# Patient Record
Sex: FEMALE | Race: WHITE | NOT HISPANIC OR LATINO | Employment: FULL TIME | URBAN - METROPOLITAN AREA
[De-identification: names, ages, dates, MRNs, and addresses within clinical notes are randomized per-mention and may not be internally consistent; named-entity substitution may affect disease eponyms.]

---

## 2017-01-16 ENCOUNTER — LAB VISIT (OUTPATIENT)
Dept: LAB | Facility: HOSPITAL | Age: 37
End: 2017-01-16
Attending: INTERNAL MEDICINE
Payer: COMMERCIAL

## 2017-01-16 ENCOUNTER — OFFICE VISIT (OUTPATIENT)
Dept: INTERNAL MEDICINE | Facility: CLINIC | Age: 37
End: 2017-01-16
Payer: COMMERCIAL

## 2017-01-16 VITALS
HEIGHT: 65 IN | OXYGEN SATURATION: 99 % | HEART RATE: 98 BPM | RESPIRATION RATE: 18 BRPM | WEIGHT: 125.44 LBS | SYSTOLIC BLOOD PRESSURE: 122 MMHG | BODY MASS INDEX: 20.9 KG/M2 | DIASTOLIC BLOOD PRESSURE: 88 MMHG

## 2017-01-16 DIAGNOSIS — T14.8XXA BRUISING: ICD-10-CM

## 2017-01-16 DIAGNOSIS — R42 DIZZINESS: Primary | ICD-10-CM

## 2017-01-16 DIAGNOSIS — R42 DIZZINESS: ICD-10-CM

## 2017-01-16 LAB
ANION GAP SERPL CALC-SCNC: 8 MMOL/L
BASOPHILS # BLD AUTO: 0.02 K/UL
BASOPHILS NFR BLD: 0.5 %
BUN SERPL-MCNC: 12 MG/DL
CALCIUM SERPL-MCNC: 9.5 MG/DL
CHLORIDE SERPL-SCNC: 102 MMOL/L
CO2 SERPL-SCNC: 26 MMOL/L
CREAT SERPL-MCNC: 0.8 MG/DL
DIFFERENTIAL METHOD: ABNORMAL
EOSINOPHIL # BLD AUTO: 0 K/UL
EOSINOPHIL NFR BLD: 0.7 %
ERYTHROCYTE [DISTWIDTH] IN BLOOD BY AUTOMATED COUNT: 14.1 %
EST. GFR  (AFRICAN AMERICAN): >60 ML/MIN/1.73 M^2
EST. GFR  (NON AFRICAN AMERICAN): >60 ML/MIN/1.73 M^2
GLUCOSE SERPL-MCNC: 116 MG/DL
HCT VFR BLD AUTO: 39.3 %
HGB BLD-MCNC: 13.2 G/DL
LYMPHOCYTES # BLD AUTO: 1 K/UL
LYMPHOCYTES NFR BLD: 23.8 %
MCH RBC QN AUTO: 33.1 PG
MCHC RBC AUTO-ENTMCNC: 33.6 %
MCV RBC AUTO: 99 FL
MONOCYTES # BLD AUTO: 0.9 K/UL
MONOCYTES NFR BLD: 22.4 %
NEUTROPHILS # BLD AUTO: 2.2 K/UL
NEUTROPHILS NFR BLD: 52.4 %
PLATELET # BLD AUTO: 249 K/UL
PMV BLD AUTO: 11.1 FL
POTASSIUM SERPL-SCNC: 4.4 MMOL/L
RBC # BLD AUTO: 3.99 M/UL
SODIUM SERPL-SCNC: 136 MMOL/L
WBC # BLD AUTO: 4.11 K/UL

## 2017-01-16 PROCEDURE — 99999 PR PBB SHADOW E&M-EST. PATIENT-LVL III: CPT | Mod: PBBFAC,,, | Performed by: INTERNAL MEDICINE

## 2017-01-16 PROCEDURE — 36415 COLL VENOUS BLD VENIPUNCTURE: CPT

## 2017-01-16 PROCEDURE — 80048 BASIC METABOLIC PNL TOTAL CA: CPT

## 2017-01-16 PROCEDURE — 85025 COMPLETE CBC W/AUTO DIFF WBC: CPT

## 2017-01-16 PROCEDURE — 99214 OFFICE O/P EST MOD 30 MIN: CPT | Mod: S$GLB,,, | Performed by: INTERNAL MEDICINE

## 2017-01-16 PROCEDURE — 1159F MED LIST DOCD IN RCRD: CPT | Mod: S$GLB,,, | Performed by: INTERNAL MEDICINE

## 2017-01-16 NOTE — PROGRESS NOTES
"Georgie Wetzel presents today to urgent care for:  Rash (Rt middle finger); Dizziness (2 days ago ); Bleeding/Bruising (going on for 2 wks); Fever (along with chills last night); Chills (all the time ); and Nausea (off and on for a week )      HPI Comments: She wants to know if this is related to the spironolactone which she was recently started on because of her amenorrhea.  She also has been concerned that she has gotten pregnant the past to time she's had sex and has taken "Plan B" within 3-4 days of that sexual activity even though she was already on birth control.  She is also taking pregnancy tests which have come back negative.  She has discussed her anxiety issues with her primary care physician in the past but she does not want to take any medications for it.    Rash   This is a new problem. The current episode started in the past 7 days. The problem is unchanged. Location: on right ring finger - small bumps. no pain. +Hx of warts. The rash is characterized by dryness. She was exposed to nothing. Pertinent negatives include no diarrhea. Past treatments include nothing.   Dizziness:   Chronicity:  New  Onset:  1 to 4 weeks ago  Progression since onset:  Unchanged  Frequency - weeks/days included: intermitently.  Duration:  Very brief  Dizziness characteristics:  Lightheaded/impending faintno hearing loss, no ear congestion, no tinnitus, no visual disturbances, no syncope, no palpitations and no panic.  Aggravated by:  Position changes  Treatments tried:  Nothing      Past medical, social, family and surgical history was reviewed and updated today as needed. See encounter for details.     Review of Systems   Constitutional: Positive for weight loss (a few pounds ). Negative for chills and malaise/fatigue.        She exercises for about 2 hours daily. This has been identified as a contributing cause of her amenorrhea.    HENT: Negative for hearing loss and tinnitus.    Eyes: Negative for blurred vision and " double vision.   Cardiovascular: Negative for palpitations and syncope.   Gastrointestinal: Negative for constipation and diarrhea.   Skin: Positive for rash.   Neurological: Positive for dizziness.   Endo/Heme/Allergies: Bruises/bleeds easily.       Vitals:    01/16/17 0848   BP: 122/88   Pulse: 98   Resp: 18   Body mass index is 20.87 kg/(m^2).   Physical Exam   Constitutional: She is oriented to person, place, and time.   Thin white female who appears nervous/anxious but in no acute distress   HENT:   Head: Normocephalic and atraumatic.   Eyes: EOM are normal.   Neck: No JVD present. No thyromegaly present.   Cardiovascular: Normal rate and regular rhythm.  Exam reveals no gallop and no friction rub.    No murmur heard.  Pulmonary/Chest: Effort normal and breath sounds normal. No respiratory distress. She has no wheezes.   Abdominal: Soft. Bowel sounds are normal. She exhibits no distension and no mass. There is no tenderness.   Lymphadenopathy:     She has no cervical adenopathy.   Neurological: She is alert and oriented to person, place, and time.   Skin: Rash (on right ring finger-small bumps nonspecific in nature) noted.   Psychiatric: Her mood appears anxious. Thought content is paranoid (she admits that she is paranoid she will get pregnant after having sex). She expresses impulsivity (her impulsivity includes buying Plan B repeatedly even though she is on a normalize dose of birth control).        Assessment/plan:   1. Bruising  - CBC auto differential; Future  - Basic metabolic panel; Future    2. Dizziness  - CBC auto differential; Future  - Basic metabolic panel; Future    Labs today to help patient be reassured that there are no metabolic issues going on here.  I once again have indicated that she should discuss her anxiety issues and get these under better control since they seem to be the cause of some of her problems.  Return if symptoms worsen or fail to improve.  All risks and benefits of  medications discussed with the patient today in detail. Pt. Voiced understanding and was provided with patient educational materials regarding these medications and encouraged to read this material and call the office with any questions or concerns.

## 2017-01-16 NOTE — MR AVS SNAPSHOT
[facility] - Internal Medicine  [address]  [facility] [address]  Phone: [phone]  Fax: [phone]                  Georgie Wetzel   2017 9:00 AM   Office Visit    Description:  Female : 1980   Provider:  LEONA Lee II, MD   Department:  [facility] - Internal Medicine           Reason for Visit     Rash     Dizziness     Bleeding/Bruising     Fever     Chills     Nausea           Diagnoses this Visit        Comments    Dizziness    -  Primary     Bruising                To Do List           Future Appointments        Provider Department Dept Phone    2017 9:30 AM LAB, APPOINTMENT [facility] [phone]    2017 3:15 PM Thom Evans MD [facility] [place] [address] [phone]      Goals (5 Years of Data)     None      Follow-Up and Disposition     Return if symptoms worsen or fail to improve.    Follow-up and Disposition History      [facility]     [facility] Care Line -  Assistance  Registered nurses in the [facility] provide clinical advisement, health education, appointment booking, and other advisory services.  Call for this free service at [phone].             Medications           Message regarding Medications     Verify the changes and/or additions to your medication regime listed below are the same as discussed with your clinician today.  If any of these changes or additions are incorrect, please notify your healthcare provider.             Verify that the below list of medications is an accurate representation of the medications you are currently taking.  If none reported, the list may be blank. If incorrect, please contact your healthcare provider. Carry this list with you in case of emergency.           Current Medications     ACZONE 5 % topical gel APPLY A THIN FILM TO FACE IN THE MORNING    LACTOBACILLUS ACIDOPHILUS (ACIDOPHILUS ORAL) Take by mouth.    norethindrone-ethinyl  "estradiol (MICROGESTIN 1/20) 1-20 mg-mcg per tablet Take 1 tablet by mouth once daily.    omeprazole (PRILOSEC OTC) 20 MG tablet Take 20 mg by mouth once daily.    spironolactone (ALDACTONE) 100 MG tablet Take 100 mg by mouth once daily.    ZIANA gel APPLY A THIN FILM TO FACE AT BEDTIME           Clinical Reference Information           Vital Signs - Last Recorded  Most recent update: 1/16/2017  8:53 AM by Elis Chairez MA    BP Pulse Resp Ht Wt LMP    122/88 (BP Location: Left arm, Patient Position: Sitting, BP Method: Manual) 98 18 5' 5" (1.651 m) 56.9 kg (125 lb 7.1 oz) 01/11/2017    SpO2 BMI             99% 20.87 kg/m2         Blood Pressure          Most Recent Value    BP  122/88      Allergies as of 1/16/2017     No Known Drug Allergies      Immunizations Administered on Date of Encounter - 1/16/2017     None      Orders Placed During Today's Visit     Future Labs/Procedures Expected by Expires    Basic metabolic panel  1/16/2017 4/16/2017    CBC auto differential  1/16/2017 (Approximate) 1/11/2018      "

## 2017-01-23 ENCOUNTER — OFFICE VISIT (OUTPATIENT)
Dept: INTERNAL MEDICINE | Facility: CLINIC | Age: 37
End: 2017-01-23
Payer: COMMERCIAL

## 2017-01-23 VITALS
SYSTOLIC BLOOD PRESSURE: 130 MMHG | BODY MASS INDEX: 21.38 KG/M2 | WEIGHT: 128.31 LBS | OXYGEN SATURATION: 99 % | HEIGHT: 65 IN | HEART RATE: 76 BPM | DIASTOLIC BLOOD PRESSURE: 80 MMHG | TEMPERATURE: 98 F

## 2017-01-23 DIAGNOSIS — R50.9 FEVER, UNSPECIFIED FEVER CAUSE: ICD-10-CM

## 2017-01-23 DIAGNOSIS — R42 DIZZINESS: ICD-10-CM

## 2017-01-23 DIAGNOSIS — F41.9 ANXIETY: Primary | ICD-10-CM

## 2017-01-23 PROCEDURE — 99213 OFFICE O/P EST LOW 20 MIN: CPT | Mod: S$GLB,,, | Performed by: INTERNAL MEDICINE

## 2017-01-23 PROCEDURE — 1159F MED LIST DOCD IN RCRD: CPT | Mod: S$GLB,,, | Performed by: INTERNAL MEDICINE

## 2017-01-23 PROCEDURE — 99999 PR PBB SHADOW E&M-EST. PATIENT-LVL III: CPT | Mod: PBBFAC,,, | Performed by: INTERNAL MEDICINE

## 2017-01-23 RX ORDER — ESCITALOPRAM OXALATE 10 MG/1
10 TABLET ORAL DAILY
Qty: 30 TABLET | Refills: 3 | Status: SHIPPED | OUTPATIENT
Start: 2017-01-23 | End: 2017-06-20

## 2017-01-23 NOTE — PROGRESS NOTES
Subjective:       Patient ID: Georgie Wetzel is a 36 y.o. female.    Chief Complaint: Fever; Anxiety; and Dizziness    HPI  Pt is a 36 y.o. female with PMH including HLD, GERD, depression, here for complaint of fever, dizziness, and anxiety. Patient reports having fevers last week, on Sunday, then saw PCP, had labs, came back normal, offered reassurance. Then had another fever Thursday-Friday. Associated symptoms include cough, chills. Fevers resolved Friday/Saturday, none since then. No more coughing. No diarrhea/constipation/dysuria. For dizziness, patient reports not really a room spinning, no worse with movement/position, more like a buzzing. But not a ringing in the ears. Worse with stress. Goes away on its own. Intermittent. None present now. For the anxiety/stress, patient has several person stressors lately at work, and things outside her control. Saw counselor in the past some years ago for prior life stresses, but nothing lately. Was on a medication as well but doesn't really like meds, does not want anything addictive.  Other than the fevers, dizziness, and anxiety, patient does not have other acute complaints at this time.    Review of Systems   Constitutional: Positive for fatigue, fever and unexpected weight change (7 lb weight loss). Negative for chills.   HENT: Negative for sore throat.    Eyes: Negative for visual disturbance.   Respiratory: Negative for cough and shortness of breath.    Cardiovascular: Negative for chest pain and palpitations.   Gastrointestinal: Negative for abdominal pain, constipation, diarrhea, nausea and vomiting.   Genitourinary: Positive for frequency (increased urination. chronic). Negative for dysuria and urgency.        Increased thirst   Musculoskeletal: Negative for arthralgias.   Skin: Negative for rash.   Neurological: Positive for dizziness. Negative for light-headedness and headaches.   Psychiatric/Behavioral: Positive for sleep disturbance. The patient is  "nervous/anxious.        Past Medical History   Diagnosis Date    Acne     Depression      treated with medication last use 2010, situational     GERD (gastroesophageal reflux disease)     High cholesterol     Retinal detachment      Past Surgical History   Procedure Laterality Date    Tonsillectomy, adenoidectomy       retinal tear os  2005      Rpk  ou dr samuel    7/2008    Retinal detachment surgery      Cryotherapy       Family History   Problem Relation Age of Onset    Acne Brother     Cancer Mother     Ovarian cancer Mother     Diabetes Maternal Grandmother     Glaucoma Maternal Grandfather     Cataracts Maternal Grandfather     Stroke Paternal Grandmother     Scleroderma Paternal Grandmother     Melanoma Neg Hx      Social History     Social History    Marital status: Single     Spouse name: N/A    Number of children: N/A    Years of education: N/A     Occupational History          Social History Main Topics    Smoking status: Never Smoker    Smokeless tobacco: Never Used    Alcohol use Yes      Comment: Social use of alcohol monthly     Drug use: No    Sexual activity: Not Currently     Partners: Male     Birth control/ protection: None     Other Topics Concern    Are You Pregnant Or Think You May Be? No    Breast-Feeding No     Social History Narrative       Objective:          Visit Vitals    /80 (BP Location: Right arm, Patient Position: Sitting, BP Method: Manual)    Pulse 76    Temp 97.9 °F (36.6 °C) (Oral)    Ht 5' 5" (1.651 m)    Wt 58.2 kg (128 lb 4.9 oz)    LMP 01/11/2017    SpO2 99%    BMI 21.35 kg/m2     Physical Exam   Constitutional: She is oriented to person, place, and time. She appears well-developed and well-nourished. No distress.   HENT:   Head: Normocephalic and atraumatic.   Eyes: EOM are normal. Pupils are equal, round, and reactive to light.   Neck: Normal range of motion. Neck supple. No thyromegaly present.   Cardiovascular: Normal " rate and regular rhythm.  Exam reveals no gallop and no friction rub.    No murmur heard.  Pulmonary/Chest: Effort normal and breath sounds normal. No respiratory distress. She has no wheezes. She has no rales.   Abdominal: Soft. Bowel sounds are normal. She exhibits no distension and no mass. There is no tenderness. There is no rebound and no guarding.   Musculoskeletal: Normal range of motion. She exhibits no edema or tenderness.   Lymphadenopathy:     She has no cervical adenopathy.   Neurological: She is alert and oriented to person, place, and time. No cranial nerve deficit.   Skin: Skin is warm and dry.   Psychiatric: She has a normal mood and affect. Her behavior is normal.   Anxious   Vitals reviewed.      Assessment:       1. Anxiety    2. Fever, unspecified fever cause    3. Dizziness        Plan:       1. Anxiety: Chronic, but worse lately 2/2 situation with work. Patient was seeing a counselor back in [phone] or so. Recommended patient contact that person again to get back into that, which could help with her symptoms and also in the long term. Will also prescribe lexapro.  2. Fever: Resolved. Likely 2/2 viral URI, or other viral illness. No longer acute issue. Offered re-assurance.  3. Dizziness: Cranial nerves intact, strength/sensation normal. Not worse with movement or anything. Likely 2/2 anxiety.  Patient can follow-up with PCP, or here, as needed.       Patient seen and discussed with Dr. Nino Ferrell MD  Internal Medicine PGY-3  Pager [phone]

## 2017-01-27 NOTE — PROGRESS NOTES
I have personally taken the history and examined this patient and agree with the resident's note as stated above with the following thoughts:    Will start lexapro.  Discussed stress

## 2017-03-13 RX ORDER — NORETHINDRONE ACETATE AND ETHINYL ESTRADIOL .02; 1 MG/1; MG/1
TABLET ORAL
Qty: 21 TABLET | Refills: 2 | Status: SHIPPED | OUTPATIENT
Start: 2017-03-13 | End: 2017-06-13 | Stop reason: SDUPTHER

## 2017-03-31 ENCOUNTER — OFFICE VISIT (OUTPATIENT)
Dept: OBSTETRICS AND GYNECOLOGY | Facility: CLINIC | Age: 37
End: 2017-03-31
Attending: OBSTETRICS & GYNECOLOGY
Payer: COMMERCIAL

## 2017-03-31 VITALS
SYSTOLIC BLOOD PRESSURE: 128 MMHG | WEIGHT: 132.5 LBS | HEIGHT: 65 IN | DIASTOLIC BLOOD PRESSURE: 72 MMHG | BODY MASS INDEX: 22.08 KG/M2

## 2017-03-31 DIAGNOSIS — Z30.41 ENCOUNTER FOR SURVEILLANCE OF CONTRACEPTIVE PILLS: Primary | ICD-10-CM

## 2017-03-31 DIAGNOSIS — N76.0 ACUTE VAGINITIS: ICD-10-CM

## 2017-03-31 LAB
C TRACH DNA SPEC QL NAA+PROBE: NOT DETECTED
CANDIDA RRNA VAG QL PROBE: POSITIVE
G VAGINALIS RRNA GENITAL QL PROBE: NEGATIVE
N GONORRHOEA DNA SPEC QL NAA+PROBE: NOT DETECTED
T VAGINALIS RRNA GENITAL QL PROBE: NEGATIVE

## 2017-03-31 PROCEDURE — 1160F RVW MEDS BY RX/DR IN RCRD: CPT | Mod: S$GLB,,, | Performed by: OBSTETRICS & GYNECOLOGY

## 2017-03-31 PROCEDURE — 87591 N.GONORRHOEAE DNA AMP PROB: CPT

## 2017-03-31 PROCEDURE — 99999 PR PBB SHADOW E&M-EST. PATIENT-LVL II: CPT | Mod: PBBFAC,,, | Performed by: OBSTETRICS & GYNECOLOGY

## 2017-03-31 PROCEDURE — 87480 CANDIDA DNA DIR PROBE: CPT

## 2017-03-31 PROCEDURE — 99213 OFFICE O/P EST LOW 20 MIN: CPT | Mod: S$GLB,,, | Performed by: OBSTETRICS & GYNECOLOGY

## 2017-03-31 RX ORDER — SPIRONOLACTONE 50 MG/1
100 TABLET, FILM COATED ORAL DAILY
Refills: 3 | COMMUNITY
Start: 2017-02-23 | End: 2018-02-28 | Stop reason: SDUPTHER

## 2017-03-31 NOTE — PROGRESS NOTES
Chief Complaint   Patient presents with    Contraception       HPI:  Georgie Wetzel is a 36 y.o. female patient  who presents today to discuss her usage of OCPs.  She reports returning to Microgestin OCPs 2016.  On this pill, she is having monthly menses lasting several days in duration with light flow.  No intermenstrual bleeding.  Previously, she had experienced an ocular migraine while taking high doses of over-the-counter estrogen containing products.  She has not experienced any similar symptoms while taking this birth control pill.  She feels that she is doing better on this pill than with other higher dose pills in the past.  However, she does experience some menstrual mood swings as well as episodes of increased vaginal discharge.  The discharge does not have an odor or itch.  Patient's last menstrual period was 2017 (approximate).    Past Medical History:   Diagnosis Date    Acne     Depression     treated with medication last use , situational     GERD (gastroesophageal reflux disease)     High cholesterol     Retinal detachment        Past Surgical History:   Procedure Laterality Date     Retinal Tear OS        CRYOTHERAPY      RETINAL DETACHMENT SURGERY      RPK  OU Dr Munoz    2008    TONSILLECTOMY, ADENOIDECTOMY           ROS:  GENERAL: Feeling well overall.   SKIN: Denies rash or lesions.   HEAD: Denies head injury or headache.   NODES: Denies enlarged lymph nodes.   CHEST: Denies chest pain or shortness of breath.   CARDIOVASCULAR: Denies palpitations or left sided chest pain.   ABDOMEN: No abdominal pain, nausea, vomiting or rectal bleeding.   URINARY: No dysuria or hematuria.  REPRODUCTIVE: See HPI.   BREASTS: Denies pain, lumps, or nipple discharge.   HEMATOLOGIC: No easy bruisability or excessive bleeding.   MUSCULOSKELETAL: Denies joint pain or swelling.   NEUROLOGIC: Denies syncope or weakness.   PSYCHIATRIC: Reports some menstrual mood swings.    PE:    (chaperone present during entire exam)  APPEARANCE: Well nourished, well developed, in no acute distress.  ABDOMEN: Soft. No tenderness or masses.   VULVA: No lesions. Normal female genitalia.  URETHRAL MEATUS: Normal size and location, no lesions, no prolapse.  VAGINA: Moist and well rugated, mild amount of thin discharge.  CERVIX: No lesions and discharge.       Diagnosis:  1. Encounter for surveillance of contraceptive pills    2. Acute vaginitis          PLAN:    Orders Placed This Encounter    Vaginosis Screen by DNA Probe    C. trachomatis/N. gonorrhoeae by AMP DNA Cervix       Patient was counseled today on her usage of OCPs.  She feels that, in general, she is doing well with Microgestin and desires to continue.  We discussed her increased discharge: physiologic vs vaginitis.  We will contact her in several days for results of the Affirm, GC/CT.        Follow-up for annual exam    Total time of visit: 20 minutes (counseling >75% of time)

## 2017-04-02 ENCOUNTER — PATIENT MESSAGE (OUTPATIENT)
Dept: OBSTETRICS AND GYNECOLOGY | Facility: CLINIC | Age: 37
End: 2017-04-02

## 2017-04-02 RX ORDER — FLUCONAZOLE 150 MG/1
150 TABLET ORAL ONCE
Qty: 1 TABLET | Refills: 0 | Status: SHIPPED | OUTPATIENT
Start: 2017-04-02 | End: 2017-04-02

## 2017-04-05 ENCOUNTER — TELEPHONE (OUTPATIENT)
Dept: OBSTETRICS AND GYNECOLOGY | Facility: CLINIC | Age: 37
End: 2017-04-05

## 2017-04-05 NOTE — TELEPHONE ENCOUNTER
From   Thom Evans MD    To   Georgie Wetzel    Sent   4/2/2017  9:30 AM         Your recent vaginal culture returned showing yeast.   I will send a prescription for Diflucan to your pharmacy on record.   This is a one time treatment for yeast.   Let us know if your symptoms do not resolve with this treatment.     -Dr. Evans

## 2017-06-13 ENCOUNTER — OFFICE VISIT (OUTPATIENT)
Dept: INTERNAL MEDICINE | Facility: CLINIC | Age: 37
End: 2017-06-13
Payer: COMMERCIAL

## 2017-06-13 ENCOUNTER — HOSPITAL ENCOUNTER (OUTPATIENT)
Dept: RADIOLOGY | Facility: HOSPITAL | Age: 37
Discharge: HOME | End: 2017-06-13
Attending: NURSE PRACTITIONER
Payer: COMMERCIAL

## 2017-06-13 VITALS
WEIGHT: 132.31 LBS | DIASTOLIC BLOOD PRESSURE: 68 MMHG | SYSTOLIC BLOOD PRESSURE: 120 MMHG | TEMPERATURE: 98 F | HEIGHT: 65 IN | HEART RATE: 78 BPM | BODY MASS INDEX: 22.04 KG/M2

## 2017-06-13 DIAGNOSIS — R10.13 EPIGASTRIC PAIN: ICD-10-CM

## 2017-06-13 DIAGNOSIS — K59.04 FUNCTIONAL CONSTIPATION: Primary | ICD-10-CM

## 2017-06-13 LAB
B-HCG UR QL: NEGATIVE
CTP QC/QA: YES

## 2017-06-13 PROCEDURE — 81025 URINE PREGNANCY TEST: CPT | Mod: QW,S$GLB,, | Performed by: NURSE PRACTITIONER

## 2017-06-13 PROCEDURE — 74020 XR ABDOMEN FLAT AND ERECT: CPT | Mod: TC

## 2017-06-13 PROCEDURE — 99999 PR PBB SHADOW E&M-EST. PATIENT-LVL IV: CPT | Mod: PBBFAC,,, | Performed by: NURSE PRACTITIONER

## 2017-06-13 PROCEDURE — 74020 XR ABDOMEN FLAT AND ERECT: CPT | Mod: 26,,, | Performed by: RADIOLOGY

## 2017-06-13 PROCEDURE — 99213 OFFICE O/P EST LOW 20 MIN: CPT | Mod: S$GLB,,, | Performed by: NURSE PRACTITIONER

## 2017-06-13 RX ORDER — NORETHINDRONE ACETATE AND ETHINYL ESTRADIOL .02; 1 MG/1; MG/1
TABLET ORAL
Qty: 21 TABLET | Refills: 6 | Status: SHIPPED | OUTPATIENT
Start: 2017-06-13 | End: 2017-11-21 | Stop reason: SDUPTHER

## 2017-06-14 ENCOUNTER — NURSE TRIAGE (OUTPATIENT)
Dept: ADMINISTRATIVE | Facility: CLINIC | Age: 37
End: 2017-06-14

## 2017-06-14 ENCOUNTER — HOSPITAL ENCOUNTER (EMERGENCY)
Facility: OTHER | Age: 37
Discharge: HOME | End: 2017-06-14
Attending: EMERGENCY MEDICINE
Payer: COMMERCIAL

## 2017-06-14 ENCOUNTER — HOSPITAL ENCOUNTER (EMERGENCY)
Facility: OTHER | Age: 37
Discharge: HOME | End: 2017-06-15
Attending: EMERGENCY MEDICINE
Payer: COMMERCIAL

## 2017-06-14 VITALS
WEIGHT: 130 LBS | BODY MASS INDEX: 21.66 KG/M2 | SYSTOLIC BLOOD PRESSURE: 120 MMHG | RESPIRATION RATE: 16 BRPM | TEMPERATURE: 99 F | DIASTOLIC BLOOD PRESSURE: 70 MMHG | HEIGHT: 65 IN | HEART RATE: 74 BPM | OXYGEN SATURATION: 99 %

## 2017-06-14 DIAGNOSIS — K76.9 LESION OF LIVER: ICD-10-CM

## 2017-06-14 DIAGNOSIS — R10.31 ABDOMINAL PAIN, RLQ: Primary | ICD-10-CM

## 2017-06-14 DIAGNOSIS — R10.31 RIGHT LOWER QUADRANT ABDOMINAL PAIN: Primary | ICD-10-CM

## 2017-06-14 DIAGNOSIS — N83.201 CYST OF RIGHT OVARY: ICD-10-CM

## 2017-06-14 DIAGNOSIS — K52.9: ICD-10-CM

## 2017-06-14 LAB
ALBUMIN SERPL BCP-MCNC: 3.8 G/DL
ALP SERPL-CCNC: 46 U/L
ALT SERPL W/O P-5'-P-CCNC: 12 U/L
ANION GAP SERPL CALC-SCNC: 9 MMOL/L
APTT BLDCRRT: 27.4 SEC
AST SERPL-CCNC: 13 U/L
BACTERIA #/AREA URNS HPF: ABNORMAL /HPF
BASOPHILS # BLD AUTO: 0.01 K/UL
BASOPHILS # BLD AUTO: 0.01 K/UL
BASOPHILS NFR BLD: 0.1 %
BASOPHILS NFR BLD: 0.1 %
BILIRUB SERPL-MCNC: 0.5 MG/DL
BILIRUB UR QL STRIP: NEGATIVE
BILIRUB UR QL STRIP: NEGATIVE
BUN SERPL-MCNC: 8 MG/DL
CALCIUM SERPL-MCNC: 9 MG/DL
CHLORIDE SERPL-SCNC: 107 MMOL/L
CLARITY UR: CLEAR
CLARITY UR: CLEAR
CO2 SERPL-SCNC: 22 MMOL/L
COLOR UR: YELLOW
COLOR UR: YELLOW
CREAT SERPL-MCNC: 0.7 MG/DL
CRP SERPL-MCNC: 11.5 MG/L
CRP SERPL-MCNC: 72.6 MG/L
DIFFERENTIAL METHOD: ABNORMAL
DIFFERENTIAL METHOD: ABNORMAL
EOSINOPHIL # BLD AUTO: 0.1 K/UL
EOSINOPHIL # BLD AUTO: 0.1 K/UL
EOSINOPHIL NFR BLD: 0.6 %
EOSINOPHIL NFR BLD: 0.7 %
ERYTHROCYTE [DISTWIDTH] IN BLOOD BY AUTOMATED COUNT: 13.2 %
ERYTHROCYTE [DISTWIDTH] IN BLOOD BY AUTOMATED COUNT: 13.4 %
EST. GFR  (AFRICAN AMERICAN): >60 ML/MIN/1.73 M^2
EST. GFR  (NON AFRICAN AMERICAN): >60 ML/MIN/1.73 M^2
GLUCOSE SERPL-MCNC: 107 MG/DL
GLUCOSE UR QL STRIP: NEGATIVE
GLUCOSE UR QL STRIP: NEGATIVE
HCT VFR BLD AUTO: 34.9 %
HCT VFR BLD AUTO: 36.4 %
HGB BLD-MCNC: 11.5 G/DL
HGB BLD-MCNC: 12.2 G/DL
HGB UR QL STRIP: ABNORMAL
HGB UR QL STRIP: NEGATIVE
INR PPP: 0.9
KETONES UR QL STRIP: NEGATIVE
KETONES UR QL STRIP: NEGATIVE
LEUKOCYTE ESTERASE UR QL STRIP: ABNORMAL
LEUKOCYTE ESTERASE UR QL STRIP: NEGATIVE
LIPASE SERPL-CCNC: 11 U/L
LYMPHOCYTES # BLD AUTO: 1.6 K/UL
LYMPHOCYTES # BLD AUTO: 1.8 K/UL
LYMPHOCYTES NFR BLD: 13.2 %
LYMPHOCYTES NFR BLD: 16.8 %
MCH RBC QN AUTO: 31.9 PG
MCH RBC QN AUTO: 32 PG
MCHC RBC AUTO-ENTMCNC: 33 %
MCHC RBC AUTO-ENTMCNC: 33.5 %
MCV RBC AUTO: 96 FL
MCV RBC AUTO: 97 FL
MICROSCOPIC COMMENT: ABNORMAL
MONOCYTES # BLD AUTO: 0.7 K/UL
MONOCYTES # BLD AUTO: 1.2 K/UL
MONOCYTES NFR BLD: 6.1 %
MONOCYTES NFR BLD: 9.9 %
NEUTROPHILS # BLD AUTO: 8.1 K/UL
NEUTROPHILS # BLD AUTO: 9.3 K/UL
NEUTROPHILS NFR BLD: 76 %
NEUTROPHILS NFR BLD: 76.1 %
NITRITE UR QL STRIP: NEGATIVE
NITRITE UR QL STRIP: NEGATIVE
NON-SQ EPI CELLS #/AREA URNS HPF: 2 /HPF
PH UR STRIP: 6 [PH] (ref 5–8)
PH UR STRIP: 6 [PH] (ref 5–8)
PLATELET # BLD AUTO: 233 K/UL
PLATELET # BLD AUTO: 239 K/UL
PMV BLD AUTO: 10 FL
PMV BLD AUTO: 10 FL
POTASSIUM SERPL-SCNC: 3.9 MMOL/L
PROT SERPL-MCNC: 7 G/DL
PROT UR QL STRIP: NEGATIVE
PROT UR QL STRIP: NEGATIVE
PROTHROMBIN TIME: 9.9 SEC
RBC # BLD AUTO: 3.6 M/UL
RBC # BLD AUTO: 3.81 M/UL
RBC #/AREA URNS HPF: 2 /HPF (ref 0–4)
SODIUM SERPL-SCNC: 138 MMOL/L
SP GR UR STRIP: <=1.005 (ref 1–1.03)
SP GR UR STRIP: <=1.005 (ref 1–1.03)
SQUAMOUS #/AREA URNS HPF: 5 /HPF
URN SPEC COLLECT METH UR: ABNORMAL
URN SPEC COLLECT METH UR: ABNORMAL
UROBILINOGEN UR STRIP-ACNC: NEGATIVE EU/DL
UROBILINOGEN UR STRIP-ACNC: NEGATIVE EU/DL
WBC # BLD AUTO: 10.69 K/UL
WBC # BLD AUTO: 12.27 K/UL
WBC #/AREA URNS HPF: 12 /HPF (ref 0–5)
WBC CLUMPS URNS QL MICRO: ABNORMAL
YEAST URNS QL MICRO: ABNORMAL

## 2017-06-14 PROCEDURE — 96374 THER/PROPH/DIAG INJ IV PUSH: CPT

## 2017-06-14 PROCEDURE — 85730 THROMBOPLASTIN TIME PARTIAL: CPT

## 2017-06-14 PROCEDURE — 80053 COMPREHEN METABOLIC PANEL: CPT

## 2017-06-14 PROCEDURE — 86140 C-REACTIVE PROTEIN: CPT | Mod: 91

## 2017-06-14 PROCEDURE — 25000003 PHARM REV CODE 250: Performed by: EMERGENCY MEDICINE

## 2017-06-14 PROCEDURE — 63600175 PHARM REV CODE 636 W HCPCS: Performed by: EMERGENCY MEDICINE

## 2017-06-14 PROCEDURE — 99283 EMERGENCY DEPT VISIT LOW MDM: CPT | Mod: 25,27

## 2017-06-14 PROCEDURE — 85025 COMPLETE CBC W/AUTO DIFF WBC: CPT | Mod: 91

## 2017-06-14 PROCEDURE — 85610 PROTHROMBIN TIME: CPT

## 2017-06-14 PROCEDURE — 87086 URINE CULTURE/COLONY COUNT: CPT

## 2017-06-14 PROCEDURE — 81000 URINALYSIS NONAUTO W/SCOPE: CPT

## 2017-06-14 PROCEDURE — 83690 ASSAY OF LIPASE: CPT

## 2017-06-14 PROCEDURE — 96376 TX/PRO/DX INJ SAME DRUG ADON: CPT

## 2017-06-14 PROCEDURE — 81003 URINALYSIS AUTO W/O SCOPE: CPT

## 2017-06-14 PROCEDURE — 85025 COMPLETE CBC W/AUTO DIFF WBC: CPT

## 2017-06-14 PROCEDURE — 96375 TX/PRO/DX INJ NEW DRUG ADDON: CPT

## 2017-06-14 PROCEDURE — 96361 HYDRATE IV INFUSION ADD-ON: CPT

## 2017-06-14 PROCEDURE — 25500020 PHARM REV CODE 255: Performed by: EMERGENCY MEDICINE

## 2017-06-14 PROCEDURE — 86140 C-REACTIVE PROTEIN: CPT

## 2017-06-14 PROCEDURE — 99284 EMERGENCY DEPT VISIT MOD MDM: CPT | Mod: 25

## 2017-06-14 RX ORDER — HYOSCYAMINE SULFATE 0.12 MG/1
0.12 TABLET SUBLINGUAL
Status: COMPLETED | OUTPATIENT
Start: 2017-06-14 | End: 2017-06-14

## 2017-06-14 RX ORDER — MORPHINE SULFATE 2 MG/ML
4 INJECTION, SOLUTION INTRAMUSCULAR; INTRAVENOUS
Status: COMPLETED | OUTPATIENT
Start: 2017-06-14 | End: 2017-06-14

## 2017-06-14 RX ORDER — HYOSCYAMINE SULFATE 0.12 MG/1
0.25 TABLET SUBLINGUAL
Status: COMPLETED | OUTPATIENT
Start: 2017-06-14 | End: 2017-06-14

## 2017-06-14 RX ORDER — ONDANSETRON 2 MG/ML
8 INJECTION INTRAMUSCULAR; INTRAVENOUS
Status: COMPLETED | OUTPATIENT
Start: 2017-06-14 | End: 2017-06-14

## 2017-06-14 RX ORDER — KETOROLAC TROMETHAMINE 30 MG/ML
30 INJECTION, SOLUTION INTRAMUSCULAR; INTRAVENOUS
Status: COMPLETED | OUTPATIENT
Start: 2017-06-14 | End: 2017-06-14

## 2017-06-14 RX ORDER — KETOROLAC TROMETHAMINE 30 MG/ML
15 INJECTION, SOLUTION INTRAMUSCULAR; INTRAVENOUS
Status: COMPLETED | OUTPATIENT
Start: 2017-06-14 | End: 2017-06-14

## 2017-06-14 RX ORDER — HYDROCODONE BITARTRATE AND ACETAMINOPHEN 5; 325 MG/1; MG/1
1 TABLET ORAL EVERY 4 HOURS PRN
Qty: 20 TABLET | Refills: 0 | Status: ON HOLD | OUTPATIENT
Start: 2017-06-14 | End: 2017-08-25

## 2017-06-14 RX ORDER — HYDROCODONE BITARTRATE AND ACETAMINOPHEN 5; 325 MG/1; MG/1
2 TABLET ORAL
Status: COMPLETED | OUTPATIENT
Start: 2017-06-14 | End: 2017-06-14

## 2017-06-14 RX ORDER — HYOSCYAMINE SULFATE 0.5 MG/ML
0.25 INJECTION, SOLUTION SUBCUTANEOUS
Status: DISCONTINUED | OUTPATIENT
Start: 2017-06-14 | End: 2017-06-14

## 2017-06-14 RX ORDER — DICYCLOMINE HYDROCHLORIDE 20 MG/1
20 TABLET ORAL 2 TIMES DAILY
Qty: 10 TABLET | Refills: 0 | Status: SHIPPED | OUTPATIENT
Start: 2017-06-14 | End: 2017-06-19

## 2017-06-14 RX ORDER — SODIUM CHLORIDE 9 MG/ML
1000 INJECTION, SOLUTION INTRAVENOUS
Status: COMPLETED | OUTPATIENT
Start: 2017-06-14 | End: 2017-06-15

## 2017-06-14 RX ORDER — ONDANSETRON 2 MG/ML
8 INJECTION INTRAMUSCULAR; INTRAVENOUS ONCE
Status: COMPLETED | OUTPATIENT
Start: 2017-06-14 | End: 2017-06-14

## 2017-06-14 RX ADMIN — ONDANSETRON 8 MG: 2 INJECTION INTRAMUSCULAR; INTRAVENOUS at 07:06

## 2017-06-14 RX ADMIN — SODIUM CHLORIDE 1000 ML: 0.9 INJECTION, SOLUTION INTRAVENOUS at 11:06

## 2017-06-14 RX ADMIN — HYOSCYAMINE SULFATE 0.12 MG: 0.12 TABLET ORAL at 11:06

## 2017-06-14 RX ADMIN — ONDANSETRON 8 MG: 2 INJECTION INTRAMUSCULAR; INTRAVENOUS at 12:06

## 2017-06-14 RX ADMIN — KETOROLAC TROMETHAMINE 30 MG: 30 INJECTION, SOLUTION INTRAMUSCULAR at 11:06

## 2017-06-14 RX ADMIN — MORPHINE SULFATE 4 MG: 2 INJECTION, SOLUTION INTRAMUSCULAR; INTRAVENOUS at 08:06

## 2017-06-14 RX ADMIN — HYOSCYAMINE SULFATE 0.25 MG: 0.12 TABLET ORAL at 08:06

## 2017-06-14 RX ADMIN — SODIUM CHLORIDE 1000 ML: 0.9 INJECTION, SOLUTION INTRAVENOUS at 07:06

## 2017-06-14 RX ADMIN — KETOROLAC TROMETHAMINE 15 MG: 30 INJECTION, SOLUTION INTRAMUSCULAR at 10:06

## 2017-06-14 RX ADMIN — HYDROCODONE BITARTRATE AND ACETAMINOPHEN 2 TABLET: 5; 325 TABLET ORAL at 11:06

## 2017-06-14 RX ADMIN — IOHEXOL 75 ML: 350 INJECTION, SOLUTION INTRAVENOUS at 09:06

## 2017-06-14 NOTE — TELEPHONE ENCOUNTER
"Was seen on yesterday for constipation and was given miralx to take. Started at 845 and advised to stop when clear. It is clear now but has a gas pain on right side.Is alleviated at times but gets sensitive to touch at times.    Reason for Disposition   [1] MILD-MODERATE pain AND [2] constant AND [3] present > 2 hours    Answer Assessment - Initial Assessment Questions  1. LOCATION: "Where does it hurt?"       Upper right quad  2. RADIATION: "Does the pain shoot anywhere else?" (e.g., chest, back)      denies  3. ONSET: "When did the pain begin?" (e.g., minutes, hours or days ago)       3 hours ago  4. SUDDEN: "Gradual or sudden onset?"      gradual  5. PATTERN "Does the pain come and go, or is it constant?"     - If constant: "Is it getting better, staying the same, or worsening?"       (Note: Constant means the pain never goes away completely; most serious pain is constant and it progresses)      - If intermittent: "How long does it last?" "Do you have pain now?"      (Note: Intermittent means the pain goes away completely between bouts)      Constant but less after going to bathroom  6. SEVERITY: "How bad is the pain?"  (e.g., Scale 1-10; mild, moderate, or severe)     - MILD (1-3): doesn't interfere with normal activities, abdomen soft and not tender to touch      - MODERATE (4-7): interferes with normal activities or awakens from sleep, tender to touch      - SEVERE (8-10): excruciating pain, doubled over, unable to do any normal activities        7-8  7. RECURRENT SYMPTOM: "Have you ever had this type of abdominal pain before?" If so, ask: "When was the last time?" and "What happened that time?"       denies  8. AGGRAVATING FACTORS: "Does anything seem to cause this pain?" (e.g., foods, stress, alcohol)      BM makes it better  9. CARDIAC SYMPTOMS: "Do you have any of the following symptoms: chest pain, difficulty breathing, sweating, nausea?"      denies  10. OTHER SYMPTOMS: "Do you have any other symptoms?" " "(e.g., fever, vomiting, diarrhea)        denies  11. PREGNANCY: "Is there any chance you are pregnant?" "When was your last menstrual period?"        denies    Protocols used: ST ABDOMINAL PAIN - UPPER-A-AH      "

## 2017-06-14 NOTE — ED PROVIDER NOTES
"Encounter Date: 6/14/2017    SCRIBE #1 NOTE: I, Ena Mancia, am scribing for, and in the presence of,  Dr. Peralta. I have scribed the entire note.       History     Chief Complaint   Patient presents with    Abdominal Pain     was constipated and was told to do a mirilax cleanse and take mirilax every 20 min until tae comes out clear, started last night 8:30 till 1:15 18 doses and is now having severe abdominal pain     Review of patient's allergies indicates:   Allergen Reactions    No known drug allergies      Time seen by provider: 7:02 AM    This is a 36 y.o. female with GERD who presents with complaint of RLQ abdominal pain that worsened 4 hours PTA. She describes the pain as "cramping and severe gas." She notes associated mild intermittent nausea. She reports pain is worse with movement. She was seen yesterday by internal medicine for epigastric abdominal pain and nausea. She was diagnosed with constipation. She took 18 doses of Miralax between 20:30 last night and 1:30 this morning. She reports she has had numerous BMs that have been "running clear." Last BM was 1 hour PTA. She denies fever, hematuria, or dysuria. LMP 2 weeks ago. She denies history of ovarian cysts. She denies h/o abdominal surgeries. NKDA. No additional complaints.     Additional past medical, surgical, and social history as outlined in the nursing assessment was reviewed by me.        The history is provided by the patient.     Past Medical History:   Diagnosis Date    Acne     Depression     treated with medication last use 2010, situational     GERD (gastroesophageal reflux disease)     High cholesterol     Retinal detachment      Past Surgical History:   Procedure Laterality Date     Retinal Tear OS  2005      CRYOTHERAPY      RETINAL DETACHMENT SURGERY      RPK  [facility] Dr Munoz    7/2008    TONSILLECTOMY, ADENOIDECTOMY       Family History   Problem Relation Age of Onset    Acne Brother     Cancer Mother     Ovarian " cancer Mother     Diabetes Maternal Grandmother     Glaucoma Maternal Grandfather     Cataracts Maternal Grandfather     Stroke Paternal Grandmother     Scleroderma Paternal Grandmother     Melanoma Neg Hx      Social History   Substance Use Topics    Smoking status: Never Smoker    Smokeless tobacco: Never Used    Alcohol use Yes      Comment: Social use of alcohol monthly      Review of Systems   Constitutional: Negative for chills and fever.   HENT: Negative for congestion.    Respiratory: Negative for cough and shortness of breath.    Cardiovascular: Negative for chest pain.   Gastrointestinal: Positive for abdominal pain, constipation (resolved) and nausea. Negative for vomiting.        Numerous BM.    Genitourinary: Negative for dysuria and hematuria.   Musculoskeletal: Negative for myalgias.   Skin: Negative for color change and rash.   Neurological: Negative for dizziness and light-headedness.   Psychiatric/Behavioral: Negative for behavioral problems.       Physical Exam     Initial Vitals [06/14/17 0650]   BP Pulse Resp Temp SpO2   (!) 132/91 88 16 98.2 °F (36.8 °C) 98 %     Physical Exam    Constitutional: She appears well-developed and well-nourished. She is not diaphoretic. No distress.   HENT:   Head: Normocephalic and atraumatic.   Eyes: Conjunctivae are normal. Pupils are equal, round, and reactive to light.   Neck: Normal range of motion. Neck supple.   Cardiovascular: Normal rate, regular rhythm and normal heart sounds. Exam reveals no gallop and no friction rub.    No murmur heard.  Pulmonary/Chest: Breath sounds normal. No respiratory distress. She has no wheezes. She has no rhonchi. She has no rales.   Abdominal: Soft. She exhibits no distension. Bowel sounds are decreased. There is tenderness. There is no rebound and no guarding.   RLQ tenderness, not at McBurney's point. Negative obturator sign. Negative psoas sign.   Musculoskeletal: Normal range of motion. She exhibits no edema or  tenderness.   Neurological: She is alert and oriented to person, place, and time. She has normal strength. No cranial nerve deficit or sensory deficit.   Skin: Skin is warm and dry. No erythema. No pallor.   Psychiatric: She has a normal mood and affect.         ED Course   Procedures  Labs Reviewed   URINALYSIS - Abnormal; Notable for the following:        Result Value    Specific Gravity, UA <=1.005 (*)     Occult Blood UA Trace (*)     Leukocytes, UA 1+ (*)     All other components within normal limits   C-REACTIVE PROTEIN - Abnormal; Notable for the following:     CRP 11.5 (*)     All other components within normal limits   CBC W/ AUTO DIFFERENTIAL - Abnormal; Notable for the following:     RBC 3.81 (*)     Hematocrit 36.4 (*)     MCH 32.0 (*)     Gran # 8.1 (*)     Gran% 76.1 (*)     Lymph% 16.8 (*)     All other components within normal limits   COMPREHENSIVE METABOLIC PANEL - Abnormal; Notable for the following:     CO2 22 (*)     Alkaline Phosphatase 46 (*)     All other components within normal limits   URINALYSIS MICROSCOPIC - Abnormal; Notable for the following:     WBC, UA 12 (*)     WBC Clumps, UA Occasional (*)     Bacteria, UA Few (*)     Yeast, UA Rare (*)     Non-Squam Epith 2 (*)     All other components within normal limits   CULTURE, URINE   CULTURE, URINE   LIPASE   PROTIME-INR   APTT     Imaging Results          US Pelvis Comp with Transvag NON-OB (xpd (Final result)  Result time 06/14/17 13:10:14    Final result by Clay Gonzalez MD (06/14/17 13:10:14)                 Impression:        3.1 cm benign simple cysts of the right ovary.      Electronically signed by: CLAY GONZALEZ MD  Date:     06/14/17  Time:    13:10              Narrative:    Comparison: None.      Technique: Transabdominal and transvaginal sonographic evaluation of pelvic organs was performed including grayscale, color flow and spectral technique.    Findings:    The uterus is normal in size measuring 6.8 x 3.2 x 4.4 cm  and the endometrial stripe is uniform in thickness measuring 3 mm.      The right ovary measures 3.6 x 2.0 x 2.8 cm with normal blood flow. Repor 1 cm benign simple cysts of the right ovary.    The left ovary measures 2.4 x 0.9 x 0.8 cm with normal blood flow.    There is no free pelvic fluid.                             CT Abdomen Pelvis With Contrast (Final result)  Result time 06/14/17 10:01:13    Final result by Anahy Roman MD (06/14/17 10:01:13)                 Impression:       Inflammation about the cecum with suggestion of an associated diverticulum, may relate to colitis or diverticulitis.  Additional considerations include appendicitis; however, the appendix is not able to clearly be identified in this region.  No associated perforation or abscess.    1 cm inferior right hepatic lobe lesion may represent a hemangioma but is overall indeterminate.  Further characterization is recommended with non-emergent Liver MRI.    Mild right pelvocaliectasis and ureterectasis without obstructing lesion, may be reactive.      Electronically signed by: ANAHY ROMAN  Date:     06/14/17  Time:    10:01              Narrative:    HISTORY:  RLQ pain    TECHNIQUE: Axial CT images acquired from the diaphragm through the ischial tuberosities  after administration of oral contrast and 75 cc of Omnipaque 350  IV.    Multiplanar reconstructions provided for review.    COMPARISON: None.     FINDINGS:    Inferior Mediastinum/Heart: Small hiatal hernia.    Lung Bases: No nodules/consolidation.    Peritoneal Space: No ascites. No free air.    Liver: Nonspecific 1 cm hypodense lesion in the inferior margin of the right hepatic lobe.  Peripheral dot of nodular enhancement raises question of hemangioma.      Gallbladder: No calcified gallstones.    Bile Ducts: No evidence of dilated ducts.    Pancreas: No mass or peripancreatic fat stranding.     Spleen: Normal.    Adrenals: Unremarkable.    Bowel/Mesentery: There is bowel wall thickening  and inflammation about the cecum, without a clearly identifiable appendix.  No abscess or perforation.  In this region, there is suggestion of a 1.5 cm diverticulum.      Urinary Tract: Mild right pelvocaliectasis and ureterectasis without obstructing lesion.     Retroperitoneum:  No significant adenopathy.     Pelvis:  No pelvic masses, adenopathy, or free fluid. 2.8 cm right adnexal cystic focus, likely dominant ovarian follicle.    Abdominal wall:  Normal.     Vasculature: No aneurysm.      Bones: No acute fracture or bony destructive process.                                      Medical Decision Making:   History:   Old Medical Records: I decided to obtain old medical records.  Old Records Summarized: records from another hospital and records from clinic visits.       <> Summary of Records: Abdominal x-ray from yesterday shows a large amount of stool in RLQ. Opacity of gas with no air fluid levels.   Initial Assessment:   Patient presents with worsening abdominal pain since diagnosis of constipation yesterday. On exam her abdomen is soft but her pain has migrated into the RLQ. I have concern for acute appendicitis. Ovarian torsion is also in differential though less likely based on history provided. I will send labs to look for inflammation. I will perform CT abdomen. I will provide analgesia. I will reassess.    10:23 AM - Patient is currently resting comfortably.  CT of abdomen was performed; the appendix was unable to be visualized.  She does have bowel wall thickening and inflammation around the cecum.  Her history and demographics make diverticulitis less likely.  He colitis could be related to excessive diarrhea overnight.  Her CT also reveals a right adnexal cyst.  Patient endorses a family history of ovarian cancer; shows to her the importance of following up with GYN later to have a repeat ultrasound.  She reports her pain is markedly improved and is now a 4/10.  Her blood work reveals a normal WBC  count and CRP.  Given the findings outlined above his patient for acute appendectomy is currently low.  I will give her Toradol.  If her pain remains improved she can follow-up with GYN in 48-72 hours.    11:39 AM - Patient still endorses point tenderness in the right adnexal region, 5/10 in severity at this time. She tolerated PO with ease. Because of the degree of her discomfort I will obtain an US to r/o ovarian torsion. I will give Norco at this time. I will continue to monitor.     1:31 PM - Patient's pain is nearly resolved. US shows no torsion. I will discharge with Bentyl bid and Norco prn. I have discussed with patient the diagnostic results, diagnosis, treatment plan, and need for follow-up. Patient has expressed understanding of my instructions. I am comfortable with her discharge home at this time.    Clinical Tests:   Lab Tests: Ordered and Reviewed  Radiological Study: Ordered and Reviewed            Scribe Attestation:   Scribe #1: I performed the above scribed service and the documentation accurately describes the services I performed. I attest to the accuracy of the note.    Attending Attestation:           Physician Attestation for Scribe:  Physician Attestation Statement for Scribe #1: I, Dr. Peralta, reviewed documentation, as scribed by Ena Mancia in my presence, and it is both accurate and complete.                 ED Course     Clinical Impression:     1. Right lower quadrant abdominal pain    2. Cyst of right ovary    3. Lesion of liver    4. Inflammation of large intestine                Giovanna Peralta MD  06/14/17 2570

## 2017-06-14 NOTE — ED TRIAGE NOTES
"Pent to MD yesterday for constipation and abdominal pain - told to use Miralax and do a "cleanse" - pt took several doses of miralax over several hours - now having clear liquid stool and a lot of abdominal pain.  "

## 2017-06-14 NOTE — ED NOTES
Pt states she is worried about taking pain medication on empty stomach. MD notified that patient would like something for nausea.

## 2017-06-14 NOTE — MEDICAL/APP STUDENT
"Subjective:       Patient ID: Georgie Wetzel is a 36 y.o. female.    Chief Complaint: Abdominal Pain (mid /LUQ pain started a 4am)    Abdominal Pain   This is a new problem. The current episode started today (at 4 AM). The onset quality is sudden. The problem occurs constantly. The problem has been waxing and waning. The pain is located in the epigastric region. The pain is at a severity of 2/10. The pain is mild. The quality of the pain is aching and cramping. The abdominal pain does not radiate. Associated symptoms include belching, constipation, diarrhea and flatus. Pertinent negatives include no arthralgias, dysuria, fever, frequency, headaches, hematuria, myalgias, nausea or vomiting. Associated symptoms comments: Last BM today x 3; mixed "pebble like" and loose stools.  Has not had full relief from BM.  Feels like she still has to go after BM is completed.  . Nothing aggravates the pain. The pain is relieved by bowel movements and passing flatus. Treatments tried: pepto bismol. The treatment provided mild relief. Her past medical history is significant for GERD. Patient's medical history does not include UTI.     Review of Systems   Constitutional: Negative for activity change, appetite change, chills, diaphoresis and fever.   HENT: Negative for congestion, ear discharge, ear pain, nosebleeds, postnasal drip, rhinorrhea, sinus pressure, sneezing, sore throat and trouble swallowing.    Eyes: Negative for photophobia, discharge, redness, itching and visual disturbance.   Respiratory: Negative for chest tightness and shortness of breath.    Cardiovascular: Negative for chest pain and leg swelling.   Gastrointestinal: Positive for abdominal pain, constipation, diarrhea and flatus. Negative for abdominal distention, blood in stool, nausea and vomiting.   Genitourinary: Negative for dysuria, frequency, hematuria, urgency and vaginal discharge.   Musculoskeletal: Negative for arthralgias, back pain and myalgias. "   Skin: Negative for rash and wound.   Neurological: Negative for dizziness, syncope and headaches.   Hematological: Negative for adenopathy.   Psychiatric/Behavioral: Negative for suicidal ideas.   All other systems reviewed and are negative.      Objective:      Physical Exam   Constitutional: She is oriented to person, place, and time. Vital signs are normal. She appears well-developed and well-nourished. She is cooperative.  Non-toxic appearance. She does not have a sickly appearance. She does not appear ill. No distress.   HENT:   Head: Normocephalic and atraumatic.   Right Ear: Hearing and external ear normal.   Left Ear: Hearing and external ear normal.   Nose: Nose normal.   Eyes: Conjunctivae and EOM are normal. Pupils are equal, round, and reactive to light. Right eye exhibits no discharge. Left eye exhibits no discharge.   Neck: Normal range of motion. Neck supple.   Cardiovascular: Normal rate, regular rhythm and intact distal pulses.    Pulmonary/Chest: Effort normal. No respiratory distress. She exhibits no tenderness.   Abdominal: Soft. Normal appearance. There is no tenderness. There is no guarding.   Musculoskeletal: Normal range of motion. She exhibits no edema or tenderness.   Neurological: She is alert and oriented to person, place, and time. She has normal reflexes.   Skin: Skin is warm and dry.   Psychiatric: She has a normal mood and affect. Her behavior is normal. Judgment and thought content normal.       Recent Results (from the past 24 hour(s))   POCT Urine Pregnancy    Collection Time: 06/13/17  7:04 PM   Result Value Ref Range    POC Preg Test, Ur Negative Negative     Acceptable Yes        Assessment:       1. Epigastric pain        Plan:       Georgie was seen today for abdominal pain.    Diagnoses and all orders for this visit:    Functional constipation  -     X-Ray Abdomen Flat And Erect; Future  -     POCT Urine Pregnancy        Pt has been given instructions  "populated from [employer] database and has verbalized understanding of the after visit summary and information contained wherein.    Follow up with a primary care provider. May go to ER for acute shortness of breath, lightheadedness, fever, or any other emergent complaints or changes in condition.    "This note will be shared with the patient"    The [employer] medical Dictation software was used during the dictation of portions or the entirety of this medical record.  Phonetic or grammatic errors may exist due to the use of this software. For clarification, refer to the author of the document.             "

## 2017-06-14 NOTE — PATIENT INSTRUCTIONS
1 dose every 20 min until bowel movement      Abdominal Pain    Abdominal pain is pain in the stomach or belly area. Everyone has this pain from time to time. In many cases it goes away on its own. But abdominal pain can sometimes be due to a serious problem, such as appendicitis. So its important to know when to seek help.  Causes of abdominal pain  There are many possible causes of abdominal pain. Common causes in adults include:  · Constipation, diarrhea, or gas  · Stomach acid flowing back up into the esophagus (acid reflux or heartburn)  · Severe acid reflux, called GERD (gastroesophageal reflux disease)  · A sore in the lining of the stomach or small intestine (peptic ulcer)  · Inflammation of the gallbladder, liver, or pancreas  · Gallstones or kidney stones  · Appendicitis   · Intestinal blockage   · An internal organ pushing through a muscle or other tissue (hernia)  · Urinary tract infections  · In women, menstrual cramps, fibroids, or endometriosis  · Inflammation or infection of the intestines  Diagnosing the cause of abdominal pain  Your healthcare provider will do a physical exam help find the cause of your pain. If needed, tests will be ordered. Belly pain has many possible causes. So it can be hard to find the reason for your pain. Giving details about your pain can help. Tell your provider where and when you feel the pain, and what makes it better or worse. Also let your provider know if you have other symptoms such as:  · Fever  · Tiredness  · Upset stomach (nausea)  · Vomiting  · Changes in bathroom habits  Treating abdominal pain  Some causes of pain need emergency medical treatment right away. These include appendicitis or a bowel blockage. Other problems can be treated with rest, fluids, or medicines. Your healthcare provider can give you specific instructions for treatment or self-care based on what is causing your pain.  If you have vomiting or diarrhea, sip water or other clear fluids. When  you are ready to eat solid foods again, start with small amounts of easy-to-digest, low-fat foods. These include apple sauce, toast, or crackers.   When to seek medical care  Call 911 or go to the hospital right away if you:  · Cant pass stool and are vomiting  · Are vomiting blood or have bloody diarrhea or black, tarry diarrhea  · Have chest, neck, or shoulder pain  · Feel like you might pass out  · Have pain in your shoulder blades with nausea  · Have sudden, severe belly pain  · Have new, severe pain unlike any you have felt before  · Have a belly that is rigid, hard, and tender to touch  Call your healthcare provider if you have:  · Pain for more than 5 days  · Bloating for more than 2 days  · Diarrhea for more than 5 days  · A fever of 100.4°F (38.0°C) or higher, or as directed by your provider  · Pain that gets worse  · Weight loss for no reason  · Continued lack of appetite  · Blood in your stool  How to prevent abdominal pain  Here are some tips to help prevent abdominal pain:  · Eat smaller amounts of food at one time.  · Avoid greasy, fried, or other high-fat foods.  · Avoid foods that give you gas.  · Exercise regularly.  · Drink plenty of fluids.  To help prevent GERD symptoms:  · Quit smoking.  · Reduce alcohol and certain foods that increase stomach acid.  · Avoid aspirin and over-the-counter pain and fever medicines (NSAIDS or nonsteroidal anti-inflammatory drugs), if possible  · Lose extra weight.  · Finish eating at least 2 hours before you go to bed or lie down.  · Raise the head of your bed.  Date Last Reviewed: 7/1/2016  © 1039-4144 [employer]. [address] [place] [address], [place] [address]. All rights reserved. This information is not intended as a substitute for professional medical care. Always follow your healthcare professional's instructions.

## 2017-06-14 NOTE — ED NOTES
Rounding on the patient has been done. Pt is AAOx 4, no acute distress noted, respirations even, unlabored, vital signs stable with monitoring in process, skin warm and dry. The patient has been updated on the plan of care and current status. Pain was assessed and at pain level 1/10 . Comfort positioning and restroom needs were addressed. She denies any needs. She complains of minor pain with palpation to abdomin. Necessary items were placed within reach and was advised when a reassessment would take place. The call bell remains at the bedside for any additional patient needs. The patient is resting comfortably on the stretcher, bed locked, low position, side rails up x 2.  Will continue to monitor.

## 2017-06-14 NOTE — PROGRESS NOTES
"Subjective:       Patient ID: Georgie Wetzel is a 36 y.o. female.     Chief Complaint: Abdominal Pain (mid /LUQ pain started a 4am)     Abdominal Pain   This is a new problem. The current episode started today (at 4 AM). The onset quality is sudden. The problem occurs constantly. The problem has been waxing and waning. The pain is located in the epigastric region. The pain is at a severity of 2/10. The pain is mild. The quality of the pain is aching and cramping. The abdominal pain does not radiate. Associated symptoms include belching, constipation, diarrhea and flatus. Pertinent negatives include no arthralgias, dysuria, fever, frequency, headaches, hematuria, myalgias, nausea or vomiting. Associated symptoms comments: Last BM today x 3; mixed "pebble like" and loose stools.  Has not had full relief from BM.  Feels like she still has to go after BM is completed.  . Nothing aggravates the pain. The pain is relieved by bowel movements and passing flatus. Treatments tried: pepto bismol. The treatment provided mild relief. Her past medical history is significant for GERD. Patient's medical history does not include UTI.      Review of Systems   Constitutional: Negative for activity change, appetite change, chills, diaphoresis and fever.   HENT: Negative for congestion, ear discharge, ear pain, nosebleeds, postnasal drip, rhinorrhea, sinus pressure, sneezing, sore throat and trouble swallowing.    Eyes: Negative for photophobia, discharge, redness, itching and visual disturbance.   Respiratory: Negative for chest tightness and shortness of breath.    Cardiovascular: Negative for chest pain and leg swelling.   Gastrointestinal: Positive for abdominal pain, constipation, diarrhea and flatus. Negative for abdominal distention, blood in stool, nausea and vomiting.   Genitourinary: Negative for dysuria, frequency, hematuria, urgency and vaginal discharge.   Musculoskeletal: Negative for arthralgias, back pain and " myalgias.   Skin: Negative for rash and wound.   Neurological: Negative for dizziness, syncope and headaches.   Hematological: Negative for adenopathy.   Psychiatric/Behavioral: Negative for suicidal ideas.   All other systems reviewed and are negative.      Objective:      Physical Exam   Constitutional: She is oriented to person, place, and time. Vital signs are normal. She appears well-developed and well-nourished. She is cooperative.  Non-toxic appearance. She does not have a sickly appearance. She does not appear ill. No distress.   HENT:   Head: Normocephalic and atraumatic.   Right Ear: Hearing and external ear normal.   Left Ear: Hearing and external ear normal.   Nose: Nose normal.   Eyes: Conjunctivae and EOM are normal. Pupils are equal, round, and reactive to light. Right eye exhibits no discharge. Left eye exhibits no discharge.   Neck: Normal range of motion. Neck supple.   Cardiovascular: Normal rate, regular rhythm and intact distal pulses.    Pulmonary/Chest: Effort normal. No respiratory distress. She exhibits no tenderness.   Abdominal: Soft. Normal appearance. There is no tenderness. There is no guarding.   Musculoskeletal: Normal range of motion. She exhibits no edema or tenderness.   Neurological: She is alert and oriented to person, place, and time. She has normal reflexes.   Skin: Skin is warm and dry.   Psychiatric: She has a normal mood and affect. Her behavior is normal. Judgment and thought content normal.            Recent Results (from the past 24 hour(s))   POCT Urine Pregnancy     Collection Time: 06/13/17  7:04 PM   Result Value Ref Range     POC Preg Test, Ur Negative Negative      Acceptable Yes         Radiology Procedure Done: Abdomen X-Ray - Supine & Erect.  Interpretation: Copious stool in colon        Assessment:      1. Epigastric pain        Plan:      Georgie was seen today for abdominal pain.     Diagnoses and all orders for this visit:     Functional  "constipation  -     X-Ray Abdomen Flat And Erect; Future  -     POCT Urine Pregnancy     Advised miralax 17gm in fluid every 20min until bms are clear, report pain or wellness to this clinic tomorrow.  Offered abd workup tonight, pt declined.      Pt has been given instructions populated from [employer] database and has verbalized understanding of the after visit summary and information contained wherein.     Follow up with a primary care provider. May go to ER for acute shortness of breath, lightheadedness, fever, or any other emergent complaints or changes in condition.     "This note will be shared with the patient"     The [employer] medical Dictation software was used during the dictation of portions or the entirety of this medical record.  Phonetic or grammatic errors may exist due to the use of this software. For clarification, refer to the author of the document.    "

## 2017-06-15 VITALS
BODY MASS INDEX: 22.06 KG/M2 | RESPIRATION RATE: 16 BRPM | WEIGHT: 132.38 LBS | TEMPERATURE: 99 F | HEART RATE: 74 BPM | HEIGHT: 65 IN | OXYGEN SATURATION: 99 % | SYSTOLIC BLOOD PRESSURE: 128 MMHG | DIASTOLIC BLOOD PRESSURE: 67 MMHG

## 2017-06-15 LAB — BACTERIA UR CULT: NORMAL

## 2017-06-15 PROCEDURE — 25000003 PHARM REV CODE 250: Performed by: EMERGENCY MEDICINE

## 2017-06-15 RX ORDER — CIPROFLOXACIN 500 MG/1
500 TABLET ORAL 2 TIMES DAILY
Qty: 20 TABLET | Refills: 0 | Status: SHIPPED | OUTPATIENT
Start: 2017-06-15 | End: 2017-06-22

## 2017-06-15 RX ORDER — CIPROFLOXACIN 500 MG/1
500 TABLET ORAL
Status: COMPLETED | OUTPATIENT
Start: 2017-06-15 | End: 2017-06-15

## 2017-06-15 RX ORDER — CIPROFLOXACIN 2 MG/ML
400 INJECTION, SOLUTION INTRAVENOUS
Status: DISCONTINUED | OUTPATIENT
Start: 2017-06-15 | End: 2017-06-15

## 2017-06-15 RX ADMIN — CIPROFLOXACIN 500 MG: 500 TABLET, FILM COATED ORAL at 01:06

## 2017-06-15 NOTE — ED PROVIDER NOTES
"Encounter Date: 6/14/2017    SCRIBE #1 NOTE: I, Yenni Reina , am scribing for, and in the presence of, Dr. Eid.       History     Chief Complaint   Patient presents with    Abdominal Pain     reports seen here earlier, symptoms worse now, c/o urinary retention with "not being able to pass gas", LBM today reports diarrhea, also c/o right lower quad abd pain      Review of patient's allergies indicates:   Allergen Reactions    No known drug allergies      Time seen by provider: 10:21 PM    This is a 36 y.o. female who presents with complaint of abdominal pain. Symptoms initially began this morning. RLQ pain reoccurred six hours ago, and is described as progressively worsening. She reports abdominal distention, difficulty urinating (began this morning), subjective fever, chills, and "burning" eye pain, but denies decreased appetite, nausea, vomiting, constipation, blood in stool, myalgias, dysuria, hematuria, or generalized weakness. RLQ pain worsens with turning in bed. Pt took eighteen doses of Huong lax yesterday, and experienced no relief after taking one dose of hydrocodone and bentyl five hours ago. Last BM with loose stool occurred one hour ago after eating mayi with added fiber.Symptoms are consistent with pain she experienced when seen in the ED this morning. Pt was diagnosed with right ovarian cyst.         The history is provided by the patient.     Past Medical History:   Diagnosis Date    Acne     Depression     treated with medication last use 2010, situational     GERD (gastroesophageal reflux disease)     High cholesterol     Retinal detachment      Past Surgical History:   Procedure Laterality Date     Retinal Tear OS  2005      CRYOTHERAPY      RETINAL DETACHMENT SURGERY      RPK  OU Dr Munoz    7/2008    TONSILLECTOMY, ADENOIDECTOMY       Family History   Problem Relation Age of Onset    Acne Brother     Cancer Mother     Ovarian cancer Mother     Diabetes Maternal Grandmother     " Glaucoma Maternal Grandfather     Cataracts Maternal Grandfather     Stroke Paternal Grandmother     Scleroderma Paternal Grandmother     Melanoma Neg Hx      Social History   Substance Use Topics    Smoking status: Never Smoker    Smokeless tobacco: Never Used    Alcohol use Yes      Comment: Social use of alcohol monthly      Review of Systems   Constitutional: Positive for chills and fever. Negative for appetite change.   HENT: Negative for sore throat.    Eyes: Positive for pain (bilateral).   Respiratory: Negative for shortness of breath.    Cardiovascular: Negative for chest pain.   Gastrointestinal: Positive for abdominal distention and abdominal pain. Negative for blood in stool, constipation, nausea and vomiting.   Genitourinary: Positive for difficulty urinating. Negative for dysuria and hematuria.   Musculoskeletal: Negative for back pain and myalgias.   Skin: Negative for rash.   Neurological: Negative for weakness.   Hematological: Does not bruise/bleed easily.       Physical Exam     Initial Vitals [06/14/17 2141]   BP Pulse Resp Temp SpO2   (!) 142/76 100 20 98.7 °F (37.1 °C) 97 %     Physical Exam    Nursing note and vitals reviewed.  Constitutional: She appears well-developed and well-nourished. She is not diaphoretic. No distress.   HENT:   Head: Normocephalic and atraumatic.   Right Ear: External ear normal.   Left Ear: External ear normal.   Eyes: EOM are normal. Pupils are equal, round, and reactive to light. Right eye exhibits no discharge. Left eye exhibits no discharge.   Neck: Normal range of motion.   Cardiovascular: Normal rate, regular rhythm and normal heart sounds. Exam reveals no gallop and no friction rub.    No murmur heard.  Pulmonary/Chest: Breath sounds normal. No respiratory distress. She has no wheezes. She has no rhonchi. She has no rales.   Abdominal: Soft. There is tenderness. There is no rebound and no guarding.   Tenderness to palpation of the RLQ. Negative Cabral's  sign. No tenderness at McBurney's point. Negative Rovsing's sign. Negative Obturator test. Negative psoas sign.    Musculoskeletal: Normal range of motion. She exhibits no edema or tenderness.   Neurological: She is alert and oriented to person, place, and time.   Skin: Skin is warm and dry. No rash and no abscess noted. No erythema. No pallor.   Psychiatric: She has a normal mood and affect. Her behavior is normal. Judgment and thought content normal.         ED Course   Procedures  Labs Reviewed   CBC W/ AUTO DIFFERENTIAL - Abnormal; Notable for the following:        Result Value    RBC 3.60 (*)     Hemoglobin 11.5 (*)     Hematocrit 34.9 (*)     MCH 31.9 (*)     Gran # 9.3 (*)     Mono # 1.2 (*)     Gran% 76.0 (*)     Lymph% 13.2 (*)     All other components within normal limits   C-REACTIVE PROTEIN - Abnormal; Notable for the following:     CRP 72.6 (*)     All other components within normal limits   URINALYSIS - Abnormal; Notable for the following:     Specific Gravity, UA <=1.005 (*)     All other components within normal limits             Medical Decision Making:   Clinical Tests:   Lab Tests: Ordered and Reviewed    Additional MDM:   Comments: 36-year-old female presented to the emergency department again for evaluation of her right lower quadrant pain that has persisted to her visit earlier today.  She also reports some urinary hesitancy which did resolve while she was in the emergency department.  She was able to void without difficulty.  On exam her abdominal tenderness was primarily in the right lower quadrant, however, she had a negative Rovsing sign, obturator sign, and had no pain walking.  Labs were repeated and white blood cell count slightly elevated compared to previous but still within normal limits.  Her CRP was significantly elevated compared to earlier.  This may still be secondary to suspected colitis seen on CT.  I did review her medical records from her earlier visit she did have a pelvic  ultrasound consistent with a 3 cm right ovarian cyst.  She also had a CT Was concerning for colitis versus diverticulitis.  There was mention that the appendix could not be visualized but there were no associated changes concerning for an acute appendicitis.  Given that her pain was still localized and that the appendix could not be visualized on CT, there is still concern for possible  appendicitis. however, on reassessment the patient stated that her pain had resolved after only Toradol and was only triggered when she twisted to the side.  She was given the option of repeating a CT with by mouth contrast as well as to admit for serial abdominal exams/observation.  The patient, however, opted to be discharge.  She was given strict indications for seeking immediate reevaluation.  She was also discharged home a prescription for Cipro to cover the urinary tract infection seen during her earlier visit as well as for a possible colitis as well as this was likely secondary to inflammation and not infection.  The patient voiced understanding and agreement with this plan and was discharged home in stable condition.  .          Scribe Attestation:   Scribe #1: I performed the above scribed service and the documentation accurately describes the services I performed. I attest to the accuracy of the note.    Attending Attestation:           Physician Attestation for Scribe:  Physician Attestation Statement for Scribe #1: I, Dr. Eid, reviewed documentation, as scribed by Yenni Reina  in my presence, and it is both accurate and complete.                 ED Course     Clinical Impression:     1. Abdominal pain, RLQ                Lyla Eid MD  06/15/17 1922

## 2017-06-15 NOTE — ED NOTES
LOC: The patient is awake, alert and aware of environment with an appropriate affect, the patient is oriented x 3 and speaking appropriately.  APPEARANCE: Patient resting comfortably and in no acute distress, patient is clean and well groomed, patient's clothing is properly fastened.  SKIN: The skin is warm and dry, patient has normal skin turgor and moist mucus membranes, skin intact, no breakdown or brusing noted.  MUSKULOSKELETAL: Patient moving all extremities well, no obvious swelling or deformities noted.  RESPIRATORY: Airway is open and patent, respirations are spontaneous, patient has a normal effort and rate. Breath sounds are clear and equal bilaterally.  CARDIAC: Normal heart sounds. No peripheral edema.  ABDOMEN: Soft and  tender to palpation to right lower quadrant, no distention noted. Bowel sounds present.  NEURO: No neuro deficits, hand grasp equal, no drift noted, no facial droop noted. Speech is clear.  Pt states she is unable to empty her bladder.

## 2017-06-20 ENCOUNTER — OFFICE VISIT (OUTPATIENT)
Dept: INTERNAL MEDICINE | Facility: CLINIC | Age: 37
End: 2017-06-20
Payer: COMMERCIAL

## 2017-06-20 VITALS
WEIGHT: 131.63 LBS | SYSTOLIC BLOOD PRESSURE: 112 MMHG | BODY MASS INDEX: 21.93 KG/M2 | HEIGHT: 65 IN | HEART RATE: 70 BPM | DIASTOLIC BLOOD PRESSURE: 82 MMHG

## 2017-06-20 DIAGNOSIS — K76.9 LIVER LESION: Primary | ICD-10-CM

## 2017-06-20 DIAGNOSIS — R10.31 ABDOMINAL PAIN, RLQ: ICD-10-CM

## 2017-06-20 PROCEDURE — 99214 OFFICE O/P EST MOD 30 MIN: CPT | Mod: S$GLB,,, | Performed by: PHYSICIAN ASSISTANT

## 2017-06-20 PROCEDURE — 99999 PR PBB SHADOW E&M-EST. PATIENT-LVL IV: CPT | Mod: PBBFAC,,, | Performed by: PHYSICIAN ASSISTANT

## 2017-06-20 NOTE — PROGRESS NOTES
"Subjective:       Patient ID: Georgie Wetzel is a 36 y.o. female.        Chief Complaint: Follow-up    Georgie Wetzel is an established patient of ELISA Segura MD here today for ED f/u visit.      Initially seen in urgent care for constipation/abdominal pain.  X-ray showed constipation.  She was started on Miralax and took several doses.  Ultimately had bowel movement.      The next day, began to have worsening and more localized RLQ pain.  She presented to the ED and had CT scan of the abdomen showing:  CT Abdomen/Pelvis:  Inflammation about the cecum with suggestion of an associated diverticulum, may relate to colitis or diverticulitis.  Additional considerations include appendicitis; however, the appendix is not able to clearly be identified in this region.  No associated perforation or abscess.    1 cm inferior right hepatic lobe lesion may represent a hemangioma but is overall indeterminate.  Further characterization is recommended with non-emergent Liver MRI.    Mild right pelvocaliectasis and ureterectasis without obstructing lesion, may be reactive.    She was ultimately discharged from ED.  Returned to ED later secondary to worsening pain.  Had pelvic US showing right ovarian cyst.  She has a FHx of ovarian cancer and has f/u with her GYN tomorrow to discuss this further.  She was started on Cipro for possible colitis and possible UTI and discharged.  Her abdominal pain has resolved at this time.  Sometimes she feels a slight "twinge" in the right pelvic area.  Feels significantly better.  Appetite is fine.  Drinking plenty of fluids.  No N/V/D/C.  Having daily soft bowel movements.  She has f/u with GI scheduled this week as well.  CRP was elevated in ED thought to be secondary to infection/colitis.      She is anxious about everything that happened.  Her job has been very stressful as well.  She gets upset about her health generally.      Needs a PCP.           Review of Systems "   Constitutional: Negative for chills, diaphoresis, fatigue and fever.   HENT: Negative for congestion and sore throat.    Eyes: Negative for visual disturbance.   Respiratory: Negative for cough, chest tightness and shortness of breath.    Cardiovascular: Negative for chest pain, palpitations and leg swelling.   Gastrointestinal: Negative for abdominal pain, blood in stool, constipation, diarrhea, nausea and vomiting.   Genitourinary: Negative for dysuria, frequency, hematuria and urgency.   Musculoskeletal: Negative for arthralgias and back pain.   Skin: Negative for rash.   Neurological: Negative for dizziness, syncope, weakness and headaches.   Psychiatric/Behavioral: Negative for dysphoric mood and sleep disturbance. The patient is nervous/anxious.        Objective:      Physical Exam   Constitutional: She appears well-developed and well-nourished. No distress.   HENT:   Head: Normocephalic and atraumatic.   Right Ear: Tympanic membrane and external ear normal.   Left Ear: Tympanic membrane and external ear normal.   Nose: Nose normal.   Mouth/Throat: Oropharynx is clear and moist.   Eyes: Conjunctivae are normal. Pupils are equal, round, and reactive to light.   Cardiovascular: Normal rate, regular rhythm and normal heart sounds.  Exam reveals no gallop.    No murmur heard.  Pulmonary/Chest: Effort normal and breath sounds normal. No respiratory distress.   Abdominal: Soft. Normal appearance. There is no tenderness. There is no rebound, no guarding and no CVA tenderness.   Musculoskeletal: She exhibits no edema.   Neurological: She is alert.   Skin: Skin is warm and dry. She is not diaphoretic.   Psychiatric: She has a normal mood and affect.   Nursing note and vitals reviewed.      Assessment:       1. Liver lesion    2. Abdominal pain, RLQ        Plan:       Georgie was seen today for follow-up.    Diagnoses and all orders for this visit:    Liver lesion-area seen on on CT scan possibly hemangioma, schedule  "MRI for further evaluation  -     MRI Abdomen W WO Contrast; Future    Abdominal pain, RLQ-repeat lab work next week to ensure trending down, keep f/u with GI this week  -     CBC auto differential; Future  -     C-reactive protein; Future    Keep f/u with GYN regarding ovarian cyst tomorrow.  Schedule appointment to establish care with Dr. Lomeli.    Pt has been given instructions populated from [employer] database and has verbalized understanding of the after visit summary and information contained wherein.    Follow up with a primary care provider. May go to ER for acute shortness of breath, lightheadedness, fever, or any other emergent complaints or changes in condition.    "This note will be shared with the patient"    Future Appointments  Date Time Provider Department Center   6/21/2017 2:15 PM Thom Evans MD [facility] [place] [id number]   6/23/2017 11:00 AM Adrian Tuttle PA-C [facility] GASTRO [facility]   6/27/2017 7:30 AM LAB, APPOINTMENT [facility] INTMED NOM LAB IM [facility]   7/5/2017 3:45 PM [facility] MRI WIDE BORE [facility] MRI [facility]   8/18/2017 2:20 PM Christiane Lomeli MD [facility] IM [facility]               "

## 2017-06-21 ENCOUNTER — OFFICE VISIT (OUTPATIENT)
Dept: OBSTETRICS AND GYNECOLOGY | Facility: CLINIC | Age: 37
End: 2017-06-21
Attending: OBSTETRICS & GYNECOLOGY
Payer: COMMERCIAL

## 2017-06-21 VITALS
SYSTOLIC BLOOD PRESSURE: 120 MMHG | BODY MASS INDEX: 21.67 KG/M2 | DIASTOLIC BLOOD PRESSURE: 60 MMHG | WEIGHT: 130.06 LBS | HEIGHT: 65 IN

## 2017-06-21 DIAGNOSIS — N83.201 RIGHT OVARIAN CYST: Primary | ICD-10-CM

## 2017-06-21 PROCEDURE — 99213 OFFICE O/P EST LOW 20 MIN: CPT | Mod: S$GLB,,, | Performed by: OBSTETRICS & GYNECOLOGY

## 2017-06-21 PROCEDURE — 99999 PR PBB SHADOW E&M-EST. PATIENT-LVL III: CPT | Mod: PBBFAC,,, | Performed by: OBSTETRICS & GYNECOLOGY

## 2017-06-21 RX ORDER — CLOBETASOL PROPIONATE 0.5 MG/G
OINTMENT TOPICAL
Refills: 2 | COMMUNITY
Start: 2017-05-27 | End: 2017-10-17

## 2017-06-21 NOTE — PROGRESS NOTES
"Chief Complaint   Patient presents with    Follow-up     ED visit        HPI:  Georgie Wetzel is a 36 y.o. female patient  who presents today for evaluation of an ovarian cyst noted on CT / pelvic sono.  Last week, she describes having constipation and upper abdominal bloating / discomfort.  She saw her PCP who recommended Miralax.  After taking several doses of Miralax, she developed severe RLQ pain for which she went to the ER 17 for evaluation.  CT scan showed a 2.8 cm right adnexal cyst and pelvic sono reveal a 3.1 right simple cyst.  Over the past several days, she describes much less discomfort.  No fever.  She is currently on Microgestin OCPs with regular menses occurring monthly.  No intermenstrual bleeding.  She also wants to check a mole on her left breast that has been "present for years" with recent itching that has now resolved.  Patient's last menstrual period was 2017 (approximate).     17 CT:  Pelvis:  No pelvic masses, adenopathy, or free fluid. 2.8 cm right adnexal cystic focus, likely dominant ovarian follicle.    17 Pelvic sono:  The uterus is normal in size measuring 6.8 x 3.2 x 4.4 cm and the endometrial stripe is uniform in thickness measuring 3 mm.    The right ovary measures 3.6 x 2.0 x 2.8 cm with normal blood flow. Repor 1 cm benign simple cysts of the right ovary.  The left ovary measures 2.4 x 0.9 x 0.8 cm  with normal blood flow.  There is no free pelvic fluid.   Impression   3.1 cm benign simple cysts of the right ovary.             Past Medical History:   Diagnosis Date    Acne     Depression     treated with medication last use , situational     GERD (gastroesophageal reflux disease)     High cholesterol     Retinal detachment        Past Surgical History:   Procedure Laterality Date     Retinal Tear OS        CRYOTHERAPY      RETINAL DETACHMENT SURGERY      RPK  OU Dr Munoz    2008    TONSILLECTOMY, ADENOIDECTOMY   "         ROS:  GENERAL: Feeling well overall.   SKIN: Reports mole on left breast.   HEAD: Denies head injury or headache.   NODES: Denies enlarged lymph nodes.   CHEST: Denies chest pain or shortness of breath.   CARDIOVASCULAR: Denies palpitations or left sided chest pain.   ABDOMEN: Reports slight RLQ abdominal discomfort. No nausea, vomiting or rectal bleeding.   URINARY: No dysuria or hematuria.  REPRODUCTIVE: See HPI.   BREASTS: Denies pain, lumps, or nipple discharge.   HEMATOLOGIC: No easy bruisability or excessive bleeding.   MUSCULOSKELETAL: Denies joint pain or swelling.   NEUROLOGIC: Denies syncope or weakness.   PSYCHIATRIC: Denies depression.    PE:   (chaperone present during entire exam)  APPEARANCE: Well nourished, well developed, in no acute distress.  BREAST:  Left breast with 2 mm small brown pigmented lesion consistent with mole.  ABDOMEN: Soft. Mild tenderness RLQ to deep palpation.  No guarding.  No rebound.      Diagnosis:  1. Right ovarian cyst        PLAN:    Orders Placed This Encounter    US Pelvis Comp with Transvag NON-OB (xpd       Patient was counseled today on the right ovarian cyst which is simple in appearance and should resolve with time.  We reviewed warning signs- she understands the need to contact us for any worsening in discomfort.  To confirm resolution of the cyst, she will have pelvic sono performed in 6-8 weeks.  Instructions / precautions reviewed.  We also discussed the small mole located on her left breast.  She will monitor - for any changes, she will need to see a dermatologist.     Follow-up for annual exam    Total time of visit: 20 minutes (counseling >75% of time)

## 2017-06-22 ENCOUNTER — TELEPHONE (OUTPATIENT)
Dept: GASTROENTEROLOGY | Facility: CLINIC | Age: 37
End: 2017-06-22

## 2017-06-23 ENCOUNTER — PATIENT MESSAGE (OUTPATIENT)
Dept: GASTROENTEROLOGY | Facility: CLINIC | Age: 37
End: 2017-06-23

## 2017-06-23 ENCOUNTER — OFFICE VISIT (OUTPATIENT)
Dept: GASTROENTEROLOGY | Facility: CLINIC | Age: 37
End: 2017-06-23
Payer: COMMERCIAL

## 2017-06-23 VITALS
DIASTOLIC BLOOD PRESSURE: 81 MMHG | HEART RATE: 71 BPM | BODY MASS INDEX: 21.56 KG/M2 | HEIGHT: 65 IN | WEIGHT: 129.44 LBS | SYSTOLIC BLOOD PRESSURE: 116 MMHG

## 2017-06-23 DIAGNOSIS — R14.3 EXCESSIVE GAS: ICD-10-CM

## 2017-06-23 DIAGNOSIS — R93.3 ABNORMAL CT SCAN, COLON: ICD-10-CM

## 2017-06-23 DIAGNOSIS — K57.30 DIVERTICULOSIS OF LARGE INTESTINE WITHOUT HEMORRHAGE: Primary | ICD-10-CM

## 2017-06-23 PROCEDURE — 99204 OFFICE O/P NEW MOD 45 MIN: CPT | Mod: S$GLB,,, | Performed by: PHYSICIAN ASSISTANT

## 2017-06-23 PROCEDURE — 99999 PR PBB SHADOW E&M-EST. PATIENT-LVL IV: CPT | Mod: PBBFAC,,, | Performed by: PHYSICIAN ASSISTANT

## 2017-06-23 NOTE — LETTER
June 23, 2017      KATE Rajan MD  [address] [place] [id number]           [facility]  [address]  [facility] [address]  Phone: [phone]  Fax: [phone]          Patient: Georgie Wetzel   MR Number: [id number]   YOB: 1980   Date of Visit: 6/23/2017       Dear Dr. KATE Rajan:    Thank you for referring Georgie Wetzel to me for evaluation. Attached you will find relevant portions of my assessment and plan of care.    If you have questions, please do not hesitate to call me. I look forward to following Georgie Wetzel along with you.    Sincerely,    Adrian Tuttle PA-C    Enclosure  CC:  No Recipients    If you would like to receive this communication electronically, please contact [email or web address] or [phone] to request more information on [employer] Link access.    For providers and/or their staff who would like to refer a patient to [facility], please contact us through our one-stop-shop provider referral line, [facility], at [phone].    If you feel you have received this communication in error or would no longer like to receive these types of communications, please e-mail [email or web address]

## 2017-06-23 NOTE — PROGRESS NOTES
[facility] Consultation Note    Reason for Consult:  The primary encounter diagnosis was Diverticulosis of large intestine without hemorrhage. Diagnoses of Abnormal CT scan, colon and Excessive gas were also pertinent to this visit.    PCP:   ELISA Segura   [address] [facility] / [facility] [address]    Referring MD:  KATE Segura Md  [address] [address]    HPI:  This is a 36 y.o. female here for ED f/u  Seen in the ED 6/14 after developing RLQ pain after drinking a miralax bowel prep for feeling constipated  Pertinent labs: wbc - wnl, Hgb 11.5, CRP 72.6  Pertinent imaging: CT scan inflammation around the cecum with suggestion with suggestions of an associated diverticulum, 2.8 cm right adnexal cystic focus, likely dominant ovarian follicle  Discharge meds: Cipro x 2 weeks    Since discharge she has seen her GYN who felt that her RLQ pain was related to her right ovarian cyst    Today she says that she is feeling much better  No further abdominal pain  Has been on a low fiber diet  Holds in BMs if she is not at home  Food triggers- diarrhea after eating torilla and alcohol, large amts of ice cream  + excessive gas  Taking probiotics    Hx of acid reflux  Has been on nexium since 2005    ROS:  Constitutional: No fevers, chills, No weight loss  ENT: No allergies  CV: No chest pain  Pulm: No cough, No shortness of breath  Ophtho: No vision changes  GI: see HPI  Derm: No rash  Heme: No lymphadenopathy, No bruising  MSK: No arthritis  : No dysuria, No hematuria  Endo: No hot or cold intolerance  Neuro: No syncope, No seizure  Psych: No anxiety, No depression    Medical History:  has a past medical history of Acne; Depression; GERD (gastroesophageal reflux disease); High cholesterol; and Retinal detachment.    Surgical History:  has a past surgical history that includes TONSILLECTOMY, ADENOIDECTOMY;  Retinal Tear OS  2005; RPK  OU Dr Munoz   (7/2008);  "Retinal detachment surgery; and Cryotherapy.    Family History: family history includes Acne in her brother; Cancer in her mother; Cataracts in her maternal grandfather; Diabetes in her maternal grandmother; Glaucoma in her maternal grandfather; Ovarian cancer in her mother; Scleroderma in her paternal grandmother; Stroke in her paternal grandmother..     Social History:  reports that she has never smoked. She has never used smokeless tobacco. She reports that she drinks alcohol. She reports that she does not use drugs.    Review of patient's allergies indicates:   Allergen Reactions    No known drug allergies        Current Outpatient Prescriptions on File Prior to Visit   Medication Sig Dispense Refill    ACZONE 5 % topical gel APPLY A THIN FILM TO FACE IN THE MORNING  6    clobetasol 0.05% (TEMOVATE) 0.05 % Oint APPLY TO HANDS TWICE A DAY AS NEEDED  2    hydrocodone-acetaminophen 5-325mg (NORCO) 5-325 mg per tablet Take 1 tablet by mouth every 4 (four) hours as needed for Pain. 20 tablet 0    LACTOBACILLUS ACIDOPHILUS (ACIDOPHILUS ORAL) Take by mouth.      norethindrone-ethinyl estradiol (MICROGESTIN /20) 1-20 mg-mcg per tablet TAKE 1 TABLET BY MOUTH ONCE DAILY. 21 tablet 6    omeprazole (PRILOSEC OTC) 20 MG tablet Take 40 mg by mouth once daily.       spironolactone (ALDACTONE) 50 MG tablet Take 100 mg by mouth once daily.  3    SS 10-2 10-2 % Clsr USE TO WASH FACE EVERY DAY  6    ZIANA gel APPLY A THIN FILM TO FACE AT BEDTIME  6    [] ciprofloxacin HCl (CIPRO) 500 MG tablet Take 1 tablet (500 mg total) by mouth 2 (two) times daily. 20 tablet 0     No current facility-administered medications on file prior to visit.          Objective Findings:    Vital Signs:  /81   Pulse 71   Ht 5' 5" (1.651 m)   Wt 58.7 kg (129 lb 6.6 oz)   LMP 2017 (Approximate)   BMI 21.53 kg/m²   Body mass index is 21.53 kg/m².    Physical Exam:  General Appearance: Well appearing in no acute " distress  Head:   Normocephalic, without obvious abnormality  Eyes:    No scleral icterus  ENT: Neck supple, Lips, mucosa, and tongue normal  Lungs: CTA bilaterally in anterior and posterior fields, no wheezes, no crackles.  Heart:  Regular rate and rhythm, S1, S2 normal, no murmurs heard  Abdomen: Soft, non tender, non distended with positive bowel sounds in all four quadrants.   Extremities: no edema  Skin: No rash  Neurologic: AAO x 3      Labs:  Lab Results   Component Value Date    WBC 12.27 06/14/2017    HGB 11.5 (L) 06/14/2017    HCT 34.9 (L) 06/14/2017     06/14/2017    CHOL 285 (H) 10/29/2015    TRIG 30 10/29/2015     (H) 10/29/2015    ALT 12 06/14/2017    AST 13 06/14/2017     06/14/2017    K 3.9 06/14/2017     06/14/2017    CREATININE 0.7 06/14/2017    BUN 8 06/14/2017    CO2 22 (L) 06/14/2017    TSH 0.559 08/30/2016    INR 0.9 06/14/2017    GLUF 94 07/01/2011       Imaging:    Endoscopy:      Assessment:  1. Diverticulosis of large intestine without hemorrhage    2. Abnormal CT scan, colon    3. Excessive gas    Single diverticulum of the cecum.     37yo F rently seen in ED for RLQ pain after drinking a laxative bowel prep. Abnormal CT that revealed cecum wall thickening with an associated diverticulum. RLQ has resolved taking cipro, but also possibly due to an ovarian cyst    Recommendations:  1. schedule colonoscopy  After Aug 13 to visualize the diverticulum and rule out malignancies    2. sadgas diet    Return if symptoms worsen or fail to improve.      Order summary:  Orders Placed This Encounter    Case request GI: COLONOSCOPY         Thank you so much for allowing me to participate in the care of Georgie Tuttle PA-C

## 2017-06-23 NOTE — PATIENT INSTRUCTIONS
"    What to Eat and What Not to Eat       (to reduce bloating)    Avoid drinking from straws  Avoid eating excessively fast  Avoid eating excessively slow  Avoid gum chewing         Drinks  Cut out: Dairy, soda (regular and diet), sports drinks, fruit drinks, alcohol, caffeine, soy milk, carbonated beverages, kombucha    Drink: Water (1-2 Liters a day), coconut water, herbal tea, green juices (kale, spinach, green apple), smoothies (berries, bananas, amond milk, ice, flax seed).  Drink at the end of the meal; not during.         Food    Eliminate:  Eliminate all of these foods and ingredients for 10 days. Once the bloating has reduced, can add back one at a time to see which ones your body can tolerate.    Soy  Artificial sweeteners - sugar alcohols, splenda, aspartame  Dairy  Gluten  Alcohol  Sugar - no added sugars (glucose, fructose, maltose, dextrose, corn and maple syrup, honey, agave, stevia)    Limit:  Beans, broccoli, cabbage  Caffeine (1 small cup of coffee or tea a day)  Fatty meals  Meat  Processed foods (if it comes in a box, has more than 10 listed ingredients, has an expiration date)  Salt    Eat:  50% of daily intake should be food eaten in its natural, raw state.  Leafy greens - Kale, spinach, chard, collards, parsley, turnip and mustard greens, arugula, bok jose alfredo, jose manuel lettuce  Fiber - favor plant based sources, not processed fibers (cereals, fiber in a box)  Papaya and Pineapple  Brightly colored produce - strawberries, blueberries, bell peppers, lemon, spinach      Adapted from "Gutbliss" by Dr. Mumtaz Barragan      Low Fodmap Diet    Fodmaps (fructo,oligo,mono saccharides and polyols) are contained in certain foods and can cause significant bloating in some people. Reducing these foods can reduce bloating. Start off by cutting out anything with high fructose corn syrup in the ingredients.        Foods suitable on a low-fodmap diet  fruit  banana, blueberry, boysenberry, canteloupe, cranberry, " durian, grape,  grapefruit, honeydew melon, kiwifruit, lemon,lime, mandarin, orange,  passionfruit, pawpaw, raspberry, rhubarb, rockmelon, star anise,  strawberry, tangelo  vegetables  alfalfa, artichoke, bamboo shoots, bean shoots, bok jose alfredo,  carrot, celery, choko, jose alfredo sum, endive, andrew, green beans,  lettuce, olives, parsnip, potato, pumpkin, red capsicum (bell pepper),  silver beet, spinach, summer squash (yellow), swede, sweet potato, taro, tomato,  turnip, yam, zucchini   herbs  basil, chili, coriander, andrew, lemongrass,   marjoram, mint, oregano, parsley, rosemary, thyme  milk  lactose-free milk, oat milk*, rice milk, soy milk*  cheeses  hard cheeses, and brie and camembert  yoghurt  lactose-free varieties  ice-cream substitutes  gelati, sorbet  butter substitutes  olive oilgrain foods  cereals  gluten-free bread or cereal products  bread  100% spelt bread  Rice, oats, polenta, other  arrowroot, millet, psyllium, quinoa, sorgum, tapioca  sweeteners  sugar* (sucrose), glucose, artificial sweeteners not ending in -ol  honey substitutes  jaeger syrup*, maple syrup*, molasses, treacle  *small quantities  *check for additives  Note: if fruit is dried, eat in small quantities    excess fructose lactose fructans galactans polyols      Eliminate foods containing fodmaps  fruit  apple, apricot, avocado, blackberry, cherry, lychee, nashi, nectarine, peach, pear, plum,  prune, watermelon, karl, tinned fruit in natural juice,   sweeteners  sorbitol (420)  mannitol (421)  isomalt (953)  maltitol (965)  xylitol (967)  legumes  baked beans, chickpeas, kidney beans, lentils  vegetables  asparagus, beetroot, broccoli, brussels sprouts, cabbage, eggplant, fennel, garlic,  jose, okra, onion (all), shallots, spring onion, cauliflower, green capsicum (bell pepper), mushroom, sweet corn  cereals  wheat and rye, in large amounts eg. Bread, crackers, cookies, couscous, pasta  fruit  custard apple, persimmon,  watermelon  miscellaneous  chicory, dandelion, inulin  sweeteners  fructose, high fructose corn syrup  large total fructose dose  concentrated fruit sources, large servings of fruit, dried fruit, fruit juice  honey  corn syrup, fruisana  milk  milk from cows, goats or sheep, custard, ice cream, yoghurt  cheeses  soft unripened cheeses eg. cottage, cream, mascarpone    Additional FODMAPs Diet Reading List    IBS-Free At Last!: Second Edition: Change Your Carbs, Change Your Life with the FODMAP Elimination Diet, 2 nd edition by Lida Obregon, MS, RD     2012 copyright; [employer].   ISBN: [id number]    The Complete Idiots Guide To Living Well With IBS by Gaviota Zavala RD, LDN  [facility], 2010    The FODMAPs Approach:--Minimize Consumption of Fermentable Carbs to Manage Functional Gut Disorder Symptoms  by Gaviota Zavala RD, LDN article found in  Todays Dietitian, vol. 12, no. 8, p. 30    The Inside Tract: Your Good Gut Guide To Great Digestive Health by Fredo Araujo MD and Rebeca Joseph, MS, RD, LDN  2011 copyright, [employer]   ISBN: [id number]    List assembled by: Shena Jerome, MS, RD, LDN, E  [facility]  Last Updated: 7/13/12    [email or web address]  Dropping Acid The Reflux Diet Cookbook and Cure -  Oziel Jeff M.D.    GERD  Worst Foods for Acid Reflux  Chocolate (milk chocolate worse than dark chocolate)  Soda (all carbonated beverages)  Alcohol (beer, liquor, wine)  Fried foods  Ford, sausage, ribs  Cream sauce  Fatty meats (beef)  Butter, margarine, lard, shortening  Coffee, tea  Mint   High fat nuts  Hot sauces and pepper  Citrus fruit/juices      Acidic foods (pH - 1 is MORE acidic, 5 is LESS acidic)     Do not eat or drink these (lower numbers are worse)    Induction diet - For 2 weeks eat nothing below pH 5     Lemon juice 2.3  Grape cranberry juice 2.5  Stomach Acid 2.5  Gelatin Dessert 2.6  Lemon/lime 2.9/2.7  Vinegar  2.9  Gatorade 3.0  Fruits - plums, apricots, strawberries, cherries 3.0  Vitamin C (ascorbic acid) 3.0  Iced tea, Snapple 3.1  Mustard 3.2  Soft drinks 3.3  Nectarines 3.3  Pomegranate 3.3  Applesauce 3.4  Grapefruit 3.4  Kiwi 3.4  Barbecue sauce 3.4  Caesar dressing 3.5  Thousand island dressing 3.6  Strawberries 3.5  Pineapple juice 3.5  Beer 3.5  Wine 3.5  Grape 3.6  Apples 3.6  Pineapple 3.7  Pickle 3.7  Blackberries, blueberries 3.7  Galen 3.7  Orange 3.8  Cherries 3.9  Red Bull 3.9  Tomatoes 4.2  Coffee 5.1      These are Safe foods:  Agave  Aloe Vera  Apple (only red)  Bagels  Banana (worsens reflux in 1%)  Beans - black, red, lima, lentils  Bread - whole grain, rye  Caramel  Celery  Chamomile tea  Chicken - skinless, never fried  Chicken stock or bouillon  Coffee - one cup/day with milk  Fennel  Fish  Anya  Green vegetables (no green peppers)  Herbs  Honey  Melon  Milk - skin, soy, or Lactaid skim milk  Mushrooms  Oatmeal  Olive oil  Parsley  Pasta  Pears  Popcorn  Potatoes  Red bell peppers  Rice  Soups  Tofu  Turkey Breast  Turnip  Vegetables - no onion, tomatoes, peppers  Vinaigrette  Water - non carbonated  Whole grain breads, crackers, breakfast cereals      Best Foods for Acid Reflux  Whole grain breads  Oatmeal  Aloe Vera  Salad (no tomatoes, onions, cheese, or high fat dressing)  Banana  Melon  Fennel  Chicken and turkey (skinless, never fried)  Fish/seafood (never fried)  Celery  Parsley  Couscous and Rice    Maybe bad foods (Everyone is unique)  Tomatoes  Garlic  Onion  Nuts (macadamia nuts)  Apples (especially green)  Cucumber  Green peppers  Spicy food  Some herbal teas    GERD tips  Change what you eat:  Eat smaller meals  Eat slowly and chew thoroughly until food is almost liquid  Cut down on junk carbohydrates such as sugar and white flour  Use herbs in your cooking  Eat more raw foods (more than 10 ingredients is not a raw food)  Avoid trans fats and partially hydrogenated oils  Eat more  fish and switch to grass fed beef  Switch your cooking oil to macadamia nut or olive oil  Watch extremes of salt intake (too high or too low is bad)    If just cutting out acidic foods is not enough, change how you eat:  Large breakfast, medium lunch, light dinner  Dont mix fruit juices, sweet fruits, and refined starches with meats and heavy food  Dont wash your food down with a lot of liquid    List A Proteins - meat, poultry, cheese, eggs, fish, beans, yogurt    List B Neutral - vegetables, salads, seeds, nuts, herbs, cream, butter, olive oil    List C Starches - biscuit, breads, cake, crackers, oats, pasta, potatoes, rice, sugar/honey, sweets    A + B = ok  B + C = ok  Never mix list A and C!!    Change these habits:  Stop smoking  Eat dinner earlier (3-4 hours before lying down to sleep)  Elevate the head of your bed 6 inches (blocks under the head of the bed are better than pillows)  Exercise (but wait 2 hours after eating)  Drink more water (between meals)    Take these supplements:  Multi vitamin  Probiotic  Fish oil    Most common food allergens: milk, eggs, peanuts, tree nuts, fish, shellfish, wheat, and soy    All natural immediate relief:  Chew 2-3 soft probiotic capsules - Dr. Claros's Probiotics 12 Plus  Chew chewable DGL licorice tablet  Chew papaya tablet with high protein meal - [employer]  Drink 2 ounces of aloe vera juice  Swedish bitters  Prelief- reduce the acid in food to keep it form burning sensitive tissue  Iberogast  Slippery Elm  Drink Chamomile Tea  Teaspoon of baking soda in water  Spoonful of vinegar in water      All natural ulcer healers:  Zinc carnosine - 75.5 mg with food twice a day x 8 weeks   Hannah Yan - $8 for 60 pills  DGL (deglycyrrhizinated licorice) - 2 tablets before meals. Heals stomach lining   Natural Factors brand, Enzymatic therapy brand.  Aloe Vera juice  - 2 to 8 ounces a day   [place] or [place]

## 2017-06-26 ENCOUNTER — PATIENT MESSAGE (OUTPATIENT)
Dept: OBSTETRICS AND GYNECOLOGY | Facility: CLINIC | Age: 37
End: 2017-06-26

## 2017-06-26 ENCOUNTER — PATIENT MESSAGE (OUTPATIENT)
Dept: INTERNAL MEDICINE | Facility: CLINIC | Age: 37
End: 2017-06-26

## 2017-06-27 ENCOUNTER — LAB VISIT (OUTPATIENT)
Dept: LAB | Facility: HOSPITAL | Age: 37
End: 2017-06-27
Attending: INTERNAL MEDICINE
Payer: COMMERCIAL

## 2017-06-27 ENCOUNTER — TELEPHONE (OUTPATIENT)
Dept: INTERNAL MEDICINE | Facility: CLINIC | Age: 37
End: 2017-06-27

## 2017-06-27 ENCOUNTER — OFFICE VISIT (OUTPATIENT)
Dept: INTERNAL MEDICINE | Facility: CLINIC | Age: 37
End: 2017-06-27
Payer: COMMERCIAL

## 2017-06-27 VITALS
WEIGHT: 127.88 LBS | TEMPERATURE: 98 F | HEIGHT: 65 IN | SYSTOLIC BLOOD PRESSURE: 130 MMHG | HEART RATE: 84 BPM | BODY MASS INDEX: 21.31 KG/M2 | DIASTOLIC BLOOD PRESSURE: 90 MMHG

## 2017-06-27 DIAGNOSIS — K52.9 COLITIS: ICD-10-CM

## 2017-06-27 DIAGNOSIS — R10.31 ABDOMINAL PAIN, RLQ: ICD-10-CM

## 2017-06-27 DIAGNOSIS — D75.839 THROMBOCYTOSIS: Primary | ICD-10-CM

## 2017-06-27 DIAGNOSIS — B37.2 CUTANEOUS CANDIDIASIS: Primary | ICD-10-CM

## 2017-06-27 LAB
BASOPHILS # BLD AUTO: 0.04 K/UL
BASOPHILS NFR BLD: 0.5 %
CRP SERPL-MCNC: 1 MG/L
DIFFERENTIAL METHOD: ABNORMAL
EOSINOPHIL # BLD AUTO: 0.2 K/UL
EOSINOPHIL NFR BLD: 2 %
ERYTHROCYTE [DISTWIDTH] IN BLOOD BY AUTOMATED COUNT: 13 %
HCT VFR BLD AUTO: 39.1 %
HGB BLD-MCNC: 12.9 G/DL
LYMPHOCYTES # BLD AUTO: 3.3 K/UL
LYMPHOCYTES NFR BLD: 44.4 %
MCH RBC QN AUTO: 31.6 PG
MCHC RBC AUTO-ENTMCNC: 33 %
MCV RBC AUTO: 96 FL
MONOCYTES # BLD AUTO: 0.5 K/UL
MONOCYTES NFR BLD: 6.3 %
NEUTROPHILS # BLD AUTO: 3.4 K/UL
NEUTROPHILS NFR BLD: 46.5 %
PLATELET # BLD AUTO: 462 K/UL
PMV BLD AUTO: 10.4 FL
RBC # BLD AUTO: 4.08 M/UL
WBC # BLD AUTO: 7.41 K/UL

## 2017-06-27 PROCEDURE — 99213 OFFICE O/P EST LOW 20 MIN: CPT | Mod: S$GLB,,, | Performed by: INTERNAL MEDICINE

## 2017-06-27 PROCEDURE — 86140 C-REACTIVE PROTEIN: CPT

## 2017-06-27 PROCEDURE — 99999 PR PBB SHADOW E&M-EST. PATIENT-LVL III: CPT | Mod: PBBFAC,,, | Performed by: INTERNAL MEDICINE

## 2017-06-27 PROCEDURE — 85025 COMPLETE CBC W/AUTO DIFF WBC: CPT

## 2017-06-27 PROCEDURE — 36415 COLL VENOUS BLD VENIPUNCTURE: CPT

## 2017-06-27 RX ORDER — NYSTATIN 100000 U/G
CREAM TOPICAL 2 TIMES DAILY
Qty: 30 G | Refills: 0 | Status: SHIPPED | OUTPATIENT
Start: 2017-06-27 | End: 2017-10-17

## 2017-06-27 NOTE — PROGRESS NOTES
Subjective:       Patient ID: Georgie Wetzel is a 36 y.o. female.    Chief Complaint: Medication Problem (since pt been on cipro she has anal itching)    Patient presents for an urgent visit c/o anal itching after a course of Cipro. Had an ER visit 6/14/17 for abdominal pain. CT scan revealed bowel wall thickening and inflammation, Urine had few WBC's and bacteria. She was prescribed Cipro 500mg BID x 10 days which she just completed two days ago. About half-way through the treatment she developed vaginal itching without discharge and itching in the anal area. She used Vagisil (cortisone) in the front with relief, anal area is still bothersome. No pain or swelling. Abdominal pain has subsided. Had repeat labs done this morning (results pending) to see if CRP is coming down. And has seen Gastroenterology - Colonoscopy ordered, to be done in 5-6 weeks.     Past history, meds and allergies reviewed.         Review of Systems   Constitutional: Negative for chills, diaphoresis and fever.   Gastrointestinal: Negative for abdominal pain, anal bleeding, diarrhea and rectal pain.       Objective:    /90, Pulse 84, Temp 98.2  Physical Exam   Constitutional: She appears well-developed and well-nourished.   HENT:   Nose: Nose normal.   Mouth/Throat: Oropharynx is clear and moist.   Eyes: Conjunctivae are normal. No scleral icterus.   Genitourinary:   Genitourinary Comments: Mild erythema of skin surrounding anus, extends a couple of centimeters up the gluteal crease. No hemorrhoids. No induration, fluctuance, open sores or drainage.        Assessment:       1. Cutaneous candidiasis    2. Colitis        Plan:       Cutaneous candidiasis  -     nystatin (MYCOSTATIN) cream; Apply topically 2 (two) times daily.  Dispense: 30 g; Refill: 0    Colitis        -     Has completed antibiotics and does not appear to need additional treatment, f/u with GI for C-scope as planned.

## 2017-06-30 DIAGNOSIS — K57.30 DIVERTICULOSIS OF LARGE INTESTINE WITHOUT HEMORRHAGE: Primary | ICD-10-CM

## 2017-06-30 RX ORDER — POLYETHYLENE GLYCOL 3350, SODIUM SULFATE ANHYDROUS, SODIUM BICARBONATE, SODIUM CHLORIDE, POTASSIUM CHLORIDE 236; 22.74; 6.74; 5.86; 2.97 G/4L; G/4L; G/4L; G/4L; G/4L
4 POWDER, FOR SOLUTION ORAL ONCE
Qty: 4000 ML | Refills: 0 | Status: SHIPPED | OUTPATIENT
Start: 2017-06-30 | End: 2017-06-30

## 2017-07-05 ENCOUNTER — HOSPITAL ENCOUNTER (OUTPATIENT)
Dept: RADIOLOGY | Facility: HOSPITAL | Age: 37
Discharge: HOME | End: 2017-07-05
Attending: INTERNAL MEDICINE
Payer: COMMERCIAL

## 2017-07-05 DIAGNOSIS — K76.9 LIVER LESION: ICD-10-CM

## 2017-07-05 PROCEDURE — A9585 GADOBUTROL INJECTION: HCPCS | Performed by: INTERNAL MEDICINE

## 2017-07-05 PROCEDURE — 74183 MRI ABD W/O CNTR FLWD CNTR: CPT | Mod: TC

## 2017-07-05 PROCEDURE — 25500020 PHARM REV CODE 255: Performed by: INTERNAL MEDICINE

## 2017-07-05 PROCEDURE — 74183 MRI ABD W/O CNTR FLWD CNTR: CPT | Mod: 26,,, | Performed by: RADIOLOGY

## 2017-07-05 RX ORDER — GADOBUTROL 604.72 MG/ML
10 INJECTION INTRAVENOUS
Status: COMPLETED | OUTPATIENT
Start: 2017-07-05 | End: 2017-07-05

## 2017-07-05 RX ADMIN — GADOBUTROL 10 ML: 604.72 INJECTION INTRAVENOUS at 04:07

## 2017-07-06 ENCOUNTER — TELEPHONE (OUTPATIENT)
Dept: TRANSPLANT | Facility: CLINIC | Age: 37
End: 2017-07-06

## 2017-07-06 ENCOUNTER — TELEPHONE (OUTPATIENT)
Dept: INTERNAL MEDICINE | Facility: CLINIC | Age: 37
End: 2017-07-06

## 2017-07-06 ENCOUNTER — DOCUMENTATION ONLY (OUTPATIENT)
Dept: TRANSPLANT | Facility: CLINIC | Age: 37
End: 2017-07-06

## 2017-07-06 DIAGNOSIS — K76.9 LIVER LESION: Primary | ICD-10-CM

## 2017-07-06 NOTE — TELEPHONE ENCOUNTER
Called and spoke with patient regarding liver MRI.  Area too small to characterize but likely hemangioma.  Will refer to hepatology for further evaluation.  Patient verbalized understanding.    Called hepatology to discuss patient.  Await call back.

## 2017-07-06 NOTE — NURSING
Pt records reviewed.   Pt will be referred to Hepatology.  Abnormal imaging, liver lesion  Initial referral received  from the workque.   Referring Provider/diagnosis  Nita Stanford PA-C   Referral letter sent to provider and patient.

## 2017-07-06 NOTE — LETTER
July 6, 2017    Georgie Wetzel  [address] [address]      Dear Georgie Wetzel:    Your doctor has referred you to the [facility]. You will be contacted by our office and an initial appointment will then be scheduled for you.    We look forward to seeing you soon. If you have any further questions, please contact us at [phone].       Sincerely,        [facility]   [address] [place] [phone]

## 2017-07-06 NOTE — LETTER
July 6, 2017    Nita Stanford PA-C  [address]  [place] [address]      Dear Dr. Stanford    Patient: Georgie Wetzel   MR Number: [id number]   YOB: 1980     Thank you for the referral of Georgie Wetzel to the [facility]. An initial appointment will be scheduled for your patient with one of our Hepatologists.      Thank you again for your trust in our program.  If there is anything we can do for you or your staff, please feel free to contact us.        Sincerely,        [facility]  [facility] [place] [address]  [phone]

## 2017-07-06 NOTE — TELEPHONE ENCOUNTER
----- Message from Rashmi Acuna sent at 7/6/2017  8:54 AM CDT -----  Contact: [facility]   Calling to speak with you about a internal referral that was entered in the system on this patient. Please call [phone]

## 2017-07-06 NOTE — LETTER
July 6, 2017    Georgie Wetzel      Dear Dr. Wetzel    Patient: Georgie Wetzel   MR Number: [id number]   YOB: 1980     Thank you for the referral of Georgie Wetzel to the [facility]. An initial appointment will be scheduled for your patient with one of our Hepatologists.      Thank you again for your trust in our program.  If there is anything we can do for you or your staff, please feel free to contact us.        Sincerely,        [facility]  [address] [place] [id number]  [phone]

## 2017-07-19 ENCOUNTER — OFFICE VISIT (OUTPATIENT)
Dept: HEPATOLOGY | Facility: CLINIC | Age: 37
End: 2017-07-19
Payer: COMMERCIAL

## 2017-07-19 VITALS
BODY MASS INDEX: 21.19 KG/M2 | OXYGEN SATURATION: 100 % | RESPIRATION RATE: 16 BRPM | SYSTOLIC BLOOD PRESSURE: 132 MMHG | WEIGHT: 127.19 LBS | DIASTOLIC BLOOD PRESSURE: 63 MMHG | HEIGHT: 65 IN | HEART RATE: 69 BPM | TEMPERATURE: 97 F

## 2017-07-19 DIAGNOSIS — D18.03 HEMANGIOMA OF LIVER: Primary | ICD-10-CM

## 2017-07-19 PROCEDURE — 99204 OFFICE O/P NEW MOD 45 MIN: CPT | Mod: S$GLB,,, | Performed by: INTERNAL MEDICINE

## 2017-07-19 PROCEDURE — 99999 PR PBB SHADOW E&M-EST. PATIENT-LVL III: CPT | Mod: PBBFAC,,, | Performed by: INTERNAL MEDICINE

## 2017-07-19 NOTE — LETTER
July 19, 2017      Alan Hobson MD  [address]  [facility] [address]           [facility]  [address]  [facility] [address]  Phone: [phone]  Fax: [phone]          Patient: Georgie Wetzel   MR Number: [id number]   YOB: 1980   Date of Visit: 7/19/2017       Dear Dr. Alan Hobson:    Thank you for referring Georgie Wetzel to me for evaluation. Attached you will find relevant portions of my assessment and plan of care.    If you have questions, please do not hesitate to call me. I look forward to following Georgie Wetzel along with you.    Sincerely,    Jaja Purvis MD    Enclosure  CC:  No Recipients    If you would like to receive this communication electronically, please contact [email or web address] or [phone] to request more information on [employer] Link access.    For providers and/or their staff who would like to refer a patient to [facility], please contact us through our one-stop-shop provider referral line, [facility], at [phone].    If you feel you have received this communication in error or would no longer like to receive these types of communications, please e-mail [email or web address]

## 2017-07-19 NOTE — PATIENT INSTRUCTIONS
You have no evidence of liver disease.    Hemangioma is a benign lesion.  You do not need to change your diet or other lifestyle measures.    You do not require further testing or follow-up with this lesion.    Return to clinic on an as needed basis

## 2017-07-19 NOTE — PROGRESS NOTES
Hepatology Consult Note    Referring provider: Dr. Alan Hobson    Chief complaint:   Chief Complaint   Patient presents with    Abnormal Abdominal/Liver Imaging       HPI:  Georgie Wetzel is a 36 y.o. female that presents to hepatology clinic for consultation of hemangioma.  She is alone.    The patient has no history of chronic liver disease.  Presented to ED with significant abdominal pain and finding of 0.9cm liver lesion most consistent with hemangioma.  The patient had subsequent MRI which is also consistent with hemangioma.  The patient has no symptoms of chronic liver disease.  Abdominal pain has resolved.  Plan for upcoming colonoscopy based on findings of cecel inflammation with possible associated diverticulitis.      Patient Active Problem List   Diagnosis    Hypogonadism    Hirsutism    Acne    Floater, vitreous    White without pressure of peripheral retina of both eyes    HLD (hyperlipidemia)    GERD (gastroesophageal reflux disease)    Amenorrhea       Past Medical History:   Diagnosis Date    Acne     Depression     treated with medication last use 2010, situational     GERD (gastroesophageal reflux disease)     High cholesterol     Retinal detachment        Past Surgical History:   Procedure Laterality Date     Retinal Tear OS  2005      CRYOTHERAPY      RETINAL DETACHMENT SURGERY      RPK  OU Dr Munoz    7/2008    TONSILLECTOMY, ADENOIDECTOMY         Family History   Problem Relation Age of Onset    Acne Brother     Cancer Mother     Ovarian cancer Mother     Diabetes Maternal Grandmother     Glaucoma Maternal Grandfather     Cataracts Maternal Grandfather     Stroke Paternal Grandmother     Scleroderma Paternal Grandmother     Melanoma Neg Hx        Social History     Social History    Marital status: Single     Spouse name: N/A    Number of children: N/A    Years of education: N/A     Occupational History          Social History Main Topics    Smoking  status: Never Smoker    Smokeless tobacco: Never Used    Alcohol use Yes      Comment: Social use of alcohol monthly     Drug use: No    Sexual activity: Yes     Partners: Male     Birth control/ protection: None     Other Topics Concern    Are You Pregnant Or Think You May Be? No    Breast-Feeding No     Social History Narrative    None       Current Outpatient Prescriptions   Medication Sig Dispense Refill    ACZONE 5 % topical gel APPLY A THIN FILM TO FACE IN THE MORNING  6    clobetasol 0.05% (TEMOVATE) 0.05 % Oint APPLY TO HANDS TWICE A DAY AS NEEDED  2    hydrocodone-acetaminophen 5-325mg (NORCO) 5-325 mg per tablet Take 1 tablet by mouth every 4 (four) hours as needed for Pain. 20 tablet 0    LACTOBACILLUS ACIDOPHILUS (ACIDOPHILUS ORAL) Take by mouth.      norethindrone-ethinyl estradiol (MICROGESTIN 1/20) 1-20 mg-mcg per tablet TAKE 1 TABLET BY MOUTH ONCE DAILY. 21 tablet 6    nystatin (MYCOSTATIN) cream Apply topically 2 (two) times daily. 30 g 0    omeprazole (PRILOSEC OTC) 20 MG tablet Take 40 mg by mouth once daily.       spironolactone (ALDACTONE) 50 MG tablet Take 100 mg by mouth once daily.  3    SS 10-2 10-2 % Clsr USE TO WASH FACE EVERY DAY  6    ZIANA gel APPLY A THIN FILM TO FACE AT BEDTIME  6     No current facility-administered medications for this visit.        Review of patient's allergies indicates:   Allergen Reactions    No known drug allergies        Review of Systems   Constitutional: Negative for chills, fever, malaise/fatigue and weight loss.   Eyes: Negative.    Respiratory: Negative for cough and shortness of breath.    Cardiovascular: Negative for chest pain and leg swelling.   Gastrointestinal: Negative for abdominal pain, heartburn, nausea and vomiting.   Musculoskeletal: Negative for joint pain and myalgias.   Skin: Negative for itching and rash.   Neurological: Negative for dizziness, focal weakness and headaches.   Endo/Heme/Allergies: Does not bruise/bleed  "easily.   Psychiatric/Behavioral: Negative for depression.       Vitals:    07/19/17 0842   BP: 132/63   Pulse: 69   Resp: 16   Temp: 96.6 °F (35.9 °C)   TempSrc: Oral   SpO2: 100%   Weight: 57.7 kg (127 lb 3.3 oz)   Height: 5' 5" (1.651 m)       Physical Exam   Constitutional: She is oriented to person, place, and time. She appears well-developed and well-nourished. No distress.   HENT:   Head: Normocephalic and atraumatic.   Mouth/Throat: Oropharynx is clear and moist.   Eyes: EOM are normal. Pupils are equal, round, and reactive to light. No scleral icterus.   Neck: Normal range of motion. Neck supple. No thyromegaly present.   Cardiovascular: Normal rate, regular rhythm and normal heart sounds.  Exam reveals no gallop and no friction rub.    No murmur heard.  Pulmonary/Chest: Effort normal. No respiratory distress. She has no wheezes. She has no rales.   Abdominal: Soft. Bowel sounds are normal. She exhibits no distension. There is no tenderness.   Musculoskeletal: Normal range of motion. She exhibits no edema.   Lymphadenopathy:     She has no cervical adenopathy.   Neurological: She is alert and oriented to person, place, and time. No cranial nerve deficit.   Skin: Skin is warm and dry. No rash noted.   Psychiatric: She has a normal mood and affect. Her behavior is normal.   Vitals reviewed.      Lab Results   Component Value Date    ALT 12 06/14/2017    AST 13 06/14/2017    ALKPHOS 46 (L) 06/14/2017    BILITOT 0.5 06/14/2017       Lab Results   Component Value Date    WBC 5.80 07/05/2017    HGB 12.2 07/05/2017    HCT 36.8 (L) 07/05/2017    MCV 93 07/05/2017     07/05/2017       Lab Results   Component Value Date     06/14/2017    K 3.9 06/14/2017     06/14/2017    CO2 22 (L) 06/14/2017    BUN 8 06/14/2017    CREATININE 0.7 06/14/2017    CALCIUM 9.0 06/14/2017    ANIONGAP 9 06/14/2017    ESTGFRAFRICA >60 06/14/2017    EGFRNONAA >60 06/14/2017       Imaging: CT and MRI reports personally " reviewed with patient     Assessment:  36 y.o. female presenting with incidental finding on hemangioma in the setting of normal liver.  Patient reassured that this is a benign condition and does not require further follow-up.  If she develops abnormal liver tests or symptoms in the future, repeat surveillance would be appropriate.  Otherwise no limitations of activity or lifestyle modifications required.    RTC prn

## 2017-08-01 ENCOUNTER — TELEPHONE (OUTPATIENT)
Dept: OBSTETRICS AND GYNECOLOGY | Facility: CLINIC | Age: 37
End: 2017-08-01

## 2017-08-01 ENCOUNTER — OFFICE VISIT (OUTPATIENT)
Dept: URGENT CARE | Facility: CLINIC | Age: 37
End: 2017-08-01
Payer: COMMERCIAL

## 2017-08-01 VITALS
SYSTOLIC BLOOD PRESSURE: 140 MMHG | HEART RATE: 70 BPM | BODY MASS INDEX: 20.83 KG/M2 | OXYGEN SATURATION: 100 % | HEIGHT: 65 IN | TEMPERATURE: 97 F | DIASTOLIC BLOOD PRESSURE: 89 MMHG | RESPIRATION RATE: 16 BRPM | WEIGHT: 125 LBS

## 2017-08-01 DIAGNOSIS — R10.2 VAGINAL PAIN: Primary | ICD-10-CM

## 2017-08-01 PROCEDURE — 99214 OFFICE O/P EST MOD 30 MIN: CPT | Mod: S$GLB,,, | Performed by: EMERGENCY MEDICINE

## 2017-08-01 NOTE — PATIENT INSTRUCTIONS
Please return here or go to the Emergency Department for any concerns or worsening of condition.  If you were prescribed antibiotics, please take them to completion.  If you were prescribed a narcotic medication, do not drive or operate heavy equipment or machinery while taking these medications.  Please follow up with your primary care doctor or specialist as needed.  If you  smoke, please stop smoking.

## 2017-08-01 NOTE — TELEPHONE ENCOUNTER
----- Message from Laura Rivera sent at 8/1/2017 12:57 PM CDT -----  Contact: Pt  x_ 1st Request  _ 2nd Request  _ 3rd Request    Who: Pt    Why: is experiencing a problem with a tampon. Pt is stating its urgent that she speak with staff today    What Number to Call Back: [phone]    When to Expect a call back: (Before the end of the day)  -- if call after 3:00 call back will be tomorrow.

## 2017-08-01 NOTE — PROGRESS NOTES
"Subjective:       Patient ID: Georgie Wetzel is a 36 y.o. female.    Vitals:  height is 5' 5" (1.651 m) and weight is 56.7 kg (125 lb). Her oral temperature is 97.3 °F (36.3 °C). Her blood pressure is 140/89 (abnormal) and her pulse is 70. Her respiration is 16 and oxygen saturation is 100%.     Chief Complaint: No chief complaint on file.    Pt with possible foreign body x 5-6 days. Pt unsure if she removed her tampon last Thursday night. Pt states she was slightly intoxicated. She did have sex that night. Pt currently asymptomatic. No vaginal discharge/irritation. No itching. No abdominal pain.       Foreign Body in Vagina   The incident occurred 5 to 7 days ago. Suspected object: tampon. The foreign body is suspected to be in the vagina. Pertinent negatives include no abdominal pain, fever or vomiting.     Review of Systems   Constitution: Negative for chills and fever.   Musculoskeletal: Negative for back pain.   Gastrointestinal: Negative for abdominal pain, nausea and vomiting.   Genitourinary: Negative for dysuria, genital sores, hematuria, missed menses, non-menstrual bleeding and urgency.       Objective:      Physical Exam   Constitutional: She is oriented to person, place, and time. She appears well-developed and well-nourished.   HENT:   Head: Normocephalic and atraumatic.   Right Ear: External ear normal.   Left Ear: External ear normal.   Nose: Nose normal. No nasal deformity. No epistaxis.   Mouth/Throat: Oropharynx is clear and moist and mucous membranes are normal.   Eyes: Conjunctivae and lids are normal.   Neck: Trachea normal, normal range of motion and phonation normal. Neck supple.   Cardiovascular: Normal rate, regular rhythm, normal heart sounds and normal pulses.    Pulmonary/Chest: Effort normal and breath sounds normal.   Abdominal: Soft. Normal appearance and bowel sounds are normal. She exhibits no distension and no mass. There is no tenderness. There is no CVA tenderness. "   Genitourinary: Vagina normal. Cervix exhibits no motion tenderness and no discharge.   Genitourinary Comments: No FB seen or palpated   Neurological: She is alert and oriented to person, place, and time.   Skin: Skin is warm, dry and intact.   Psychiatric: She has a normal mood and affect. Her speech is normal and behavior is normal. Cognition and memory are normal.   Nursing note and vitals reviewed.      Assessment:       1. Vaginal pain        Plan:         Vaginal pain

## 2017-08-01 NOTE — TELEPHONE ENCOUNTER
Pt states thinks she took her tampon out on Friday but had a lot to drink and then had intercourse over the weekend now pt is freaking out and wants someone to check her. Pt denies discharge, pain, or odor. Offered pt appointment in the urgent care today but pt refused. Pt is traveling back from [place]. Advised pt she can come in the morning or go to another urgent care. Schedule pt`s appointment for the am. Pt will call back if she does not need the appointment

## 2017-08-16 ENCOUNTER — TELEPHONE (OUTPATIENT)
Dept: OBSTETRICS AND GYNECOLOGY | Facility: CLINIC | Age: 37
End: 2017-08-16

## 2017-08-16 ENCOUNTER — HOSPITAL ENCOUNTER (OUTPATIENT)
Dept: RADIOLOGY | Facility: OTHER | Age: 37
Discharge: HOME | End: 2017-08-16
Attending: OBSTETRICS & GYNECOLOGY
Payer: COMMERCIAL

## 2017-08-16 DIAGNOSIS — N83.201 RIGHT OVARIAN CYST: ICD-10-CM

## 2017-08-16 PROCEDURE — 76856 US EXAM PELVIC COMPLETE: CPT | Mod: 26,,, | Performed by: RADIOLOGY

## 2017-08-16 PROCEDURE — 76856 US EXAM PELVIC COMPLETE: CPT | Mod: TC

## 2017-08-16 PROCEDURE — 76830 TRANSVAGINAL US NON-OB: CPT | Mod: 26,,, | Performed by: RADIOLOGY

## 2017-08-16 NOTE — TELEPHONE ENCOUNTER
Called patient:    Discussed results of pelvic sono from 8/16/17:    Uterus measures 6.8 x 2.8 x 4.0cm.  No focal abnormality.  The endometrial stripe is not thickened measuring 0.3cm. trace fluid in the endocervical canal.  The ovaries are normal in size and appearance. Follicular activity bilaterally, with 2.1 cm follicle the left ovary.  Right ovary measures 2.3 x 2.0 x 2.5cm.  Left ovary measures 1.9 x 0.8 x 1.1cm.  There is normal arterial and venous flow bilaterally.  There is no evidence of free fluid within the pelvis.      Impression     Trace fluid in the endocervical canal.  Uterus and ovaries appear within normal limits.     Reports occasional cramping on RLQ.  Discussed normal appearing right adnexa - cyst has resolved.  She is scheduled for colonoscopy next week.

## 2017-08-24 ENCOUNTER — TELEPHONE (OUTPATIENT)
Dept: GASTROENTEROLOGY | Facility: CLINIC | Age: 37
End: 2017-08-24

## 2017-08-25 ENCOUNTER — SURGERY (OUTPATIENT)
Age: 37
End: 2017-08-25

## 2017-08-25 ENCOUNTER — ANESTHESIA (OUTPATIENT)
Dept: ENDOSCOPY | Facility: HOSPITAL | Age: 37
End: 2017-08-25
Payer: COMMERCIAL

## 2017-08-25 ENCOUNTER — HOSPITAL ENCOUNTER (OUTPATIENT)
Facility: HOSPITAL | Age: 37
Discharge: HOME | End: 2017-08-25
Attending: INTERNAL MEDICINE | Admitting: INTERNAL MEDICINE
Payer: COMMERCIAL

## 2017-08-25 ENCOUNTER — ANESTHESIA EVENT (OUTPATIENT)
Dept: ENDOSCOPY | Facility: HOSPITAL | Age: 37
End: 2017-08-25
Payer: COMMERCIAL

## 2017-08-25 VITALS
WEIGHT: 126 LBS | OXYGEN SATURATION: 100 % | HEART RATE: 67 BPM | TEMPERATURE: 98 F | RESPIRATION RATE: 18 BRPM | DIASTOLIC BLOOD PRESSURE: 62 MMHG | HEIGHT: 65 IN | BODY MASS INDEX: 20.99 KG/M2 | SYSTOLIC BLOOD PRESSURE: 106 MMHG

## 2017-08-25 DIAGNOSIS — K21.9 GASTROESOPHAGEAL REFLUX DISEASE, ESOPHAGITIS PRESENCE NOT SPECIFIED: Primary | ICD-10-CM

## 2017-08-25 LAB
B-HCG UR QL: NEGATIVE
CTP QC/QA: YES

## 2017-08-25 PROCEDURE — D9220A PRA ANESTHESIA: Mod: CRNA,,, | Performed by: NURSE ANESTHETIST, CERTIFIED REGISTERED

## 2017-08-25 PROCEDURE — 25000003 PHARM REV CODE 250: Performed by: INTERNAL MEDICINE

## 2017-08-25 PROCEDURE — 37000009 HC ANESTHESIA EA ADD 15 MINS: Performed by: INTERNAL MEDICINE

## 2017-08-25 PROCEDURE — 81025 URINE PREGNANCY TEST: CPT | Performed by: INTERNAL MEDICINE

## 2017-08-25 PROCEDURE — 37000008 HC ANESTHESIA 1ST 15 MINUTES: Performed by: INTERNAL MEDICINE

## 2017-08-25 PROCEDURE — D9220A PRA ANESTHESIA: Mod: ANES,,, | Performed by: ANESTHESIOLOGY

## 2017-08-25 PROCEDURE — 63600175 PHARM REV CODE 636 W HCPCS: Performed by: NURSE ANESTHETIST, CERTIFIED REGISTERED

## 2017-08-25 PROCEDURE — 45378 DIAGNOSTIC COLONOSCOPY: CPT | Mod: ,,, | Performed by: INTERNAL MEDICINE

## 2017-08-25 PROCEDURE — 45378 DIAGNOSTIC COLONOSCOPY: CPT | Performed by: INTERNAL MEDICINE

## 2017-08-25 RX ORDER — LIDOCAINE HCL/PF 100 MG/5ML
SYRINGE (ML) INTRAVENOUS
Status: DISCONTINUED | OUTPATIENT
Start: 2017-08-25 | End: 2017-08-25

## 2017-08-25 RX ORDER — SODIUM CHLORIDE 9 MG/ML
INJECTION, SOLUTION INTRAVENOUS CONTINUOUS
Status: DISCONTINUED | OUTPATIENT
Start: 2017-08-25 | End: 2017-08-25 | Stop reason: HOSPADM

## 2017-08-25 RX ORDER — PROPOFOL 10 MG/ML
VIAL (ML) INTRAVENOUS
Status: DISCONTINUED | OUTPATIENT
Start: 2017-08-25 | End: 2017-08-25

## 2017-08-25 RX ADMIN — PROPOFOL 40 MG: 10 INJECTION, EMULSION INTRAVENOUS at 07:08

## 2017-08-25 RX ADMIN — LIDOCAINE HYDROCHLORIDE 75 MG: 20 INJECTION, SOLUTION INTRAVENOUS at 07:08

## 2017-08-25 RX ADMIN — PROPOFOL 50 MG: 10 INJECTION, EMULSION INTRAVENOUS at 07:08

## 2017-08-25 RX ADMIN — LIDOCAINE HYDROCHLORIDE 25 MG: 20 INJECTION, SOLUTION INTRAVENOUS at 07:08

## 2017-08-25 RX ADMIN — SODIUM CHLORIDE: 0.9 INJECTION, SOLUTION INTRAVENOUS at 07:08

## 2017-08-25 RX ADMIN — PROPOFOL 30 MG: 10 INJECTION, EMULSION INTRAVENOUS at 07:08

## 2017-08-25 NOTE — H&P
Short Stay Endoscopy History and Physical    PCP - Nita Stanford PA-C    Procedure - Colonoscopy  ASA - per anesthesia  Mallampati - per anesthesia  History of Anesthesia problems - no  Family history Anesthesia problems - no   Plan of anesthesia - General    HPI:  This is a 36 y.o. female here for evaluation of : abnormal ct imaging. Cecal inflammation/tic      ROS:  Constitutional: No fevers, chills, No weight loss  CV: No chest pain  Pulm: No cough, No shortness of breath  GI: see HPI  Derm: No rash    Medical History:  has a past medical history of Acne; Depression; GERD (gastroesophageal reflux disease); High cholesterol; and Retinal detachment.    Surgical History:  has a past surgical history that includes TONSILLECTOMY, ADENOIDECTOMY;  Retinal Tear OS  2005; RPK  OU Dr Munoz   (7/2008); Retinal detachment surgery; and Cryotherapy.    Family History: family history includes Acne in her brother; Cancer in her mother; Cataracts in her maternal grandfather; Diabetes in her maternal grandmother; Glaucoma in her maternal grandfather; Ovarian cancer in her mother; Scleroderma in her paternal grandmother; Stroke in her paternal grandmother.. Otherwise no colon cancer, inflammatory bowel disease, or GI malignancies.    Social History:  reports that she has never smoked. She has never used smokeless tobacco. She reports that she does not drink alcohol or use drugs.    Review of patient's allergies indicates:   Allergen Reactions    No known drug allergies        Medications:   Prescriptions Prior to Admission   Medication Sig Dispense Refill Last Dose    clobetasol 0.05% (TEMOVATE) 0.05 % Oint APPLY TO HANDS TWICE A DAY AS NEEDED  2 Past Week at Unknown time    LACTOBACILLUS ACIDOPHILUS (ACIDOPHILUS ORAL) Take by mouth.   8/24/2017 at Unknown time    norethindrone-ethinyl estradiol (MICROGESTIN 1/20) 1-20 mg-mcg per tablet TAKE 1 TABLET BY MOUTH ONCE DAILY. 21 tablet 6 Past Week at Unknown time    nystatin  (MYCOSTATIN) cream Apply topically 2 (two) times daily. 30 g 0 Past Month at Unknown time    omeprazole (PRILOSEC OTC) 20 MG tablet Take 40 mg by mouth once daily.    8/24/2017 at Unknown time    spironolactone (ALDACTONE) 50 MG tablet Take 100 mg by mouth once daily.  3 8/24/2017 at Unknown time    SS 10-2 10-2 % Clsr USE TO WASH FACE EVERY DAY  6 Past Week at Unknown time    ZIANA gel APPLY A THIN FILM TO FACE AT BEDTIME  6 8/24/2017 at Unknown time    ACZONE 5 % topical gel APPLY A THIN FILM TO FACE IN THE MORNING  6 More than a month at Unknown time         Physical Exam:    Vital Signs:   Vitals:    08/25/17 0659   BP: (!) 155/70   Pulse: 66   Resp: 20   Temp: 97.7 °F (36.5 °C)       General Appearance: Well appearing in no acute distress  Eyes:    No scleral icterus  ENT: Neck supple, Lips, mucosa, and tongue normal; teeth and gums normal  Lungs: CTA bilaterally  Heart:  S1, S2 normal, no murmurs heard  Abdomen: Soft, non tender, non distended with positive bowel sounds. No hepatosplenomegaly, ascites, or mass.  Extremities: 2+ pulses, no clubbing, cyanosis or edema  Skin: No rash      Labs:  Lab Results   Component Value Date    WBC 5.80 07/05/2017    HGB 12.2 07/05/2017    HCT 36.8 (L) 07/05/2017     07/05/2017    CHOL 285 (H) 10/29/2015    TRIG 30 10/29/2015     (H) 10/29/2015    ALT 12 06/14/2017    AST 13 06/14/2017     06/14/2017    K 3.9 06/14/2017     06/14/2017    CREATININE 0.7 06/14/2017    BUN 8 06/14/2017    CO2 22 (L) 06/14/2017    TSH 0.559 08/30/2016    INR 0.9 06/14/2017    GLUF 94 07/01/2011       I have explained the risks and benefits of endoscopy procedures to the patient including but not limited to bleeding, perforation, infection, and death.      Vahid Lynn MD

## 2017-08-25 NOTE — ANESTHESIA PREPROCEDURE EVALUATION
2017  Georgie Wetzel is a 36 y.o., female.  Pre-operative evaluation for COLONOSCOPY (N/A)    Chief Complaint: liver lesion, divertiulitis    PMH:  Generally healhty. History of abdominal pain with ovarian cyst, divertiulits, and liver lesion suspected to be hemangioma.    Past Surgical History:   Procedure Laterality Date     Retinal Tear OS        CRYOTHERAPY      RETINAL DETACHMENT SURGERY      RPK  OU Dr Munoz    2008    TONSILLECTOMY, ADENOIDECTOMY           Vital Signs Range (Last 24H):  Temp:  [36.5 °C (97.7 °F)]   Pulse:  [66]   Resp:  [20]   BP: (155)/(70)   SpO2:  [100 %]       CBC:   No results for input(s): WBC, RBC, HGB, HCT, PLT, MCV, MCH, MCHC in the last 720 hours.    CMP: No results for input(s): NA, K, CL, CO2, BUN, CREATININE, GLU, MG, PHOS, CALCIUM, ALBUMIN, PROT, ALKPHOS, ALT, AST, BILITOT in the last 720 hours.    INR:  No results for input(s): INR, PROTIME, APTT in the last 720 hours.    Invalid input(s): PT      Diagnostic Studies:      EKD Echo:  Anesthesia Evaluation    I have reviewed the Patient Summary Reports.     I have reviewed the Nursing Notes.   I have reviewed the Medications.     Review of Systems  Anesthesia Hx:  No problems with previous Anesthesia    Social:  Non-Smoker, No Alcohol Use    Cardiovascular:  Cardiovascular Normal     Pulmonary:  Pulmonary Normal    Neurological:  Neurology Normal        Physical Exam  General:  Well nourished    Airway/Jaw/Neck:  Airway Findings: Mouth Opening: Normal Tongue: Normal  General Airway Assessment: Good  Mallampati: I  TM Distance: Normal, at least 6 cm  Jaw/Neck Findings:  Neck ROM: Normal ROM      Dental:  Dental Findings: In tact   Chest/Lungs:  Chest/Lungs Findings: Clear to auscultation, Normal Respiratory Rate     Heart/Vascular:  Heart Findings: Rate: Normal  Rhythm: Regular Rhythm  Sounds:  Normal        Mental Status:  Mental Status Findings:  Cooperative, Alert and Oriented         Anesthesia Plan  Type of Anesthesia, risks & benefits discussed:  Anesthesia Type:  general  Patient's Preference:   Intra-op Monitoring Plan:   Intra-op Monitoring Plan Comments:   Post Op Pain Control Plan:   Post Op Pain Control Plan Comments:   Induction:   IV  Beta Blocker:  Patient is not currently on a Beta-Blocker (No further documentation required).       Informed Consent: Patient understands risks and agrees with Anesthesia plan.  Questions answered. Anesthesia consent signed with patient.  ASA Score: 2     Day of Surgery Review of History & Physical:    H&P update referred to the surgeon.         Ready For Surgery From Anesthesia Perspective.

## 2017-08-25 NOTE — TRANSFER OF CARE
"Anesthesia Transfer of Care Note    Patient: Georgie Wetzel    Procedure(s) Performed: Procedure(s) (LRB):  COLONOSCOPY (N/A)    Patient location: PACU    Anesthesia Type: general    Transport from OR: Transported from OR on room air with adequate spontaneous ventilation    Post pain: pain needs to be addressed    Post assessment: no apparent anesthetic complications and tolerated procedure well    Post vital signs: stable    Level of consciousness: sedated    Nausea/Vomiting: no nausea/vomiting    Complications: none    Transfer of care protocol was followed      Last vitals:   Visit Vitals  BP (!) 155/70   Pulse 66   Temp 36.5 °C (97.7 °F) (Oral)   Resp 20   Ht 5' 5" (1.651 m)   Wt 57.2 kg (126 lb)   LMP 07/27/2017   SpO2 100%   Breastfeeding? No   BMI 20.97 kg/m²     "

## 2017-08-25 NOTE — DISCHARGE INSTRUCTIONS
Colonoscopy     A camera attached to a flexible tube with a viewing lens is used to take video pictures.     Colonoscopy is a test to view the inside of your lower digestive tract (colon and rectum). Sometimes it can show the last part of the small intestine (ileum). During the test, small pieces of tissue may be removed for testing. This is called a biopsy. Small growths, such as polyps, may also be removed.   Why is colonoscopy done?  The test is done to help look for colon cancer. And it can help find the source of abdominal pain, bleeding, and changes in bowel habits. It may be needed once a year, depending on factors such as your:  · Age  · Health history  · Family health history  · Symptoms  · Results from any prior colonoscopy  Risks and possible complications  These include:  · Bleeding               · A puncture or tear in the colon   · Risks of anesthesia  · A cancer lesion not being seen  Getting ready   To prepare for the test:  · Talk with your healthcare provider about the risks of the test (see below). Also ask your healthcare provider about alternatives to the test.  · Tell your healthcare provider about any medicines you take. Also tell him or her about any health conditions you may have.  · Make sure your rectum and colon are empty for the test. Follow the diet and bowel prep instructions exactly. If you dont, the test may need to be rescheduled.  · Plan for a friend or family member to drive you home after the test.     Colonoscopy provides an inside view of the entire colon.     You may discuss the results with your doctor right away or at a future visit.  During the test   The test is usually done in the hospital on an outpatient basis. This means you go home the same day. The procedure takes about 30 minutes. During that time:  · You are given relaxing (sedating) medicine through an IV line. You may be drowsy, or fully asleep.  · The healthcare provider will first give you a physical exam to  check for anal and rectal problems.  · Then the anus is lubricated and the scope inserted.  · If you are awake, you may have a feeling similar to needing to have a bowel movement. You may also feel pressure as air is pumped into the colon. Its OK to pass gas during the procedure.  · Biopsy, polyp removal, or other treatments may be done during the test.  After the test   You may have gas right after the test. It can help to try to pass it to help prevent later bloating. Your healthcare provider may discuss the results with you right away. Or you may need to schedule a follow-up visit to talk about the results. After the test, you can go back to your normal eating and other activities. You may be tired from the sedation and need to rest for a few hours.  Date Last Reviewed: 11/1/2016  © 2209-5119 [employer]. [address] [place] [address], [place] [address]. All rights reserved. This information is not intended as a substitute for professional medical care. Always follow your healthcare professional's instructions.

## 2017-08-25 NOTE — ANESTHESIA POSTPROCEDURE EVALUATION
"Anesthesia Post Evaluation    Patient: Georgie Wetzel    Procedure(s) Performed: Procedure(s) (LRB):  COLONOSCOPY (N/A)    Final Anesthesia Type: general  Patient location during evaluation: PACU  Patient participation: Yes- Able to Participate  Level of consciousness: awake and alert and oriented  Post-procedure vital signs: reviewed and stable  Pain management: adequate  Airway patency: patent  PONV status at discharge: No PONV  Anesthetic complications: no      Cardiovascular status: hemodynamically stable  Respiratory status: unassisted  Hydration status: euvolemic  Follow-up not needed.        Visit Vitals  /62 (BP Location: Left arm, Patient Position: Lying)   Pulse 67   Temp 36.6 °C (97.8 °F) (Skin)   Resp 18   Ht 5' 5" (1.651 m)   Wt 57.2 kg (126 lb)   LMP 07/27/2017   SpO2 100%   Breastfeeding? No   BMI 20.97 kg/m²       Pain/Sarah Score: Pain Assessment Performed: Yes (8/25/2017  8:21 AM)  Presence of Pain: denies (8/25/2017  8:21 AM)  Sarah Score: 10 (8/25/2017  8:21 AM)      "

## 2017-08-25 NOTE — PATIENT INSTRUCTIONS
Discharge Summary/Instructions after an Endoscopic Procedure  Patient Name: Georgie Wetzel  Patient MRN: [id number]  Patient YOB: 1980  Friday, August 25, 2017  Vahid Lynn MD  RESTRICTIONS:  During your procedure today, you received medications for sedation.  These   medications may affect your judgment, balance and coordination.  Therefore,   for 24 hours, you have the following restrictions:   - DO NOT drive a car, operate machinery, make legal/financial decisions,   sign important papers or drink alcohol.    ACTIVITY:  The following day: return to full activity including work, except no heavy   lifting, straining or running for 3 days if polyps were removed.  DIET:  Eat and drink normally unless instructed otherwise.  TREATMENT FOR COMMON SIDE EFFECTS:  - Mild abdominal pain, belching, bloating or excessive gas: rest, eat   lightly and use a heating pad.  - Sore Throat: treat with throat lozenges and/or gargle with warm salt   water.  SYMPTOMS TO WATCH FOR AND REPORT TO YOUR PHYSICIAN:  1. Abdominal pain or bloating, other than gas cramps.  2. Chest pain.  3. Back pain.  4. Chills or fever occurring within 24 hours after the procedure.  5. Rectal bleeding, which would show as bright red, maroon, or black stools.   (A tablespoon of blood from the rectum is not serious, especially if   hemorrhoids are present.)  6. Vomiting.  7. Weakness or dizziness.  8. Because air was used during the procedure, expelling large amounts of air   from your rectum or belching is normal.  9. If a bowel prep was taken, you may not have a bowel movement for 1-3   days.  This is normal.  GO DIRECTLY TO THE EMERGENCY ROOM IF YOU HAVE ANY OF THE FOLLOWING:   Difficulty breathing  Chills and/or fever over 101 F   Persistent vomiting and/or vomiting blood   Severe abdominal pain   Severe chest pain   Black, tarry stools   Bleeding- more than one tablespoon  Your doctor recommends these additional instructions:  If any biopsies  were taken, your doctorâs clinic will call you in 1 to 2   weeks with any results.  You have a contact number available for emergencies.  The signs and symptoms   of potential delayed complications were discussed with you.  You may return   to normal activities tomorrow.  Written discharge instructions were   provided to you.   You are being discharged to home.   Your physician has recommended a repeat colonoscopy at age 50 (please   contact the clinic prior to your 50th birthday to schedule) for screening   purposes.   The findings and recommendations were discussed with your designated   responsible adult.  For questions, problems or results please call your physician - Vahid Lynn MD at Work:  [phone].  [facility], EMERGENCY ROOM PHONE NUMBER: [phone]  IF A COMPLICATION OR EMERGENCY SITUATION ARISES AND YOU ARE UNABLE TO REACH   YOUR PHYSICIAN - GO DIRECTLY TO THE EMERGENCY ROOM.  Vahid Lynn MD  8/25/2017 7:53:43 AM  This report has been verified and signed electronically.

## 2017-09-01 ENCOUNTER — TELEPHONE (OUTPATIENT)
Dept: ENDOSCOPY | Facility: HOSPITAL | Age: 37
End: 2017-09-01

## 2017-10-04 ENCOUNTER — HOSPITAL ENCOUNTER (OUTPATIENT)
Dept: RADIOLOGY | Facility: HOSPITAL | Age: 37
Discharge: HOME | End: 2017-10-04
Attending: PHYSICIAN ASSISTANT
Payer: COMMERCIAL

## 2017-10-04 ENCOUNTER — OFFICE VISIT (OUTPATIENT)
Dept: INTERNAL MEDICINE | Facility: CLINIC | Age: 37
End: 2017-10-04
Payer: COMMERCIAL

## 2017-10-04 VITALS
SYSTOLIC BLOOD PRESSURE: 116 MMHG | BODY MASS INDEX: 21.19 KG/M2 | OXYGEN SATURATION: 99 % | HEIGHT: 65 IN | DIASTOLIC BLOOD PRESSURE: 82 MMHG | HEART RATE: 77 BPM | WEIGHT: 127.19 LBS

## 2017-10-04 DIAGNOSIS — D18.03 HEMANGIOMA OF LIVER: ICD-10-CM

## 2017-10-04 DIAGNOSIS — R07.81 RIB PAIN ON LEFT SIDE: ICD-10-CM

## 2017-10-04 DIAGNOSIS — R07.81 RIB PAIN ON LEFT SIDE: Primary | ICD-10-CM

## 2017-10-04 PROCEDURE — 71100 X-RAY EXAM RIBS UNI 2 VIEWS: CPT | Mod: TC

## 2017-10-04 PROCEDURE — 99999 PR PBB SHADOW E&M-EST. PATIENT-LVL IV: CPT | Mod: PBBFAC,,, | Performed by: PHYSICIAN ASSISTANT

## 2017-10-04 PROCEDURE — 99213 OFFICE O/P EST LOW 20 MIN: CPT | Mod: S$GLB,,, | Performed by: PHYSICIAN ASSISTANT

## 2017-10-04 PROCEDURE — 71100 X-RAY EXAM RIBS UNI 2 VIEWS: CPT | Mod: 26,,, | Performed by: RADIOLOGY

## 2017-10-04 NOTE — PATIENT INSTRUCTIONS
Rib Contusion or Minor Fracture    A rib contusion is a bruise to one or more rib bones. It may cause pain, tenderness, swelling, and a purplish tint to the skin. There may be a sharp pain with each breath. A rib contusion takes anywhere from a few days to a few weeks to heal. A minor rib fracture or break may cause the same symptoms as a rib contusion. The small crack may not be seen on a regular chest X-ray. Treatment for both problems is the same.  Home care  · You may use over-the-counter pain medicine to control pain, unless another pain medicine was prescribed. If you have chronic liver or kidney disease or ever had a stomach ulcer or GI bleeding, talk with your healthcare provider before using these medicines.  · Rest. Do not lift anything heavy or do any activity that causes pain.  · Apply an ice pack over the injured area for 15 to 20 minutes every 1 to 2 hours. You should do this for the first 24 to 48 hours. You can make an ice pack by filling a plastic bag that seals at the top with ice cubes and then wrapping it with a thin towel. Continue with ice packs as needed for the relief of pain and swelling.  · The first 3 to 4 weeks of healing will be the most painful. If your pain is not under control with the treatment given, call your healthcare provider. Sometimes a stronger pain medicine may be needed. A nerve block can be done in case of severe pain. It will numb the nerve between the ribs.  Follow-up care  Follow up with your healthcare provider, or as advised.  If X-rays were taken, you will be told of any new findings that may affect your care.  Call 911  Call 911 if you have:  · Dizziness, weakness or fainting  · Shortness of breath with or without chest discomfort  · New or worsening pain  When to seek medical advice  Call your healthcare provider right away if any of these occur:  · Fever of 100.4°F (38°C) or above lasting for 24 to 48 hours  · Stomach pain  Date Last Reviewed: 12/2/2015  ©  7305-5097 [employer]. [address] [place] [address], [place] [address]. All rights reserved. This information is not intended as a substitute for professional medical care. Always follow your healthcare professional's instructions.

## 2017-10-04 NOTE — PROGRESS NOTES
Subjective:       Patient ID: Georgie Wetzel is a 37 y.o. female.        Chief Complaint: Fall (Saturday)    Georgie Wetzel is an established patient of [facility] here today for urgent care visit.    9/30/17-sitting on her boyfriend's shoulders and fell off.  He caught her somewhat but she hit her left ribs on the ground.  She did not hit her head and there was no loss of consciousness.  She felt okay after the fall but the next day she noticed some soreness in the left ribs upon palpation.  She did not note any visible bruising.  No shortness of breath.      Saw hepatology for f/u of hemangioma-no further work up or evaluation needed.    S/p colonoscopy as well that was acceptable.    Overall she is feeling well.           Review of Systems   Constitutional: Negative for chills, diaphoresis, fatigue and fever.   HENT: Negative for congestion and sore throat.    Eyes: Negative for visual disturbance.   Respiratory: Negative for cough, chest tightness and shortness of breath.         Left rib pain s/p fall   Cardiovascular: Negative for chest pain, palpitations and leg swelling.   Gastrointestinal: Negative for abdominal pain, blood in stool, constipation, diarrhea, nausea and vomiting.   Genitourinary: Negative for dysuria, frequency, hematuria and urgency.   Musculoskeletal: Negative for arthralgias and back pain.   Skin: Negative for rash.   Neurological: Negative for dizziness, syncope, weakness and headaches.   Psychiatric/Behavioral: Negative for dysphoric mood and sleep disturbance. The patient is not nervous/anxious.        Objective:      Physical Exam   Constitutional: She appears well-developed and well-nourished. No distress.   HENT:   Head: Normocephalic and atraumatic.   Right Ear: Tympanic membrane and external ear normal.   Left Ear: Tympanic membrane and external ear normal.   Nose: Nose normal.   Mouth/Throat: Oropharynx is clear and moist.   Eyes: Conjunctivae are normal. Pupils are  "equal, round, and reactive to light.   Cardiovascular: Normal rate, regular rhythm and normal heart sounds.  Exam reveals no gallop.    No murmur heard.  Pulmonary/Chest: Effort normal and breath sounds normal. No respiratory distress.       Abdominal: Soft. Normal appearance. There is no tenderness. There is no rebound, no guarding and no CVA tenderness.   Musculoskeletal: She exhibits no edema.   Neurological: She is alert.   Skin: Skin is warm and dry. She is not diaphoretic.   Psychiatric: She has a normal mood and affect.   Nursing note and vitals reviewed.      Assessment:       1. Rib pain on left side    2. Hemangioma of liver        Plan:       Georgie was seen today for fall.    Diagnoses and all orders for this visit:    Rib pain on left side-rest, ice, avoid aggravating activities.  -     X-Ray Ribs 2 View Left; Future    Hemangioma of liver-no further work up or evaluation needed s/p hepatology visit.    Pt has been given instructions populated from [employer] database and has verbalized understanding of the after visit summary and information contained wherein.    Follow up with a primary care provider. May go to ER for acute shortness of breath, lightheadedness, fever, or any other emergent complaints or changes in condition.    "This note will be shared with the patient"    Future Appointments  Date Time Provider Department Center   10/4/2017 9:30 AM [facility] [id number] LB LIMIT [facility] XRAY IM Jose Xiong PCW   10/17/2017 1:30 PM Thom Evans MD [facility] [id number]               "

## 2017-10-17 ENCOUNTER — OFFICE VISIT (OUTPATIENT)
Dept: OBSTETRICS AND GYNECOLOGY | Facility: CLINIC | Age: 37
End: 2017-10-17
Attending: OBSTETRICS & GYNECOLOGY
Payer: COMMERCIAL

## 2017-10-17 VITALS
DIASTOLIC BLOOD PRESSURE: 68 MMHG | BODY MASS INDEX: 21.12 KG/M2 | WEIGHT: 126.75 LBS | HEIGHT: 65 IN | SYSTOLIC BLOOD PRESSURE: 106 MMHG

## 2017-10-17 DIAGNOSIS — N92.6 ABNORMAL MENSTRUAL PERIODS: Primary | ICD-10-CM

## 2017-10-17 DIAGNOSIS — N76.0 VULVOVAGINITIS: ICD-10-CM

## 2017-10-17 DIAGNOSIS — R39.14 FEELING OF INCOMPLETE BLADDER EMPTYING: ICD-10-CM

## 2017-10-17 LAB
CANDIDA RRNA VAG QL PROBE: POSITIVE
G VAGINALIS RRNA GENITAL QL PROBE: NEGATIVE
T VAGINALIS RRNA GENITAL QL PROBE: NEGATIVE

## 2017-10-17 PROCEDURE — 99214 OFFICE O/P EST MOD 30 MIN: CPT | Mod: S$GLB,,, | Performed by: OBSTETRICS & GYNECOLOGY

## 2017-10-17 PROCEDURE — 99999 PR PBB SHADOW E&M-EST. PATIENT-LVL II: CPT | Mod: PBBFAC,,, | Performed by: OBSTETRICS & GYNECOLOGY

## 2017-10-17 PROCEDURE — 87660 TRICHOMONAS VAGIN DIR PROBE: CPT

## 2017-10-17 PROCEDURE — 87480 CANDIDA DNA DIR PROBE: CPT

## 2017-10-17 PROCEDURE — 87086 URINE CULTURE/COLONY COUNT: CPT

## 2017-10-17 PROCEDURE — 87591 N.GONORRHOEAE DNA AMP PROB: CPT

## 2017-10-17 NOTE — PROGRESS NOTES
Chief Complaint   Patient presents with    Vulvar Itch    Urinary Tract Infection    Dysmenorrhea    Vaginal Bleeding       HPI:  Georgie Wetzel is a 37 y.o. female patient  who presents today for evaluation of several GYN issues:  1) She is currently on Microgestin Fe with monthly bleeding of dark bleeding lasting several days in duration.  No breakthrough bleeding.  Her LMP began as expected but has lasted longer in duration than usual.  Also, she notes mild nausea, fatigue, and tender breasts.   2) She describes having vulva itching for which she tried Vagisil with minimal improvement.  Denies vaginal itching or abnormal discharge.  3) Also, she notes some difficulty completely emptying her bladder.  No urgency, frequency, or dysuria.  Patient's last menstrual period was 2017 (approximate).     UPT: Negative  UA dip: Negative    Past Medical History:   Diagnosis Date    Acne     Depression     treated with medication last use , situational     GERD (gastroesophageal reflux disease)     High cholesterol     Retinal detachment        Past Surgical History:   Procedure Laterality Date     Retinal Tear OS        COLONOSCOPY N/A 2017    Procedure: COLONOSCOPY;  Surgeon: Vahid Lynn MD;  Location: [address];  Service: Endoscopy;  Laterality: N/A;  after Aug 13 for hx of diverticulitis    CRYOTHERAPY      RETINAL DETACHMENT SURGERY      RPK  OU [address]    2008    TONSILLECTOMY, ADENOIDECTOMY           ROS:  GENERAL: Reports fatigue..   SKIN: Denies rash or lesions.   HEAD: Denies head injury or headache.   NODES: Denies enlarged lymph nodes.   CHEST: Denies chest pain or shortness of breath.   CARDIOVASCULAR: Denies palpitations or left sided chest pain.   ABDOMEN: No abdominal pain.  Reports nausea.   URINARY: Reports some difficulty completely emptying bladder.  REPRODUCTIVE: See HPI.   BREASTS: Denies pain, lumps, or nipple discharge.   HEMATOLOGIC: No easy  bruisability or excessive bleeding.   MUSCULOSKELETAL: Denies joint pain or swelling.   NEUROLOGIC: Denies syncope or weakness.   PSYCHIATRIC: Denies depression.    PE:   (chaperone present during entire exam)  APPEARANCE: Well nourished, well developed, in no acute distress.  ABDOMEN: Soft. No tenderness or masses.   VULVA: No lesions. Normal female genitalia.   URETHRAL MEATUS: Normal size and location, no lesions, no prolapse.  URETHRA: No masses, tenderness, prolapse or scarring.  VAGINA: Moist and well rugated, old dark blood in vault.  CERVIX: No lesions and discharge. No CMT.  UTERUS: Normal size, regular shape, mobile, non-tender, bladder base nontender.  ADNEXA: No masses, tenderness or CDS nodularity.  ANUS PERINEUM: Normal.    Diagnosis:  1. Abnormal menstrual periods    2. Vulvovaginitis    3. Feeling of incomplete bladder emptying          PLAN:    Orders Placed This Encounter    C. trachomatis/N. gonorrhoeae by AMP DNA Cervix    VAGINOSIS SCREEN BY DNA PROBE    Urine culture    POCT Urine Pregnancy       Patient was counseled today on her current period that is lasting longer than normal.  UPT today was negative.  GC/CT was performed to rule out an infectious etiology.  She will continue OCPs and let us know if she continues to have abnormal bleeding.  We also discussed vulvovaginitis: etiologies, diagnosis, and treatment.  We will contact her in several days for results of the Affirm and GC/CT.  Urine dip today was negative- we will check a urine culture.      Follow-up for annual exam.

## 2017-10-18 ENCOUNTER — TELEPHONE (OUTPATIENT)
Dept: OBSTETRICS AND GYNECOLOGY | Facility: CLINIC | Age: 37
End: 2017-10-18

## 2017-10-18 LAB
BACTERIA UR CULT: NORMAL
C TRACH DNA SPEC QL NAA+PROBE: NOT DETECTED
N GONORRHOEA DNA SPEC QL NAA+PROBE: NOT DETECTED

## 2017-10-18 RX ORDER — FLUCONAZOLE 150 MG/1
150 TABLET ORAL ONCE
Qty: 1 TABLET | Refills: 1 | Status: SHIPPED | OUTPATIENT
Start: 2017-10-18 | End: 2017-10-18

## 2017-10-18 NOTE — TELEPHONE ENCOUNTER
Called patient:    Discussed results of Affirm: +yeast    Rx: Diflucan 150 mg     To let us know if not resolved.

## 2017-10-18 NOTE — TELEPHONE ENCOUNTER
----- Message from Amanda Yang sent at 10/18/2017  8:22 AM CDT -----  Contact: self  Pt returning a missed call, she can be reached at [phone].

## 2017-10-19 ENCOUNTER — PATIENT MESSAGE (OUTPATIENT)
Dept: OBSTETRICS AND GYNECOLOGY | Facility: CLINIC | Age: 37
End: 2017-10-19

## 2017-10-23 ENCOUNTER — OFFICE VISIT (OUTPATIENT)
Dept: NEUROLOGY | Facility: CLINIC | Age: 37
End: 2017-10-23
Payer: COMMERCIAL

## 2017-10-23 VITALS
DIASTOLIC BLOOD PRESSURE: 83 MMHG | HEIGHT: 65 IN | WEIGHT: 129.19 LBS | HEART RATE: 71 BPM | SYSTOLIC BLOOD PRESSURE: 143 MMHG | BODY MASS INDEX: 21.52 KG/M2

## 2017-10-23 DIAGNOSIS — G47.9 TROUBLE IN SLEEPING: ICD-10-CM

## 2017-10-23 DIAGNOSIS — R51.9 WORSENING HEADACHES: ICD-10-CM

## 2017-10-23 DIAGNOSIS — F41.9 ANXIETY: ICD-10-CM

## 2017-10-23 DIAGNOSIS — R51.9 NONINTRACTABLE EPISODIC HEADACHE, UNSPECIFIED HEADACHE TYPE: ICD-10-CM

## 2017-10-23 DIAGNOSIS — F41.0 PANIC ATTACKS: ICD-10-CM

## 2017-10-23 DIAGNOSIS — R53.83 FATIGUE, UNSPECIFIED TYPE: ICD-10-CM

## 2017-10-23 DIAGNOSIS — E55.9 VITAMIN D DEFICIENCY: ICD-10-CM

## 2017-10-23 DIAGNOSIS — M79.18 CERVICAL MYOFASCIAL PAIN SYNDROME: ICD-10-CM

## 2017-10-23 DIAGNOSIS — G43.109 MIGRAINE WITH AURA AND WITHOUT STATUS MIGRAINOSUS, NOT INTRACTABLE: Primary | ICD-10-CM

## 2017-10-23 PROCEDURE — 99205 OFFICE O/P NEW HI 60 MIN: CPT | Mod: S$GLB,,, | Performed by: NURSE PRACTITIONER

## 2017-10-23 PROCEDURE — 99999 PR PBB SHADOW E&M-EST. PATIENT-LVL III: CPT | Mod: PBBFAC,,, | Performed by: NURSE PRACTITIONER

## 2017-10-23 RX ORDER — TIZANIDINE 2 MG/1
TABLET ORAL
Qty: 60 TABLET | Refills: 2 | Status: SHIPPED | OUTPATIENT
Start: 2017-10-23 | End: 2017-12-06 | Stop reason: SDUPTHER

## 2017-10-23 RX ORDER — DICLOFENAC SODIUM 75 MG/1
TABLET, DELAYED RELEASE ORAL
Qty: 40 TABLET | Refills: 2 | Status: SHIPPED | OUTPATIENT
Start: 2017-10-23 | End: 2017-12-06 | Stop reason: SDUPTHER

## 2017-10-23 NOTE — PROGRESS NOTES
"SUBJECTIVE:  Patient ID: Georgie Wetzel   MRN: [id number]  Referred By: Aaarefstone Self    Chief Complaint: Consult    History of Present Illness:   37 y.o. female with history of migraines, GERD, anxiety, depression, HLD, and trouble sleeping, who presents to clinic alone for evaluation of headaches. Terrible anxiety (worse over the last few months)     Migraines initially began when she was in 3rd grade, at the time she would experience loss of vision on the right side would vomit and then her headaches would go away, this type of headache ended when she was 15 years old.  From when she was 15 until last October, she would still experience migraines on average 1-2 per month, more so when she was very stressed.  Last October she experienced another migraine with aura (described above), at the time she was self-administering a lot of estrogen supplements.  She immediately stopped the estrogen supplements and restarted low-dose birth control in December 2016 and has not experienced another migraine with aura since.  Between December 2016 to last week she would get a migraine once every other month, she would treat her migraines with excedrin migraine and it would go away every single time. Two weeks ago (October 11), she was about to fly back from [facility] and while she was standing in the security line, she got an intense smell of gasoline, that evening she began to experience nausea and just started to "feel bad".  Beginning Thursday morning, the same day she started her menstrual cycle, her head felt sore all day, offers the headache never became a full blown migraine, just felt like soreness all over.  Headache lasted until last Thursday, stated she decided she needed to stop working out so hard and decided to go get a full body massage which made her feel much better, and Friday she felt back to normal.  She has experienced a headache similar to this in the past, this is not the worst headache of her life. " " She has tried using peppermint oil on her forehead/cheeks which does help the pain.  Historically, headaches are always worse/more frequent when she is under increased stress.  She changed jobs over the past few months and feels like she has nothing to do at work so she has been staring at her iphone or ipad watching netflix for numerous hours, more so in the last few weeks than she ever has.  She does feel staring at her phone/ipad is a trigger.  She does offer she has been experiencing "terrible anxiety", has always suffered from anxiety, however over the past few months it has been significantly worse.  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  Her boyfriend moved back to [place] and thus she does not see him regularly which also causes her worsening stress because she feels he is her support system.  This is also the first relationship she has had since her divorce a few years ago.  Also causing herself worsening stress with regards to her headaches, states she is always assuming the worst, mother  from ovarian cancer in  and anytime she gets a symptom she convinces herself she has cancer or something really bad, which also worsens her headaches.  She saw mental health following the death of her mother, but has not seen mental health since then, does offer she thinks seeing a therapist or psychiatrist would be beneficial for her.   Associated Symptoms - nausea (subsides when she "de-stresses"), sensitive to her ipad and iphone (photophobia),   Triggers - stress, staring at screens   Aggravating Factors - if she went to spin class   Alleviating Factors - Advil migraine   Head Trauma? Infection? Fever? Cancer? Pregnancy? <-- denies "   Recent Changes - change in job - started working with [employer] in February and things have been awful, used to be on the [place] [employer], she was the vice-chair and she fell in love with the president and has been accused of sleeping with the president for gain, people hired private investigators to follow them, then began writing on a blog about their relationship   Sleep - she is not sleeping well, states she is exhausted all the time, trouble falling asleep and staying asleep    Family Hx of Migraines (mom)  Positives in bold: Hx of Kidney Stones, asthma, GI bleed, osteoporosis, CAD/MI, CVA/TIA, DM <-- denies     Treatments Tried and Response  Excedrin migraine - used to work   Ibuprofen migraine - works within 20 minutes     Social History  Alcohol - 2-3 michelob ultras every other night, then about a bottle of wine nightly on the weekends   Smoke - denies   Recreational Drug Use- denies     Current Medications:    ACZONE 5 % topical gel, APPLY A THIN FILM TO FACE IN THE MORNING, Disp: , Rfl: 6    LACTOBACILLUS ACIDOPHILUS (ACIDOPHILUS ORAL), Take by mouth., Disp: , Rfl:     norethindrone-ethinyl estradiol (MICROGESTIN 1/20) 1-20 mg-mcg per tablet, TAKE 1 TABLET BY MOUTH ONCE DAILY., Disp: 21 tablet, Rfl: 6    omeprazole (PRILOSEC OTC) 20 MG tablet, Take 40 mg by mouth once daily. , Disp: , Rfl:     spironolactone (ALDACTONE) 50 MG tablet, Take 100 mg by mouth once daily., Disp: , Rfl: 3    SS 10-2 10-2 % Clsr, USE TO WASH FACE EVERY DAY, Disp: , Rfl: 6    ZIANA gel, APPLY A THIN FILM TO FACE AT BEDTIME, Disp: , Rfl: 6    diclofenac (VOLTAREN) 75 MG EC tablet, Take 1 tab by mouth every 8-10 hours as needed for headaches. No more than 2 tabs/day, no more than 3 days/week., Disp: 40 tablet, Rfl: 2    tizanidine (ZANAFLEX) 2 MG tablet, 1-2 tabs around bedtime (muscle relaxer).  No driving with this medication. No alcohol with med., Disp: 60 tablet, Rfl: 2    Review of Systems - as per HPI,  "otherwise a 10 system balanced review is negative     OBJECTIVE:  Vitals:  BP (!) 143/83   Pulse 71   Ht 5' 5" (1.651 m)   Wt 58.6 kg (129 lb 3 oz)   LMP 09/18/2017 (Approximate)   BMI 21.50 kg/m²     Physical Exam   Constitutional: She appears well-developed and well-nourished. She is well groomed. Tearful throughout visit while discussing the stressors she is currently under  HENT:    Head: Normocephalic and atraumatic, oral and nasal mucosa intact.  Frontalis was NTTP, temporalis was NTTP   Eyes: Conjunctivae and EOM are normal. Pupils are equal, round, and reactive to light   Neck: Neck supple. No carotid bruit heard bilaterally.Occiput NTTP bilaterally, Trapezius is tight bilaterally and mildly TTP   Cardiovascular: Normal rate and normal heart sounds. No murmur heard.  Pulmonary/Chest: Effort normal and breath sounds normal  Musculoskeletal: Normal range of motion. No joint stiffness. No vertebral point tenderness.  Skin: Skin is warm and dry.  Psychiatric: Mood and affect are anxious      Neuro exam:    Mental status:  The patient is awake, alert, and oriented to person, place and time.  Language is intact and fluent  Remote and recent memory are intact  Normal attention and concentration  Mood is anxious    Cranial Nerves:  Fundoscopic examination does not reveal any occult papilledema.    Pupils are equal and reactive to light.    Extraocular movements are intact and without nystagmus.    Visual fields are full to confrontation testing.   Facial movement is symmetric.  Facial sensation is intact.    Hearing is intact to finger rub   Uvula in midline. Tongue in midline without fasciculation.   FROM of neck in all (6) directions.  Shoulder shrug symmetrical.    Coordination:     Finger to nose - normal and symmetric bilaterally   Heel to shin test - normal and symmetric bilaterally     Motor:  Normal muscle bulk and symmetry. No fasciculations were noted.   Tremor not apparent   Pronator drift not " apparent.    strength was strong and symmetric   Finger extension strength was strong and symmetric   RUE:appropriate against gravity and medium force as tested 5/5  LUE: appropriate against gravity and medium force as tested 5/5  RLE:appropriate against gravity and medium force as tested 5/5              LLE: appropriate against gravity and medium force as tested 5/5    Reflexes:  Right Brachioradialis 3+  Left Brachioradialis 3+  Right Biceps 3+  Left Biceps 3+  Right Patellar2+  Left Patellar 2+  Right Achilles 2+  Left Achilles 2+                          Plantar flexion bilat   Arana was negative bilaterally      Sensory:  RUE  intact light touch and temperature  LUE intact light touch and temperature    RLE intact light touch  LLE intact light touch    Gait:   Romberg - negative   Normal gait  Tandem, Heel, and Toe Walk - able to perform without difficulty     Review of Data:   Notes from ED visit in 8/25/2017,   Labs:  Office Visit on 10/17/2017   Component Date Value Ref Range Status    Chlamydia, Amplified DNA 10/18/2017 Not Detected   Final    N gonorrhoeae, amplified DNA 10/18/2017 Not Detected   Final    Trichomonas vaginalis 10/17/2017 Negative  Negative Final    Gardnerella vaginalis 10/17/2017 Negative  Negative Final    Candida sp 10/17/2017 Positive* Negative Final    Urine Culture, Routine 10/18/2017 No significant growth   Final   Admission on 08/25/2017, Discharged on 08/25/2017   Component Date Value Ref Range Status    POC Preg Test, Ur 08/25/2017 Negative  Negative Final     Acceptable 08/25/2017 Yes   Final       Imaging:    Note: I have independently reviewed any/all imaging/labs/tests and agree with the report (s) as documented.  Any discrepancies will be as noted/demarcated by free text.  JOLYNN AHN 10/23/2017    ASSESSMENT:  1. Migraine with aura and without status migrainosus, not intractable    2. Worsening headaches    3. Anxiety    4. Trouble in sleeping     5. Cervical myofascial pain syndrome    6. Panic attacks    7. Nonintractable episodic headache, unspecified headache type    8. Vitamin D deficiency    9. Fatigue, unspecified type      PLAN:  - Discussed I do not feel imaging is needed at this time, but offered if it will give her piece of mind, I will be happy to order an MRI of the brain, she defers at this time   - For headache prevention and also to help with sleep - start Zanaflex 2 mg    Take 1-2 tabs around bedtime, no alcohol or driving with this medication  - Discussed alternative preventive options including elavil, AED, however she does not like the idea of taking seizure medication and does not want to take anything that will cause her to gain weight - will defer anti-depressant to psychiatry   - For acute headaches - given Diclofenac 75 mg tabs    Take 1 tab every 8-10 hours as needed for headaches, no more than 2 tabs/day or 3 days per week   - Referral placed to psychiatry   - Labs ordered - TSH, FT4, Vitamin D   - Start tracking headaches via migraine buddy lobo on phone   - Work on lifestyle modifications such as good sleep hygiene, stress reduction techniques, healthy diet, regular exercise   - RTC in 6 weeks or sooner if needed     Orders Placed This Encounter    TSH    T4, FREE    VITAMIN D    Ambulatory Referral to Psychiatry    tizanidine (ZANAFLEX) 2 MG tablet    diclofenac (VOLTAREN) 75 MG EC tablet     I have discussed realistic goals of care with patient at length as well as medication options, and need for lifestyle adjustment. I have explained that treatment will take time. We have agreed that the goal will be to reduce frequency/intensity/quantity of HA, not to be completely HA free. I have explained my non narcotic policy regarding headache treatment.    Patient to track frequency of headaches.  Patient agreeable to work on lifestyle adjustments.    I spent 60 minutes face-to-face with the patient with >50% of the time spent  with counseling and education regarding:  - results of data, diagnosis, and recommendations stated above  - risks, benefits, and potential side effects of zanaflex and diclofenac discussed   - alternative treatment options including amitriptyline, topamax, gabapentin offered   - importance of healthy diet, regular exercise and sleep hygiene in the treatment of headaches      All questions and concerns addressed.  Patient verbalizes understanding and is agreeable to the plan.       Rosa Parikh, MARSHALLP-C  [facility]  [phone]    Dr. Ramírez was available during today's encounter.

## 2017-11-08 ENCOUNTER — OFFICE VISIT (OUTPATIENT)
Dept: URGENT CARE | Facility: CLINIC | Age: 37
End: 2017-11-08
Payer: COMMERCIAL

## 2017-11-08 VITALS
OXYGEN SATURATION: 100 % | HEIGHT: 65 IN | DIASTOLIC BLOOD PRESSURE: 77 MMHG | RESPIRATION RATE: 18 BRPM | SYSTOLIC BLOOD PRESSURE: 121 MMHG | WEIGHT: 125 LBS | TEMPERATURE: 98 F | BODY MASS INDEX: 20.83 KG/M2 | HEART RATE: 72 BPM

## 2017-11-08 DIAGNOSIS — R05.9 COUGH: Primary | ICD-10-CM

## 2017-11-08 DIAGNOSIS — J06.9 UPPER RESPIRATORY TRACT INFECTION, UNSPECIFIED TYPE: ICD-10-CM

## 2017-11-08 LAB
CTP QC/QA: YES
FLUAV AG NPH QL: NEGATIVE
FLUBV AG NPH QL: NEGATIVE

## 2017-11-08 PROCEDURE — 87804 INFLUENZA ASSAY W/OPTIC: CPT | Mod: 59,QW,S$GLB, | Performed by: EMERGENCY MEDICINE

## 2017-11-08 PROCEDURE — 99214 OFFICE O/P EST MOD 30 MIN: CPT | Mod: S$GLB,,, | Performed by: EMERGENCY MEDICINE

## 2017-11-08 RX ORDER — CODEINE PHOSPHATE AND GUAIFENESIN 10; 100 MG/5ML; MG/5ML
10 SOLUTION ORAL EVERY 6 HOURS PRN
Qty: 120 ML | Refills: 0 | Status: SHIPPED | OUTPATIENT
Start: 2017-11-08 | End: 2017-11-18

## 2017-11-08 RX ORDER — BENZONATATE 200 MG/1
200 CAPSULE ORAL 3 TIMES DAILY PRN
Qty: 30 CAPSULE | Refills: 0 | Status: SHIPPED | OUTPATIENT
Start: 2017-11-08 | End: 2017-11-18

## 2017-11-08 NOTE — PROGRESS NOTES
"Subjective:       Patient ID: Georgie Wetzel is a 37 y.o. female.    Vitals:  height is 5' 5" (1.651 m) and weight is 56.7 kg (125 lb). Her oral temperature is 98.2 °F (36.8 °C). Her blood pressure is 121/77 and her pulse is 72. Her respiration is 18 and oxygen saturation is 100%.     Chief Complaint: Cough    Sick last night with temp 100 and cough and unable to breath through nose. Her boyfriend has a sinus infection      Cough   This is a new problem. The current episode started yesterday. The problem has been gradually worsening. The problem occurs constantly. The cough is productive of sputum. Associated symptoms include chills, a fever (to 100 last night), headaches, myalgias, nasal congestion and postnasal drip. Pertinent negatives include no chest pain, ear pain, eye redness, sore throat, shortness of breath or wheezing. The symptoms are aggravated by lying down. Treatments tried: mucinex. The treatment provided mild relief. Her past medical history is significant for bronchitis and pneumonia. There is no history of asthma.     Review of Systems   Constitution: Positive for chills, decreased appetite, fever (to 100 last night), weakness and malaise/fatigue.   HENT: Positive for congestion and postnasal drip. Negative for ear pain, hoarse voice and sore throat.    Eyes: Negative for discharge and redness.   Cardiovascular: Negative for chest pain, dyspnea on exertion and leg swelling.   Respiratory: Positive for cough and sputum production (white phlegm in am). Negative for shortness of breath and wheezing.    Musculoskeletal: Positive for myalgias. Negative for back pain.   Gastrointestinal: Negative for abdominal pain and nausea.   Neurological: Positive for headaches.       Objective:      Physical Exam   Constitutional: She is oriented to person, place, and time. She appears well-developed and well-nourished. She is cooperative.  Non-toxic appearance. She appears ill. No distress.   HENT:   Head: " Normocephalic and atraumatic.   Right Ear: Hearing, tympanic membrane, external ear and ear canal normal.   Left Ear: Hearing, tympanic membrane, external ear and ear canal normal.   Nose: Mucosal edema and rhinorrhea present. No nasal deformity. No epistaxis. Right sinus exhibits no maxillary sinus tenderness and no frontal sinus tenderness. Left sinus exhibits no maxillary sinus tenderness and no frontal sinus tenderness.   Mouth/Throat: Uvula is midline and mucous membranes are normal. No trismus in the jaw. Normal dentition. No uvula swelling. Posterior oropharyngeal erythema present.   Ceruminosis bilaterally   Eyes: Conjunctivae, EOM and lids are normal. Pupils are equal, round, and reactive to light. No scleral icterus.   Sclera clear bilat   Neck: Trachea normal, normal range of motion, full passive range of motion without pain and phonation normal. Neck supple.   Cardiovascular: Normal rate, regular rhythm, normal heart sounds, intact distal pulses and normal pulses.    Pulmonary/Chest: Effort normal and breath sounds normal. No respiratory distress.   Abdominal: Soft. Normal appearance and bowel sounds are normal. She exhibits no distension. There is no tenderness.   Musculoskeletal: Normal range of motion. She exhibits no edema or deformity.   Neurological: She is alert and oriented to person, place, and time. She exhibits normal muscle tone. Coordination normal.   Skin: Skin is warm, dry and intact. She is not diaphoretic. No pallor.   Psychiatric: She has a normal mood and affect. Her speech is normal and behavior is normal. Judgment and thought content normal. Cognition and memory are normal.   Nursing note and vitals reviewed.      Office Visit on 11/08/2017   Component Date Value Ref Range Status    Rapid Influenza A Ag 11/08/2017 Negative  Negative Final    Rapid Influenza B Ag 11/08/2017 Negative  Negative Final     Acceptable 11/08/2017 Yes   Final     Assessment:       1. Cough     2. Upper respiratory tract infection, unspecified type        Plan:         Cough  -     POCT Influenza A/B    Upper respiratory tract infection, unspecified type    Other orders  -     benzonatate (TESSALON) 200 MG capsule; Take 1 capsule (200 mg total) by mouth 3 (three) times daily as needed for Cough.  Dispense: 30 capsule; Refill: 0  -     guaifenesin-codeine 100-10 mg/5 ml (TUSSI-ORGANIDIN NR)  mg/5 mL syrup; Take 10 mLs by mouth every 6 (six) hours as needed for Cough.  Dispense: 120 mL; Refill: 0

## 2017-11-21 ENCOUNTER — OFFICE VISIT (OUTPATIENT)
Dept: OBSTETRICS AND GYNECOLOGY | Facility: CLINIC | Age: 37
End: 2017-11-21
Attending: OBSTETRICS & GYNECOLOGY
Payer: COMMERCIAL

## 2017-11-21 VITALS
DIASTOLIC BLOOD PRESSURE: 70 MMHG | BODY MASS INDEX: 21.23 KG/M2 | HEIGHT: 65 IN | SYSTOLIC BLOOD PRESSURE: 110 MMHG | WEIGHT: 127.44 LBS

## 2017-11-21 DIAGNOSIS — Z01.419 WELL WOMAN EXAM WITH ROUTINE GYNECOLOGICAL EXAM: Primary | ICD-10-CM

## 2017-11-21 DIAGNOSIS — Z30.41 ENCOUNTER FOR SURVEILLANCE OF CONTRACEPTIVE PILLS: ICD-10-CM

## 2017-11-21 DIAGNOSIS — Z12.4 PAP SMEAR FOR CERVICAL CANCER SCREENING: ICD-10-CM

## 2017-11-21 PROCEDURE — 99395 PREV VISIT EST AGE 18-39: CPT | Mod: S$GLB,,, | Performed by: OBSTETRICS & GYNECOLOGY

## 2017-11-21 PROCEDURE — 99999 PR PBB SHADOW E&M-EST. PATIENT-LVL III: CPT | Mod: PBBFAC,,, | Performed by: OBSTETRICS & GYNECOLOGY

## 2017-11-21 PROCEDURE — 88142 CYTOPATH C/V THIN LAYER: CPT

## 2017-11-21 RX ORDER — NORETHINDRONE ACETATE AND ETHINYL ESTRADIOL .02; 1 MG/1; MG/1
1 TABLET ORAL DAILY
Qty: 21 TABLET | Refills: 12 | Status: SHIPPED | OUTPATIENT
Start: 2017-11-21 | End: 2018-10-22 | Stop reason: SDUPTHER

## 2017-11-21 NOTE — PROGRESS NOTES
Georgie Wetzel is 37 y.o. female  who presents today for annual exam.  She is currently on Microgestin  without any problems.  Menses occur monthly without breakthrough bleeding.  Recently treated for yeast with Diflucan - symptoms have resolved.  Denies recent changes in her medical / surgical history.  No GYN complaints.  Patient's last menstrual period was 2017 (approximate).    Past Medical History:   Diagnosis Date    Acne     Depression     treated with medication last use , situational     GERD (gastroesophageal reflux disease)     High cholesterol     Retinal detachment        Past Surgical History:   Procedure Laterality Date     Retinal Tear OS        COLONOSCOPY N/A 2017    Procedure: COLONOSCOPY;  Surgeon: Vahid Lynn MD;  Location: [facility] ([address] [place] [address]);  Service: Endoscopy;  Laterality: N/A;  after Aug 13 for hx of diverticulitis    CRYOTHERAPY      RETINAL DETACHMENT SURGERY      RPK  OU Dr Munoz    2008    TONSILLECTOMY, ADENOIDECTOMY           ROS:  GENERAL: Feeling well overall.   SKIN: Denies rash or lesions.   HEAD: Denies head injury or headache.   NODES: Denies enlarged lymph nodes.   CHEST: Denies chest pain or shortness of breath.   CARDIOVASCULAR: Denies palpitations or left sided chest pain.   ABDOMEN: No abdominal pain, nausea, vomiting or rectal bleeding.   URINARY: No dysuria or hematuria.  REPRODUCTIVE: See HPI.   BREASTS: Denies pain, lumps, or nipple discharge.   HEMATOLOGIC: No easy bruisability or excessive bleeding.   MUSCULOSKELETAL: Denies joint pain or swelling.   NEUROLOGIC: Denies syncope or weakness.   PSYCHIATRIC: Denies depression.    PE:   (chaperone present during entire exam)  APPEARANCE: Well nourished, well developed, in no acute distress.  BREASTS: Symmetrical, no skin changes or visible lesions. No palpable masses, nipple discharge or adenopathy bilaterally.  ABDOMEN: Soft. No tenderness or masses. No hernias. No CVA  tenderness.  VULVA: No lesions. Normal female genitalia.  URETHRAL MEATUS: Normal size and location, no lesions, no prolapse.  URETHRA: No masses, tenderness, prolapse or scarring.  VAGINA: Moist and well rugated, no discharge, no significant cystocele or rectocele.  CERVIX: No lesions and discharge. PAP done.  UTERUS: Normal size, regular shape, mobile, non-tender, bladder base nontender.  ADNEXA: No masses, tenderness or CDS nodularity.  ANUS PERINEUM: Normal.      Diagnosis:  1. Well woman exam with routine gynecological exam    2. Encounter for surveillance of contraceptive pills    3. Pap smear for cervical cancer screening          PLAN:    Orders Placed This Encounter    Liquid-based pap smear, screening    norethindrone-ethinyl estradiol (MICROGESTIN 1/20) 1-20 mg-mcg per tablet         Patient was counseled today on the usage of OCPs: r/b.  She is doing well on Microgestin 1/20 and desires to continue.    Follow-up in 1 year.

## 2017-11-29 ENCOUNTER — PATIENT MESSAGE (OUTPATIENT)
Dept: OBSTETRICS AND GYNECOLOGY | Facility: CLINIC | Age: 37
End: 2017-11-29

## 2017-12-05 RX ORDER — NORETHINDRONE ACETATE AND ETHINYL ESTRADIOL .02; 1 MG/1; MG/1
TABLET ORAL
Qty: 21 TABLET | Refills: 6 | Status: SHIPPED | OUTPATIENT
Start: 2017-12-05 | End: 2017-12-06 | Stop reason: SDUPTHER

## 2017-12-06 ENCOUNTER — OFFICE VISIT (OUTPATIENT)
Dept: NEUROLOGY | Facility: CLINIC | Age: 37
End: 2017-12-06
Payer: COMMERCIAL

## 2017-12-06 ENCOUNTER — PATIENT MESSAGE (OUTPATIENT)
Dept: NEUROLOGY | Facility: CLINIC | Age: 37
End: 2017-12-06

## 2017-12-06 VITALS
WEIGHT: 130 LBS | BODY MASS INDEX: 21.66 KG/M2 | HEART RATE: 62 BPM | SYSTOLIC BLOOD PRESSURE: 105 MMHG | HEIGHT: 65 IN | DIASTOLIC BLOOD PRESSURE: 62 MMHG

## 2017-12-06 DIAGNOSIS — F41.9 ANXIETY: ICD-10-CM

## 2017-12-06 DIAGNOSIS — R51.9 NONINTRACTABLE EPISODIC HEADACHE, UNSPECIFIED HEADACHE TYPE: Primary | ICD-10-CM

## 2017-12-06 DIAGNOSIS — M79.18 MYOFASCIAL PAIN: ICD-10-CM

## 2017-12-06 DIAGNOSIS — G47.9 TROUBLE IN SLEEPING: ICD-10-CM

## 2017-12-06 DIAGNOSIS — G43.109 MIGRAINE WITH AURA AND WITHOUT STATUS MIGRAINOSUS, NOT INTRACTABLE: ICD-10-CM

## 2017-12-06 PROCEDURE — 99213 OFFICE O/P EST LOW 20 MIN: CPT | Mod: S$GLB,,, | Performed by: NURSE PRACTITIONER

## 2017-12-06 PROCEDURE — 99999 PR PBB SHADOW E&M-EST. PATIENT-LVL III: CPT | Mod: PBBFAC,,, | Performed by: NURSE PRACTITIONER

## 2017-12-06 RX ORDER — TIZANIDINE 2 MG/1
TABLET ORAL
Qty: 60 TABLET | Refills: 5 | Status: SHIPPED | OUTPATIENT
Start: 2017-12-06 | End: 2018-12-17 | Stop reason: SDUPTHER

## 2017-12-06 RX ORDER — DICLOFENAC SODIUM 75 MG/1
TABLET, DELAYED RELEASE ORAL
Qty: 40 TABLET | Refills: 5 | Status: SHIPPED | OUTPATIENT
Start: 2017-12-06 | End: 2019-10-21

## 2017-12-06 NOTE — PROGRESS NOTES
"Established Patient   SUBJECTIVE:  Patient ID: Georgie Wetzel is a 37 y.o. female.  Chief Complaint: Headache and Follow-up    History of Present Illness:  Georgie Wetzel is a 37 y.o. female with a history of migraines, GERD, anxiety, and depression, who presents to clinic alone for follow-up of headaches.     Initial HA HPI:  Migraines initially began when she was in 3rd grade, at the time she would experience loss of vision on the right side would vomit and then her headaches would go away, this type of headache ended when she was 15 years old.  From when she was 15 until last October, she would still experience migraines on average 1-2 per month, more so when she was very stressed.  Last October she experienced another migraine with aura (described above), at the time she was self-administering a lot of estrogen supplements.  She immediately stopped the estrogen supplements and restarted low-dose birth control in December 2016 and has not experienced another migraine with aura since.  Between December 2016 to last week she would get a migraine once every other month, she would treat her migraines with excedrin migraine and it would go away every single time. Two weeks ago (October 11), she was about to fly back from [facility] and while she was standing in the security line, she got an intense smell of gasoline, that evening she began to experience nausea and just started to "feel bad".  Beginning Thursday morning, the same day she started her menstrual cycle, her head felt sore all day, offers the headache never became a full blown migraine, just felt like soreness all over.  Headache lasted until last Thursday, stated she decided she needed to stop working out so hard and decided to go get a full body massage which made her feel much better, and Friday she felt back to normal.  She has experienced a headache similar to this in the past, this is not the worst headache of her life.  She has tried using " "peppermint oil on her forehead/cheeks which does help the pain.  Historically, headaches are always worse/more frequent when she is under increased stress.  She changed jobs over the past few months and feels like she has nothing to do at work so she has been staring at her iphone or ipad watching netflix for numerous hours, more so in the last few weeks than she ever has.  She does feel staring at her phone/ipad is a trigger.  She does offer she has been experiencing "terrible anxiety", has always suffered from anxiety, however over the past few months it has been significantly worse.  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  Her boyfriend moved back to [place] and thus she does not see him regularly which also causes her worsening stress because she feels he is her support system.  This is also the first relationship she has had since her divorce a few years ago.  Also causing herself worsening stress with regards to her headaches, states she is always assuming the worst, mother  from ovarian cancer in  and anytime she gets a symptom she convinces herself she has cancer or something really bad, which also worsens her headaches.  She saw mental health following the death of her mother, but has not seen mental health since then, does offer she thinks seeing a therapist or psychiatrist would be beneficial for her.   Associated Symptoms - nausea (subsides when she "de-stresses"), sensitive to her ipad and iphone (photophobia),   Triggers - stress, staring at screens   Aggravating Factors - if she went to spin class   Alleviating Factors - Advil migraine   Head Trauma? Infection? Fever? Cancer? Pregnancy? <-- denies   Recent Changes - change " in job - started working with [employer] in February and things have been awful, used to be on the [place] [employer], she was the vice-chair and she fell in love with the president and has been accused of sleeping with the president for gain, people hired private investigators to follow them, then began writing on a blog about their relationship   Sleep - she is not sleeping well, states she is exhausted all the time, trouble falling asleep and staying asleep    Family Hx of Migraines (mom)  Positives in bold: Hx of Kidney Stones, asthma, GI bleed, osteoporosis, CAD/MI, CVA/TIA, DM <-- denies     Recommendations made at last Office Visit on 10/23/2017:  - Discussed I do not feel imaging is needed at this time, but offered if it will give her piece of mind, I will be happy to order an MRI of the brain, she defers at this time   - For headache prevention and also to help with sleep - start Zanaflex 2 mg               Take 1-2 tabs around bedtime, no alcohol or driving with this medication  - Discussed alternative preventive options including elavil, AED, however she does not like the idea of taking seizure medication and does not want to take anything that will cause her to gain weight - will defer anti-depressant to psychiatry   - For acute headaches - given Diclofenac 75 mg tabs               Take 1 tab every 8-10 hours as needed for headaches, no more than 2 tabs/day or 3 days per week   - Referral placed to psychiatry   - Labs ordered - TSH, FT4, Vitamin D   - Start tracking headaches via migraine buddy lobo on phone   - Work on lifestyle modifications such as good sleep hygiene, stress reduction techniques, healthy diet, regular exercise   - RTC in 6 weeks or sooner if needed     12/06/2017 - Interval History:  Headaches are definitely not as constant, she does feel her headaches have been significantly better since last office visit.  States she is currently getting a headache on average once per week,  however the intensity and duration are much less.  She has been taking Zanaflex 4 mg nightly which has been helping with her sleep and with the stiffness in her neck.  When she gets a headache she either takes Advil migraine or Diclofenac depending on the intensity of the headache, both of which are effective in aborting her headache.  Computer screens, ipad, and iphone screens are not bothering her as much as they were previously.  She has not been able to get an appointment with psychiatry but is even more aware of how necessary this appointment is as she has continued to experience anxiety and panic attacks, though they are better now that she is sleeping at night.  She is happy with the state of her headaches and does not feel any adjustments or changes in her medication regimen are necessary at this time.     Current Medications:    ACZONE 5 % topical gel, APPLY A THIN FILM TO FACE IN THE MORNING, Disp: , Rfl: 6    diclofenac (VOLTAREN) 75 MG EC tablet, Take 1 tab by mouth every 8-10 hours as needed for headaches. No more than 2 tabs/day, no more than 3 days/week., Disp: 40 tablet, Rfl: 5    LACTOBACILLUS ACIDOPHILUS (ACIDOPHILUS ORAL), Take by mouth., Disp: , Rfl:     norethindrone-ethinyl estradiol (MICROGESTIN 1/20) 1-20 mg-mcg per tablet, Take 1 tablet by mouth once daily., Disp: 21 tablet, Rfl: 12    omeprazole (PRILOSEC OTC) 20 MG tablet, Take 40 mg by mouth once daily. , Disp: , Rfl:     spironolactone (ALDACTONE) 50 MG tablet, Take 100 mg by mouth once daily., Disp: , Rfl: 3    SS 10-2 10-2 % Clsr, USE TO WASH FACE EVERY DAY, Disp: , Rfl: 6    tiZANidine (ZANAFLEX) 2 MG tablet, 1-2 tabs around bedtime (muscle relaxer).  No driving with this medication. No alcohol with med., Disp: 60 tablet, Rfl: 5    ZIANA gel, APPLY A THIN FILM TO FACE AT BEDTIME, Disp: , Rfl: 6    Review of Systems - as per HPI, otherwise a balanced 10 systems review is negative.    OBJECTIVE:  Vitals:  /62 (BP  "Location: Right arm, Patient Position: Sitting, BP Method: Large (Automatic))   Pulse 62   Ht 5' 5" (1.651 m)   Wt 59 kg (130 lb)   LMP 11/09/2017 (Approximate)   BMI 21.63 kg/m²     Physical Exam- deferred     Review of Data:   Notes from urgent care visit and OBGYN  Labs:  Office Visit on 11/08/2017   Component Date Value Ref Range Status    Rapid Influenza A Ag 11/08/2017 Negative  Negative Final    Rapid Influenza B Ag 11/08/2017 Negative  Negative Final     Acceptable 11/08/2017 Yes   Final   Lab Visit on 10/23/2017   Component Date Value Ref Range Status    TSH 10/23/2017 0.550  0.400 - 4.000 uIU/mL Final    Free T4 10/23/2017 1.06  0.71 - 1.51 ng/dL Final    Vit D, 25-Hydroxy 10/23/2017 31  30 - 96 ng/mL Final   Office Visit on 10/17/2017   Component Date Value Ref Range Status    Chlamydia, Amplified DNA 10/18/2017 Not Detected   Final    N gonorrhoeae, amplified DNA 10/18/2017 Not Detected   Final    Trichomonas vaginalis 10/17/2017 Negative  Negative Final    Gardnerella vaginalis 10/17/2017 Negative  Negative Final    Candida sp 10/17/2017 Positive* Negative Final    Urine Culture, Routine 10/18/2017 No significant growth   Final     Imaging:  Results for orders placed or performed in visit on 08/24/11   MRI Brain W WO Contrast    Narrative    DATE OF EXAM: Aug 24 2011      MRI   0007  -  MRI BRAIN W AND WO CONTRAST:     [id number]     CLINICAL HISTORY:   253.8  30 YEAR OLD FEMALE WITH PITUITARY DISORDER      PROCEDURE COMMENT:        ICD 9 CODE(S):   ()     CPT 4 CODE(S)/MODIFIER(S):   ()     RESULTS: AXIAL FLAIR AND AXIAL T1 POST CONTRAST IMAGING OF THE WHOLE   BRAIN WITH THIN SECTION IMAGING OF THE SELLA INCLUDING SAGITTAL AND   CORONAL T1 PRE AND POST CONTRAST AND CORONAL T2 IMAGING.     ON THE LIMITED WHOLE BRAIN IMAGING THE BRAIN PARENCHYMA IS NORMAL IN   SIGNAL AND CONTOUR.  THE VENTRICLES ARE NORMAL IN SIZE AND CONFIGURATION   WITHOUT EVIDENCE FOR HYDROCEPHALUS.  " THERE IS NO MIDLINE SHIFT OR MASS   EFFECT.  NO ABNORMAL PARENCHYMAL ENHANCEMENT.     SELLA -- THE PITUITARY GLAND IS NORMAL IN HEIGHT AND CONTOUR.  THE   INFUNDIBULUM IS MIDLINE WITHOUT EVIDENCE FOR THICKENING.  NO EVIDENCE FOR   HYPOENHANCING FOCUS TO SUGGEST DEFINITE MICROADENOMA.  THE SUPRASELLAR   NEUROVASCULAR STRUCTURES ARE WITHIN NORMAL LIMITS.     IMPRESSION: UNREMARKABLE MRI OF THE BRAIN AND SELLA SPECIFICALLY WITHOUT   EVIDENCE FOR PITUITARY ENLARGEMENT OR FOCAL ABNORMAL PITUITARY   ENHANCEMENT TO SUGGEST FOCAL LESION.  CLINICAL CORRELATION AND FOLLOW-UP   AS WARRANTED.        : myles  Transcribe Date/Time: Aug 24 2011 10:10A  Dictated by : ABRAM CARBALLO DO  Read On: Aug 24 2011  7:12A  Abram Carballo MD [id number]  Images were reviewed, findings were verified and document was   electronically  SIGNED BY: ABRAM CARBALLO DO On: Aug 24 2011  3:20P        Note: I have independently reviewed any/all imaging/labs/tests and agree with the report (s) as documented.  Any discrepancies will be as noted/demarcated by free text.  JOLYNN AHN 12/6/2017    ASSESSMENT:  1. Nonintractable episodic headache, unspecified headache type    2. Migraine with aura and without status migrainosus, not intractable    3. Trouble in sleeping    4. Myofascial pain    5. Anxiety        PLAN:  - Schedule appointment with psychiatry to help with anxiety attacks   - Continue Zanaflex 4 mg nightly   - Has diclofenac 75 mg tabs available if needed for headaches  - Continue tracking headaches   - Follow up as needed    Discussed if headaches come back or if she feels she needs to be seen again, will be happy to see her back in clinic     Orders Placed This Encounter    tiZANidine (ZANAFLEX) 2 MG tablet    diclofenac (VOLTAREN) 75 MG EC tablet     Questions and concerns were sought and answered to the patient's stated verbal satisfaction.  The patient verbalizes understanding and agreement with the above stated treatment plan.        Rosa Parikh, FNP-C  [facility]   [phone]    Dr. Ramírez was available during today's encounter.

## 2017-12-13 ENCOUNTER — PATIENT MESSAGE (OUTPATIENT)
Dept: NEUROLOGY | Facility: CLINIC | Age: 37
End: 2017-12-13

## 2018-01-02 ENCOUNTER — IMMUNIZATION (OUTPATIENT)
Dept: INTERNAL MEDICINE | Facility: CLINIC | Age: 38
End: 2018-01-02
Payer: COMMERCIAL

## 2018-01-02 ENCOUNTER — OFFICE VISIT (OUTPATIENT)
Dept: INTERNAL MEDICINE | Facility: CLINIC | Age: 38
End: 2018-01-02
Payer: COMMERCIAL

## 2018-01-02 VITALS
DIASTOLIC BLOOD PRESSURE: 70 MMHG | OXYGEN SATURATION: 99 % | HEIGHT: 65 IN | TEMPERATURE: 98 F | WEIGHT: 128.44 LBS | SYSTOLIC BLOOD PRESSURE: 102 MMHG | HEART RATE: 67 BPM | BODY MASS INDEX: 21.4 KG/M2

## 2018-01-02 DIAGNOSIS — N30.00 ACUTE CYSTITIS WITHOUT HEMATURIA: Primary | ICD-10-CM

## 2018-01-02 PROCEDURE — 99213 OFFICE O/P EST LOW 20 MIN: CPT | Mod: S$GLB,,, | Performed by: INTERNAL MEDICINE

## 2018-01-02 PROCEDURE — 87086 URINE CULTURE/COLONY COUNT: CPT

## 2018-01-02 PROCEDURE — 87186 SC STD MICRODIL/AGAR DIL: CPT

## 2018-01-02 PROCEDURE — 90686 IIV4 VACC NO PRSV 0.5 ML IM: CPT | Mod: S$GLB,,, | Performed by: INTERNAL MEDICINE

## 2018-01-02 PROCEDURE — 87077 CULTURE AEROBIC IDENTIFY: CPT

## 2018-01-02 PROCEDURE — 87088 URINE BACTERIA CULTURE: CPT

## 2018-01-02 PROCEDURE — 99999 PR PBB SHADOW E&M-EST. PATIENT-LVL IV: CPT | Mod: PBBFAC,,, | Performed by: INTERNAL MEDICINE

## 2018-01-02 PROCEDURE — 90471 IMMUNIZATION ADMIN: CPT | Mod: S$GLB,,, | Performed by: INTERNAL MEDICINE

## 2018-01-02 RX ORDER — NITROFURANTOIN 25; 75 MG/1; MG/1
100 CAPSULE ORAL 2 TIMES DAILY
Qty: 20 CAPSULE | Refills: 0 | Status: SHIPPED | OUTPATIENT
Start: 2018-01-02 | End: 2018-01-12

## 2018-01-02 NOTE — PROGRESS NOTES
Subjective:       Patient ID: Georgie Wetzel is a 37 y.o. female.    Chief Complaint: Urinary Tract Infection (1+ day , burning urine Freq , pressure, patient has been taking AZO for relief ); Immunizations (Patient would like to get her shots updated if posible); and Cerumen Impaction (Both Lt & Rt Ears)    HPI   Yest am experienced uti symptoms.  Pain at end of urination.  Urgency.     Took azo for symptomatic relief.  Better today.      Pinching feeling.  Drinking lots of water.       Last UTI -             About a year ago.      No n,v.      No abd pain, back pain, hematuria.    No recent antibiotic use.      Questions about immunizations.  Was told by  that she had cerumen impaction.  Hearing a bit reduced when talking on phone.         Review of Systems   Constitutional: Negative for fever and unexpected weight change.   HENT: Negative for congestion and postnasal drip.    Eyes: Negative for pain, discharge and visual disturbance.   Respiratory: Negative for cough, chest tightness, shortness of breath and wheezing.    Cardiovascular: Negative for chest pain and leg swelling.   Gastrointestinal: Negative for abdominal pain, constipation, diarrhea and nausea.   Genitourinary: Positive for dysuria. Negative for difficulty urinating and hematuria.   Skin: Negative for rash.   Neurological: Negative for headaches.   Psychiatric/Behavioral: Negative for dysphoric mood and sleep disturbance. The patient is not nervous/anxious.        U/a positive for leukocytes and red blood cells.    Objective:      Physical Exam   Constitutional: She is oriented to person, place, and time. She appears well-developed and well-nourished.   Eyes: No scleral icterus.   Neck: No JVD present. No thyromegaly present.   Abdominal: Soft. She exhibits no mass. There is no tenderness.   Musculoskeletal: She exhibits no edema or tenderness (of flank).   Neurological: She is alert and oriented to person, place, and time.   Psychiatric: She  has a normal mood and affect. Her behavior is normal.       Assessment:       1. Acute cystitis without hematuria        Plan:       Georgie was seen today for urinary tract infection, immunizations and cerumen impaction.    Diagnoses and all orders for this visit:    Acute cystitis without hematuria  -     Urine culture  -     nitrofurantoin, macrocrystal-monohydrate, (MACROBID) 100 MG capsule; Take 1 capsule (100 mg total) by mouth 2 (two) times daily.       Flu vax     Encouraged to establish with PCP     She may return to  center for ear irrigation

## 2018-01-04 LAB — BACTERIA UR CULT: NORMAL

## 2018-01-29 ENCOUNTER — OFFICE VISIT (OUTPATIENT)
Dept: NEUROLOGY | Facility: CLINIC | Age: 38
End: 2018-01-29
Payer: COMMERCIAL

## 2018-01-29 VITALS
HEIGHT: 65 IN | SYSTOLIC BLOOD PRESSURE: 127 MMHG | DIASTOLIC BLOOD PRESSURE: 79 MMHG | HEART RATE: 75 BPM | BODY MASS INDEX: 21.64 KG/M2 | WEIGHT: 129.88 LBS

## 2018-01-29 DIAGNOSIS — H53.9 VISUAL AURA: ICD-10-CM

## 2018-01-29 DIAGNOSIS — F41.9 ANXIETY: ICD-10-CM

## 2018-01-29 DIAGNOSIS — G44.89 CHRONIC MIXED HEADACHE SYNDROME: ICD-10-CM

## 2018-01-29 DIAGNOSIS — G43.109 MIGRAINE WITH AURA AND WITHOUT STATUS MIGRAINOSUS, NOT INTRACTABLE: Primary | ICD-10-CM

## 2018-01-29 PROCEDURE — 3008F BODY MASS INDEX DOCD: CPT | Mod: S$GLB,,, | Performed by: NURSE PRACTITIONER

## 2018-01-29 PROCEDURE — 99214 OFFICE O/P EST MOD 30 MIN: CPT | Mod: S$GLB,,, | Performed by: NURSE PRACTITIONER

## 2018-01-29 PROCEDURE — 99999 PR PBB SHADOW E&M-EST. PATIENT-LVL III: CPT | Mod: PBBFAC,,, | Performed by: NURSE PRACTITIONER

## 2018-01-29 RX ORDER — PROPRANOLOL HYDROCHLORIDE 10 MG/1
TABLET ORAL
Qty: 90 TABLET | Refills: 2 | Status: SHIPPED | OUTPATIENT
Start: 2018-01-29 | End: 2018-06-07

## 2018-01-29 NOTE — PROGRESS NOTES
"Established Patient   SUBJECTIVE:  Patient ID: Georgie Wetzel is a 37 y.o. female.  Chief Complaint: Headache and Follow-up    History of Present Illness:  Georgie Wetzel is a 37 y.o. female with anxiety, headaches, and GERD, who presents to clinic alone for follow-up of headaches.     Recommendations made at last Office Visit on 12/6/2017:  - Schedule appointment with psychiatry to help with anxiety attacks   - Continue Zanaflex 4 mg nightly   - Has diclofenac 75 mg tabs available if needed for headaches  - Continue tracking headaches   - Follow up as needed    01/29/2018 - Interval History:  Migraines with aura from [age] yo, then had another one in October 2016 and at that time she was supplementing with estrogen, progesterone, then had another one last Wednesday (01/24).  She is very concerned about this migraine as she cannot identify a reason for why this migraine occurred.  This migraine followed the same pattern it always has, she begins to see white, "lightning", squiggly lines in her vision, this visual aura gradually progresses over 5-10 minutes and lasts 15-20 minutes.  Visual aura is immediately followed by a pounding headache that is associated with nausea and photophobia.  Every other time she has had a migraine with aura, she has been able to identify a cause, typically a hormonal problem causes her migraines.  She has an appointment with her GYN for February.  Her menstrual cycle is not regular, and it has not been for years.  She is not sure if she is pregnant or not, she is sexually active, takes oral contraceptive, however she was on antibiotics in the beginning of January so she is concerned she could possibly be pregnant.  She has not taken a pregnancy test, does feel her breasts are more tender than normal.  During the day she is fine, however she gets anxious at night that she is going to experience the visual symptoms again.  She is no longer taking Zanaflex nightly.  She has not made " appointment with psychiatry yet.  Admits she is also anxious there is something wrong in her brain that could be causing her to have more migraines.      12/06/2017 - Interval History:  Headaches are definitely not as constant, she does feel her headaches have been significantly better since last office visit.  States she is currently getting a headache on average once per week, however the intensity and duration are much less.  She has been taking Zanaflex 4 mg nightly which has been helping with her sleep and with the stiffness in her neck.  When she gets a headache she either takes Advil migraine or Diclofenac depending on the intensity of the headache, both of which are effective in aborting her headache.  Computer screens, ipad, and iphone screens are not bothering her as much as they were previously.  She has not been able to get an appointment with psychiatry but is even more aware of how necessary this appointment is as she has continued to experience anxiety and panic attacks, though they are better now that she is sleeping at night.  She is happy with the state of her headaches and does not feel any adjustments or changes in her medication regimen are necessary at this time.     Initial HA HPI:  Migraines initially began when she was in 3rd grade, at the time she would experience loss of vision on the right side would vomit and then her headaches would go away, this type of headache ended when she was 15 years old.  From when she was 15 until last October, she would still experience migraines on average 1-2 per month, more so when she was very stressed.  Last October she experienced another migraine with aura (described above), at the time she was self-administering a lot of estrogen supplements.  She immediately stopped the estrogen supplements and restarted low-dose birth control in December 2016 and has not experienced another migraine with aura since.  Between December 2016 to last week she would get a  "migraine once every other month, she would treat her migraines with excedrin migraine and it would go away every single time. Two weeks ago (October 11), she was about to fly back from [facility]. and while she was standing in the security line, she got an intense smell of gasoline, that evening she began to experience nausea and just started to "feel bad".  Beginning Thursday morning, the same day she started her menstrual cycle, her head felt sore all day, offers the headache never became a full blown migraine, just felt like soreness all over.  Headache lasted until last Thursday, stated she decided she needed to stop working out so hard and decided to go get a full body massage which made her feel much better, and Friday she felt back to normal.  She has experienced a headache similar to this in the past, this is not the worst headache of her life.  She has tried using peppermint oil on her forehead/cheeks which does help the pain.  Historically, headaches are always worse/more frequent when she is under increased stress.  She changed jobs over the past few months and feels like she has nothing to do at work so she has been staring at her iphone or ipad watching netflix for numerous hours, more so in the last few weeks than she ever has.  She does feel staring at her phone/ipad is a trigger.  She does offer she has been experiencing "terrible anxiety", has always suffered from anxiety, however over the past few months it has been significantly worse.  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  Her boyfriend moved back to [place] and thus she does not see him regularly which also causes her worsening stress because she feels he is her support " "system.  This is also the first relationship she has had since her divorce a few years ago.  Also causing herself worsening stress with regards to her headaches, states she is always assuming the worst, mother  from ovarian cancer in  and anytime she gets a symptom she convinces herself she has cancer or something really bad, which also worsens her headaches.  She saw mental health following the death of her mother, but has not seen mental health since then, does offer she thinks seeing a therapist or psychiatrist would be beneficial for her.   Associated Symptoms - nausea (subsides when she "de-stresses"), sensitive to her ipad and iphone (photophobia),   Triggers - stress, staring at screens   Aggravating Factors - if she went to spin class   Alleviating Factors - Advil migraine   Head Trauma? Infection? Fever? Cancer? Pregnancy? <-- denies   Recent Changes - change in job - started working with [employer] in February and things have been awful, used to be on the [place] [employer], she was the vice-chair and she fell in love with the president and has been accused of sleeping with the president for gain, people hired private investigators to follow them, then began writing on a blog about their relationship   Sleep - she is not sleeping well, states she is exhausted all the time, trouble falling asleep and staying asleep    Family Hx of Migraines (mom)  Positives in bold: Hx of Kidney Stones, asthma, GI bleed, osteoporosis, CAD/MI, CVA/TIA, DM <-- denies     Treatments Tried and Response  Excedrin migraine - used to work   Ibuprofen migraine - works within 20 minutes   Zanaflex - was helping, is no longer taking   Diclofenac - helps     Current Medications:    ACZONE 5 % topical gel, APPLY A THIN FILM TO FACE IN THE MORNING, Disp: , Rfl: 6    diclofenac (VOLTAREN) 75 MG EC tablet, Take 1 tab by mouth every 8-10 hours as needed for headaches. No more than 2 tabs/day, no more than 3 days/week., " "Disp: 40 tablet, Rfl: 5    LACTOBACILLUS ACIDOPHILUS (ACIDOPHILUS ORAL), Take by mouth., Disp: , Rfl:     norethindrone-ethinyl estradiol (MICROGESTIN 1/20) 1-20 mg-mcg per tablet, Take 1 tablet by mouth once daily., Disp: 21 tablet, Rfl: 12    omeprazole (PRILOSEC OTC) 20 MG tablet, Take 40 mg by mouth once daily. , Disp: , Rfl:     spironolactone (ALDACTONE) 50 MG tablet, Take 100 mg by mouth once daily., Disp: , Rfl: 3    SS 10-2 10-2 % Clsr, USE TO WASH FACE EVERY DAY, Disp: , Rfl: 6    tiZANidine (ZANAFLEX) 2 MG tablet, 1-2 tabs around bedtime (muscle relaxer).  No driving with this medication. No alcohol with med., Disp: 60 tablet, Rfl: 5    ZIANA gel, APPLY A THIN FILM TO FACE AT BEDTIME, Disp: , Rfl: 6    hydrOXYzine pamoate (VISTARIL) 50 MG Cap, Take 1 capsule one hour before MRI, may repeat 30-45 minutes later if needed., Disp: 2 capsule, Rfl: 0    propranolol (INDERAL) 10 MG tablet, 1 tab daily x 1 week, then 1 tabs twice daily x 1 week, then 1 tab 3x/day., Disp: 90 tablet, Rfl: 2    Review of Systems - as per HPI, otherwise a balanced 10 systems review is negative.    OBJECTIVE:  Vitals:  /79 (BP Location: Right arm, Patient Position: Sitting, BP Method: Large (Automatic))   Pulse 75   Ht 5' 5" (1.651 m)   Wt 58.9 kg (129 lb 13.6 oz)   BMI 21.61 kg/m²     Physical Exam:  Constitutional: she appears well-developed and well-nourished. she is well groomed. NAD   HENT:    Head: Normocephalic and atraumatic  Eyes: Conjunctivae and EOM are normal  Musculoskeletal: Normal range of motion. No joint stiffness.   Skin: Skin is warm and dry.  Psychiatric: Mood and affect are normal    Neuro: Patient is awake, alert, and oriented to person, place, and time. Language is intact and fluent.  Recent and remote memory are intact.  Normal attention and concentration.  Facial movement is symmetric.  Gait is normal.     Review of Data:   Notes from Dr. Mccullough reviewed   Labs:  Office Visit on 01/02/2018 "   Component Date Value Ref Range Status    Urine Culture, Routine 01/02/2018    Final                    Value:ESCHERICHIA COLI  10,000 - 49,999 cfu/ml  No other significant isolate     Office Visit on 11/08/2017   Component Date Value Ref Range Status    Rapid Influenza A Ag 11/08/2017 Negative  Negative Final    Rapid Influenza B Ag 11/08/2017 Negative  Negative Final     Acceptable 11/08/2017 Yes   Final       Imaging:  Results for orders placed or performed in visit on 08/24/11   MRI Brain W WO Contrast    Narrative    DATE OF EXAM: Aug 24 2011      MRI   0007  -  MRI BRAIN W AND WO CONTRAST:     [id number]     CLINICAL HISTORY:   253.8  30 YEAR OLD FEMALE WITH PITUITARY DISORDER      PROCEDURE COMMENT:        ICD 9 CODE(S):   ()     CPT 4 CODE(S)/MODIFIER(S):   ()     RESULTS: AXIAL FLAIR AND AXIAL T1 POST CONTRAST IMAGING OF THE WHOLE   BRAIN WITH THIN SECTION IMAGING OF THE SELLA INCLUDING SAGITTAL AND   CORONAL T1 PRE AND POST CONTRAST AND CORONAL T2 IMAGING.     ON THE LIMITED WHOLE BRAIN IMAGING THE BRAIN PARENCHYMA IS NORMAL IN   SIGNAL AND CONTOUR.  THE VENTRICLES ARE NORMAL IN SIZE AND CONFIGURATION   WITHOUT EVIDENCE FOR HYDROCEPHALUS.  THERE IS NO MIDLINE SHIFT OR MASS   EFFECT.  NO ABNORMAL PARENCHYMAL ENHANCEMENT.     SELLA -- THE PITUITARY GLAND IS NORMAL IN HEIGHT AND CONTOUR.  THE   INFUNDIBULUM IS MIDLINE WITHOUT EVIDENCE FOR THICKENING.  NO EVIDENCE FOR   HYPOENHANCING FOCUS TO SUGGEST DEFINITE MICROADENOMA.  THE SUPRASELLAR   NEUROVASCULAR STRUCTURES ARE WITHIN NORMAL LIMITS.     IMPRESSION: UNREMARKABLE MRI OF THE BRAIN AND SELLA SPECIFICALLY WITHOUT   EVIDENCE FOR PITUITARY ENLARGEMENT OR FOCAL ABNORMAL PITUITARY   ENHANCEMENT TO SUGGEST FOCAL LESION.  CLINICAL CORRELATION AND FOLLOW-UP   AS WARRANTED.        : myles  Transcribe Date/Time: Aug 24 2011 10:10A  Dictated by : ABRAM CARBALLO DO  Read On: Aug 24 2011  7:12A  Abram Carballo MD [id number]  Images were  reviewed, findings were verified and document was   electronically  SIGNED BY: JORGE SEGURA DO On: Aug 24 2011  3:20P        Note: I have independently reviewed any/all imaging/labs/tests and agree with the report (s) as documented.  Any discrepancies will be as noted/demarcated by free text.  JOLYNN AHN 1/29/2018    Focused examination was undertaken today.     ASSESSMENT:  1. Migraine with aura and without status migrainosus, not intractable    2. Chronic mixed headache syndrome    3. Anxiety    4. Visual aura      PLAN:  - MRI Brain w/wo contrast ordered to r/o secondary cause of migraines with aura   - To help with anxiety and migraine prevention - start Propranolol 10 mg, titrate dose to TID over 3 weeks   - Has diclofenac available for migraine abortive   - Encouraged her to keep appt with GYN  - Offered to order urine pregnancy test, she deferred, states she will buy a pregnancy test at pharmacy and take prior to MRI, if she is pregnant, she will send message via [facility]   - Recommend she schedule appointment with mental health   - Discussed risks of taking combination oral contraceptive with migraine with aura, she will discuss changing OCP to progesterone only with GYN at appt in February   - Continue tracking headaches   - Discussed goals of therapy are to decrease the frequency, intensity, and duration of headaches  - Follow up in 1 month     Orders Placed This Encounter    MRI Brain W WO Contrast    propranolol (INDERAL) 10 MG tablet     I spent 30 minutes face-to-face with the patient with >50% of the time spent with counseling and education regarding:  - results of data, diagnosis, and recommendations stated above  - risks, benefits, and potential side effects of propranolol discussed   - alternative treatment options including amitriptyline vs effexor offered   - importance of healthy diet, regular exercise and sleep hygiene in the treatment of headaches     Questions and concerns were sought and  answered to the patient's stated verbal satisfaction.  The patient verbalizes understanding and agreement with the above stated treatment plan.       Rosa Parikh, FNP-C  [facility]   [phone]    Dr. Ramírez was available during today's encounter.

## 2018-01-31 ENCOUNTER — PATIENT MESSAGE (OUTPATIENT)
Dept: NEUROLOGY | Facility: CLINIC | Age: 38
End: 2018-01-31

## 2018-01-31 RX ORDER — HYDROXYZINE PAMOATE 50 MG/1
CAPSULE ORAL
Qty: 2 CAPSULE | Refills: 0 | Status: SHIPPED | OUTPATIENT
Start: 2018-01-31 | End: 2018-03-13

## 2018-02-16 ENCOUNTER — OFFICE VISIT (OUTPATIENT)
Dept: OPHTHALMOLOGY | Facility: CLINIC | Age: 38
End: 2018-02-16
Payer: COMMERCIAL

## 2018-02-16 DIAGNOSIS — H31.003 CHORIORETINAL SCAR OF BOTH EYES: Primary | ICD-10-CM

## 2018-02-16 PROCEDURE — 92225 PR SPECIAL EYE EXAM, INITIAL: CPT | Mod: LT,S$GLB,, | Performed by: OPHTHALMOLOGY

## 2018-02-16 PROCEDURE — 92004 COMPRE OPH EXAM NEW PT 1/>: CPT | Mod: S$GLB,,, | Performed by: OPHTHALMOLOGY

## 2018-02-16 PROCEDURE — 99999 PR PBB SHADOW E&M-EST. PATIENT-LVL II: CPT | Mod: PBBFAC,,, | Performed by: OPHTHALMOLOGY

## 2018-02-16 RX ORDER — SPIRONOLACTONE 100 MG/1
100 TABLET, FILM COATED ORAL DAILY
COMMUNITY
Start: 2018-01-10

## 2018-02-20 ENCOUNTER — OFFICE VISIT (OUTPATIENT)
Dept: OBSTETRICS AND GYNECOLOGY | Facility: CLINIC | Age: 38
End: 2018-02-20
Attending: OBSTETRICS & GYNECOLOGY
Payer: COMMERCIAL

## 2018-02-20 VITALS
HEIGHT: 65 IN | DIASTOLIC BLOOD PRESSURE: 80 MMHG | SYSTOLIC BLOOD PRESSURE: 118 MMHG | BODY MASS INDEX: 20.46 KG/M2 | WEIGHT: 122.81 LBS

## 2018-02-20 DIAGNOSIS — E28.39 DECREASED ESTROGEN LEVEL: Primary | ICD-10-CM

## 2018-02-20 PROCEDURE — 99213 OFFICE O/P EST LOW 20 MIN: CPT | Mod: S$GLB,,, | Performed by: OBSTETRICS & GYNECOLOGY

## 2018-02-20 PROCEDURE — 3008F BODY MASS INDEX DOCD: CPT | Mod: S$GLB,,, | Performed by: OBSTETRICS & GYNECOLOGY

## 2018-02-20 PROCEDURE — 99999 PR PBB SHADOW E&M-EST. PATIENT-LVL III: CPT | Mod: PBBFAC,,, | Performed by: OBSTETRICS & GYNECOLOGY

## 2018-02-20 NOTE — PROGRESS NOTES
TALK ONLY    Chief Complaint   Patient presents with    Migraine       HPI:  Georgie Wetzel is a 37 y.o. female patient  who presents today to discuss her hormones.  She has a prior history of amenorrhea for which she is on OCPs for menstrual regulation as well as contraception.  She has a history of ocular migraines and reports having one 10/2017, then again about 3 weeks ago.  She is concerned that the OCPs may be causing these ocular migraines.  Hormone levels drawn 16 were normal.  She is scheduled for MRI of the brain 18 and then will follow-up with her neurologist.  Patient's last menstrual period was 2018 (approximate).     10/23/17 TSH .550    16  FSH 10,  LH 2.9,  E2 57,  Prolactin 9.1    Pap 17: Negative      Past Medical History:   Diagnosis Date    Acne     Depression     treated with medication last use , situational     GERD (gastroesophageal reflux disease)     High cholesterol     Retinal detachment        Past Surgical History:   Procedure Laterality Date     Retinal Tear OS        COLONOSCOPY N/A 2017    Procedure: COLONOSCOPY;  Surgeon: Vahid Lynn MD;  Location: [address]);  Service: Endoscopy;  Laterality: N/A;  after Aug 13 for hx of diverticulitis    CRYOTHERAPY      RETINAL DETACHMENT SURGERY      RPK  OU Dr Munoz    2008    TONSILLECTOMY, ADENOIDECTOMY           Diagnosis:  1. Decreased estrogen level          PLAN:    Orders Placed This Encounter    Follicle stimulating hormone    Luteinizing hormone    Estradiol    Prolactin    TSH       Patient was counseled today on her usage of OCPs and her recent ocular migraines.  She is currently on a 20 mcg pill and has been on this pill for the past year without migraines except for these recent events.  We discussed checking hormone levels at the end of the hormone free week of her OCPs.  She plans to follow up with her neurologist after MRI of her head.      Follow-up  after labs.    Total time of visit / counselin minutes.

## 2018-02-26 ENCOUNTER — HOSPITAL ENCOUNTER (OUTPATIENT)
Dept: RADIOLOGY | Facility: OTHER | Age: 38
Discharge: HOME | End: 2018-02-26
Attending: NURSE PRACTITIONER
Payer: COMMERCIAL

## 2018-02-26 ENCOUNTER — TELEPHONE (OUTPATIENT)
Dept: NEUROLOGY | Facility: CLINIC | Age: 38
End: 2018-02-26

## 2018-02-26 DIAGNOSIS — G44.89 CHRONIC MIXED HEADACHE SYNDROME: ICD-10-CM

## 2018-02-26 DIAGNOSIS — F41.9 ANXIETY: ICD-10-CM

## 2018-02-26 DIAGNOSIS — H53.9 VISUAL AURA: ICD-10-CM

## 2018-02-26 DIAGNOSIS — G43.109 MIGRAINE WITH AURA AND WITHOUT STATUS MIGRAINOSUS, NOT INTRACTABLE: ICD-10-CM

## 2018-02-26 PROCEDURE — 70551 MRI BRAIN STEM W/O DYE: CPT | Mod: 26,,, | Performed by: RADIOLOGY

## 2018-02-26 PROCEDURE — 70551 MRI BRAIN STEM W/O DYE: CPT | Mod: TC

## 2018-02-26 NOTE — TELEPHONE ENCOUNTER
----- Message from RUPAL Montaño sent at 2/26/2018  2:50 PM CST -----  Please let her know her MRI Brain is normal.     Thanks,    Jacqui

## 2018-02-28 ENCOUNTER — TELEPHONE (OUTPATIENT)
Dept: NEUROLOGY | Facility: CLINIC | Age: 38
End: 2018-02-28

## 2018-02-28 ENCOUNTER — OFFICE VISIT (OUTPATIENT)
Dept: NEUROLOGY | Facility: CLINIC | Age: 38
End: 2018-02-28
Payer: COMMERCIAL

## 2018-02-28 VITALS
HEART RATE: 63 BPM | WEIGHT: 126.31 LBS | DIASTOLIC BLOOD PRESSURE: 75 MMHG | BODY MASS INDEX: 21.04 KG/M2 | SYSTOLIC BLOOD PRESSURE: 110 MMHG | HEIGHT: 65 IN

## 2018-02-28 DIAGNOSIS — G43.109 MIGRAINE WITH AURA AND WITHOUT STATUS MIGRAINOSUS, NOT INTRACTABLE: Primary | ICD-10-CM

## 2018-02-28 DIAGNOSIS — F41.9 ANXIETY: ICD-10-CM

## 2018-02-28 DIAGNOSIS — E78.5 HYPERLIPIDEMIA, UNSPECIFIED HYPERLIPIDEMIA TYPE: ICD-10-CM

## 2018-02-28 PROCEDURE — 3008F BODY MASS INDEX DOCD: CPT | Mod: S$GLB,,, | Performed by: NURSE PRACTITIONER

## 2018-02-28 PROCEDURE — 99213 OFFICE O/P EST LOW 20 MIN: CPT | Mod: S$GLB,,, | Performed by: NURSE PRACTITIONER

## 2018-02-28 PROCEDURE — 99999 PR PBB SHADOW E&M-EST. PATIENT-LVL III: CPT | Mod: PBBFAC,,, | Performed by: NURSE PRACTITIONER

## 2018-02-28 RX ORDER — RIZATRIPTAN BENZOATE 5 MG/1
TABLET, ORALLY DISINTEGRATING ORAL
Qty: 12 TABLET | Refills: 5 | Status: SHIPPED | OUTPATIENT
Start: 2018-02-28

## 2018-02-28 NOTE — TELEPHONE ENCOUNTER
----- Message from RUPAL Montaño sent at 2/28/2018 10:08 AM CST -----  Contact: Luci lynne/ [facility] @ [phone]  Please let the pharmacy know I am aware of the interaction between Propranolol and Maxalt.  It is okay for her to take Maxalt while on Propranolol.     Thanks,     Jacqui     ----- Message -----  From: Kathryn Salcido RN  Sent: 2/28/2018   9:39 AM  To: RUPAL Montaño        ----- Message -----  From: Rico Barnes  Sent: 2/28/2018   9:07 AM  To: [facility] states they're being notified about drug interaction warning between rizatriptan (MAXALT-MLT) 5 MG disintegrating tablet and propranolol (INDERAL) 10 MG tablet. Pls call to confirm pt can take both.

## 2018-02-28 NOTE — PATIENT INSTRUCTIONS
Supplements to Consider for Migraine Prevention:  - Magnesium Oxide 400 mg once daily (can go to twice daily, but may cause diarrhea)  - Vitamin B2 (Riboflavin) 400 mg daily   - Co-Q10 200 mg daily

## 2018-02-28 NOTE — TELEPHONE ENCOUNTER
Called and left a message for Pharmacy that Prescriber is aware of the Propanolol and Maxalt issue and it is OK to take Maxalt.

## 2018-02-28 NOTE — PROGRESS NOTES
Established Patient   SUBJECTIVE:  Patient ID: Georgie Wetzel is a 37 y.o. female.  Chief Complaint: Headache and Follow-up    History of Present Illness:  Georgie Wetzel is a 37 y.o. female with migraines, anxiety and GERD, who presents to clinic alone for follow-up of headaches.     Recommendations made at last Office Visit on 1/29/2018:  - MRI Brain w/wo contrast ordered to r/o secondary cause of migraines with aura   - To help with anxiety and migraine prevention - start Propranolol 10 mg, titrate dose to TID over 3 weeks   - Has diclofenac available for migraine abortive   - Encouraged her to keep appt with GYN  - Offered to order urine pregnancy test, she deferred, states she will buy a pregnancy test at pharmacy and take prior to MRI, if she is pregnant, she will send message via [facility]   - Recommend she schedule appointment with mental health   - Discussed risks of taking combination oral contraceptive with migraine with aura, she will discuss changing OCP to progesterone only with GYN at appt in February   - Continue tracking headaches   - Discussed goals of therapy are to decrease the frequency, intensity, and duration of headaches  - Follow up in 1 month     02/28/2018 - Interval History:  MRI Brain normal.  Since last office visit, she has talked to some of her friends about her migraines and realized that multiple of her friends also suffer with migraines, which is comforting to her as well as knowing her MRI Brain was normal.  She has not experienced any migraines or auras since last visit.  Was seen by GYN who did not feel her migraines were attributed to hormone fluctuations, however he will be drawing blood work in a few weeks to test her hormone levels.  She has continued taking Propranolol 10 mg daily which she does feel is helping with her anxiety.  Has appointment to be psychiatry in April, as well as follow-up appointment with cardiology as she had hyperlipidemia, last checked in 2015,  "levels improved after 50 pound weight loss.  Overall, she is happy with the current state of her headaches.     01/29/2018 - Interval History:  Migraines with aura from [age] yo, then had another one in October 2016 and at that time she was supplementing with estrogen, progesterone, then had another one last Wednesday (01/24).  She is very concerned about this migraine as she cannot identify a reason for why this migraine occurred.  This migraine followed the same pattern it always has, she begins to see white, "lightning", squiggly lines in her vision, this visual aura gradually progresses over 5-10 minutes and lasts 15-20 minutes.  Visual aura is immediately followed by a pounding headache that is associated with nausea and photophobia.  Every other time she has had a migraine with aura, she has been able to identify a cause, typically a hormonal problem causes her migraines.  She has an appointment with her GYN for February.  Her menstrual cycle is not regular, and it has not been for years.  She is not sure if she is pregnant or not, she is sexually active, takes oral contraceptive, however she was on antibiotics in the beginning of January so she is concerned she could possibly be pregnant.  She has not taken a pregnancy test, does feel her breasts are more tender than normal.  During the day she is fine, however she gets anxious at night that she is going to experience the visual symptoms again.  She is no longer taking Zanaflex nightly.  She has not made appointment with psychiatry yet.  Admits she is also anxious there is something wrong in her brain that could be causing her to have more migraines.       12/06/2017 - Interval History:  Headaches are definitely not as constant, she does feel her headaches have been significantly better since last office visit.  States she is currently getting a headache on average once per week, however the intensity and duration are much less.  She has been taking Zanaflex " "4 mg nightly which has been helping with her sleep and with the stiffness in her neck.  When she gets a headache she either takes Advil migraine or Diclofenac depending on the intensity of the headache, both of which are effective in aborting her headache.  Computer screens, ipad, and iphone screens are not bothering her as much as they were previously.  She has not been able to get an appointment with psychiatry but is even more aware of how necessary this appointment is as she has continued to experience anxiety and panic attacks, though they are better now that she is sleeping at night.  She is happy with the state of her headaches and does not feel any adjustments or changes in her medication regimen are necessary at this time.      Initial HA HPI:  Migraines initially began when she was in 3rd grade, at the time she would experience loss of vision on the right side would vomit and then her headaches would go away, this type of headache ended when she was 15 years old.  From when she was 15 until last October, she would still experience migraines on average 1-2 per month, more so when she was very stressed.  Last October she experienced another migraine with aura (described above), at the time she was self-administering a lot of estrogen supplements.  She immediately stopped the estrogen supplements and restarted low-dose birth control in December 2016 and has not experienced another migraine with aura since.  Between December 2016 to last week she would get a migraine once every other month, she would treat her migraines with excedrin migraine and it would go away every single time. Two weeks ago (October 11), she was about to fly back from [facility] and while she was standing in the security line, she got an intense smell of gasoline, that evening she began to experience nausea and just started to "feel bad".  Beginning Thursday morning, the same day she started her menstrual cycle, her head felt sore all " "day, offers the headache never became a full blown migraine, just felt like soreness all over.  Headache lasted until last Thursday, stated she decided she needed to stop working out so hard and decided to go get a full body massage which made her feel much better, and Friday she felt back to normal.  She has experienced a headache similar to this in the past, this is not the worst headache of her life.  She has tried using peppermint oil on her forehead/cheeks which does help the pain.  Historically, headaches are always worse/more frequent when she is under increased stress.  She changed jobs over the past few months and feels like she has nothing to do at work so she has been staring at her iphone or ipad watching netflix for numerous hours, more so in the last few weeks than she ever has.  She does feel staring at her phone/ipad is a trigger.  She does offer she has been experiencing "terrible anxiety", has always suffered from anxiety, however over the past few months it has been significantly worse.  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  Her boyfriend moved back to [place] and thus she does not see him regularly which also causes her worsening stress because she feels he is her support system.  This is also the first relationship she has had since her divorce a few years ago.  Also causing herself worsening stress with regards to her headaches, states she is always assuming the worst, mother  from ovarian cancer in  and anytime she gets a symptom she convinces herself she has cancer or something really bad, which also worsens her headaches.  She saw mental health following the death of her mother, but has not seen mental health " "since then, does offer she thinks seeing a therapist or psychiatrist would be beneficial for her.   Associated Symptoms - nausea (subsides when she "de-stresses"), sensitive to her ipad and iphone (photophobia),   Triggers - stress, staring at screens   Aggravating Factors - if she went to spin class   Alleviating Factors - Advil migraine   Head Trauma? Infection? Fever? Cancer? Pregnancy? <-- denies   Recent Changes - change in job - started working with [email or web address] in February and things have been awful, used to be on the [employer], she was the vice-chair and she fell in love with the president and has been accused of sleeping with the president for gain, people hired private investigators to follow them, then began writing on a blog about their relationship   Sleep - she is not sleeping well, states she is exhausted all the time, trouble falling asleep and staying asleep    Family Hx of Migraines (mom)  Positives in bold: Hx of Kidney Stones, asthma, GI bleed, osteoporosis, CAD/MI, CVA/TIA, DM <-- denies      Treatments Tried and Response  Excedrin migraine - used to work   Ibuprofen migraine - works within 20 minutes   Zanaflex - was helping, is no longer taking   Diclofenac - helps   Propranolol - helping  Maxalt 5 mg Mlt - given today     Current Medications:    ACZONE 5 % topical gel, APPLY A THIN FILM TO FACE IN THE MORNING, Disp: , Rfl: 6    diclofenac (VOLTAREN) 75 MG EC tablet, Take 1 tab by mouth every 8-10 hours as needed for headaches. No more than 2 tabs/day, no more than 3 days/week., Disp: 40 tablet, Rfl: 5    hydrOXYzine pamoate (VISTARIL) 50 MG Cap, Take 1 capsule one hour before MRI, may repeat 30-45 minutes later if needed., Disp: 2 capsule, Rfl: 0    LACTOBACILLUS ACIDOPHILUS (ACIDOPHILUS ORAL), Take by mouth., Disp: , Rfl:     omeprazole (PRILOSEC OTC) 20 MG tablet, Take 40 mg by mouth once daily. , Disp: , Rfl:     propranolol (INDERAL) 10 MG tablet, 1 tab daily x 1 week, " "then 1 tabs twice daily x 1 week, then 1 tab 3x/day., Disp: 90 tablet, Rfl: 2    spironolactone (ALDACTONE) 100 MG tablet, , Disp: , Rfl:     SS 10-2 10-2 % Clsr, USE TO WASH FACE EVERY DAY, Disp: , Rfl: 6    tiZANidine (ZANAFLEX) 2 MG tablet, 1-2 tabs around bedtime (muscle relaxer).  No driving with this medication. No alcohol with med., Disp: 60 tablet, Rfl: 5    ZIANA gel, APPLY A THIN FILM TO FACE AT BEDTIME, Disp: , Rfl: 6    norethindrone-ethinyl estradiol (MICROGESTIN 1/20) 1-20 mg-mcg per tablet, Take 1 tablet by mouth once daily., Disp: 21 tablet, Rfl: 12    rizatriptan (MAXALT-MLT) 5 MG disintegrating tablet, Dissolve 1 tab on tongue at onset of migraine, may repeat in 2 hours if needed. Max 3 tabs/day. Max 3 days/week., Disp: 12 tablet, Rfl: 5    Review of Systems - as per HPI, otherwise a balanced 10 systems review is negative.    OBJECTIVE:  Vitals:  /75   Pulse 63   Ht 5' 5" (1.651 m)   Wt 57.3 kg (126 lb 5.2 oz)   LMP 02/07/2018 (Approximate)   BMI 21.02 kg/m²     Physical Exam:  Constitutional: she appears well-developed and well-nourished. she is well groomed. NAD   HENT:    Head: Normocephalic and atraumatic  Eyes: Conjunctivae and EOM are normal  Musculoskeletal: Normal range of motion. No joint stiffness.   Skin: Skin is warm and dry.  Psychiatric: Mood and affect are normal    Neuro: Patient is awake, alert, and oriented to person, place, and time. Language is intact and fluent.  Recent and remote memory are intact.  Normal attention and concentration.  Facial movement is symmetric.  Gait is normal.     Review of Data:   Notes from OBGYN and Ophthalmology reviewed   Labs:  Office Visit on 01/02/2018   Component Date Value Ref Range Status    Urine Culture, Routine 01/02/2018    Final                    Value:ESCHERICHIA COLI  10,000 - 49,999 cfu/ml  No other significant isolate     Results for BLAKE WALTON (MRN [id number]) as of 2/28/2018 10:03   Ref. Range 12/11/2012 08:23 " 1/9/2013 07:30 2/8/2013 08:15 2/10/2014 08:15 10/29/2015 08:32   Cholesterol Latest Ref Range: 120 - 199 mg/dL 281 (H) 293 (H) 353 (H) 276 (H) 285 (H)   HDL Latest Ref Range: 40 - 75 mg/dL 74 81 (H) 100 (H) 83 (H) 125 (H)   LDL Cholesterol Latest Ref Range: 63.0 - 159.0 mg/dL 157.0 161.4 (H) 231.0 (H) 183.8 (H) 154.0   Total Cholesterol/HDL Ratio Latest Ref Range: 2.0 - 5.0  3.8 3.6 3.5 3.3 2.3   Triglycerides Latest Ref Range: 30 - 150 mg/dL 249 (H) 253 (H) 109 46 30     Results for BLAKE WALTON (MRN [id number]) as of 2/28/2018 10:03   Ref. Range 10/23/2017 02:08   Vit D, 25-Hydroxy Latest Ref Range: 30 - 96 ng/mL 31   TSH Latest Ref Range: 0.400 - 4.000 uIU/mL 0.550   Free T4 Latest Ref Range: 0.71 - 1.51 ng/dL 1.06     Imaging:  Results for orders placed or performed during the hospital encounter of 02/26/18   MRI Brain Without Contrast    Narrative    CLINICAL INDICATION: 37 year old F with Headache, chronic, new features or neuro deficit      TECHNIQUE: Routine MRI brain without contrast. Multiplanar multisequence MR imaging of the brain was performed without intravenous contrast.    COMPARISON: Pituitary MRI 8/24/11    FINDINGS:    Intracranial compartment:    Ventricles and sulci are normal in size for age without evidence of hydrocephalus. No extra-axial blood or fluid collections.    The brain parenchyma appears normal. No mass lesion, acute hemorrhage, edema or acute infarct.    Normal vascular flow voids are preserved.      Skull/extracranial contents (limited evaluation): Bone marrow signal intensity is normal.    Impression    No significant abnormality.      Electronically signed by: ANAHY ANNA  Date:     02/26/18  Time:    14:23    Results for orders placed or performed in visit on 08/24/11   MRI Brain W WO Contrast    Narrative    DATE OF EXAM: Aug 24 2011      MRI   0007  -  MRI BRAIN W AND WO CONTRAST:     [id number]     CLINICAL HISTORY:   253.8  30 YEAR OLD FEMALE WITH PITUITARY DISORDER       PROCEDURE COMMENT:        ICD 9 CODE(S):   ()     CPT 4 CODE(S)/MODIFIER(S):   ()     RESULTS: AXIAL FLAIR AND AXIAL T1 POST CONTRAST IMAGING OF THE WHOLE   BRAIN WITH THIN SECTION IMAGING OF THE SELLA INCLUDING SAGITTAL AND   CORONAL T1 PRE AND POST CONTRAST AND CORONAL T2 IMAGING.     ON THE LIMITED WHOLE BRAIN IMAGING THE BRAIN PARENCHYMA IS NORMAL IN   SIGNAL AND CONTOUR.  THE VENTRICLES ARE NORMAL IN SIZE AND CONFIGURATION   WITHOUT EVIDENCE FOR HYDROCEPHALUS.  THERE IS NO MIDLINE SHIFT OR MASS   EFFECT.  NO ABNORMAL PARENCHYMAL ENHANCEMENT.     SELLA -- THE PITUITARY GLAND IS NORMAL IN HEIGHT AND CONTOUR.  THE   INFUNDIBULUM IS MIDLINE WITHOUT EVIDENCE FOR THICKENING.  NO EVIDENCE FOR   HYPOENHANCING FOCUS TO SUGGEST DEFINITE MICROADENOMA.  THE SUPRASELLAR   NEUROVASCULAR STRUCTURES ARE WITHIN NORMAL LIMITS.     IMPRESSION: UNREMARKABLE MRI OF THE BRAIN AND SELLA SPECIFICALLY WITHOUT   EVIDENCE FOR PITUITARY ENLARGEMENT OR FOCAL ABNORMAL PITUITARY   ENHANCEMENT TO SUGGEST FOCAL LESION.  CLINICAL CORRELATION AND FOLLOW-UP   AS WARRANTED.        : myles  Transcribe Date/Time: Aug 24 2011 10:10A  Dictated by : ABRAM CARBALLO DO  Read On: Aug 24 2011  7:12A  Abram Carballo MD [id number]  Images were reviewed, findings were verified and document was   electronically  SIGNED BY: ABRAM CARBALLO DO On: Aug 24 2011  3:20P        Note: I have independently reviewed any/all imaging/labs/tests and agree with the report (s) as documented.  Any discrepancies will be as noted/demarcated by free text.  JOLYNN AHN 2/28/2018    Focused examination was undertaken today.     ASSESSMENT:  1. Migraine with aura and without status migrainosus, not intractable    2. Hyperlipidemia, unspecified hyperlipidemia type    3. Anxiety      PLAN:  - For migraine prevention - continue Propranolol 10 mg daily   - Supplements to consider for migraine prevention - Mag Ox 400 mg, B2 400 mg, and Co-Q10 200 mg daily   - For migraine abortive  - given Maxalt 5 mg Mlt   - For rescue - has diclofenac available   - Encouraged her to keep appointment with Psychiatry for anxiety as well as Cardiology due to history of hyperlipidemia   - Has appointment to have hormones levels checked next month   - Continue tracking headaches   - Discussed goals of therapy are to decrease the frequency, intensity, and duration of headaches  - Follow up in 3-6 months or sooner if needed     Orders Placed This Encounter    rizatriptan (MAXALT-MLT) 5 MG disintegrating tablet     Questions and concerns were sought and answered to the patient's stated verbal satisfaction.  The patient verbalizes understanding and agreement with the above stated treatment plan.       Rosa Parikh, FNP-C  [facility]   [phone]    Dr. Ramírez was available during today's encounter.

## 2018-03-05 ENCOUNTER — PATIENT MESSAGE (OUTPATIENT)
Dept: NEUROLOGY | Facility: CLINIC | Age: 38
End: 2018-03-05

## 2018-03-07 NOTE — PROGRESS NOTES
Subjective:   Patient ID:  Georgie Wetzel is a 37 y.o. female who presents for follow-up of Lipids    HPI:The patient is here for dyslipidemia-I am dad's long-term doc.Having migraines and wants to make sure she can take these meds.      The patient has no chest pain, SOB, TIA, palpitations, syncope or pre-syncope.Patient currently exercises many times per week.        Review of Systems   Constitution: Negative for chills, decreased appetite, diaphoresis, fever, weakness, malaise/fatigue, night sweats, weight gain and weight loss.   HENT: Negative for congestion, hoarse voice, nosebleeds, sore throat and tinnitus.    Eyes: Negative for blurred vision, double vision, vision loss in left eye, vision loss in right eye, visual disturbance and visual halos.   Cardiovascular: Negative for chest pain, claudication, cyanosis, dyspnea on exertion, irregular heartbeat, leg swelling, near-syncope, orthopnea, palpitations, paroxysmal nocturnal dyspnea and syncope.   Respiratory: Negative for cough, hemoptysis, shortness of breath, sleep disturbances due to breathing, snoring, sputum production and wheezing.    Endocrine: Negative for cold intolerance, heat intolerance, polydipsia, polyphagia and polyuria.   Hematologic/Lymphatic: Negative for adenopathy and bleeding problem. Does not bruise/bleed easily.   Skin: Negative for color change, dry skin, flushing, itching, nail changes, poor wound healing, rash, skin cancer, suspicious lesions and unusual hair distribution.   Musculoskeletal: Negative for arthritis, back pain, falls, gout, joint pain, joint swelling, muscle cramps, muscle weakness, myalgias and stiffness.   Gastrointestinal: Negative for abdominal pain, anorexia, change in bowel habit, constipation, diarrhea, dysphagia, heartburn, hematemesis, hematochezia, melena and vomiting.   Genitourinary: Negative for decreased libido, dysuria, hematuria, hesitancy and urgency.   Neurological: Negative for excessive daytime  "sleepiness, dizziness, focal weakness, headaches, light-headedness, loss of balance, numbness, paresthesias, seizures, sensory change, tremors and vertigo.   Psychiatric/Behavioral: Negative for altered mental status, depression, hallucinations, memory loss, substance abuse and suicidal ideas. The patient does not have insomnia and is not nervous/anxious.    Allergic/Immunologic: Negative for environmental allergies and hives.       Objective: /87 (BP Location: Left arm, Patient Position: Sitting, BP Method: Small (Automatic))   Pulse (!) 59   Ht 5' 5" (1.651 m)   Wt 58.3 kg (128 lb 8.5 oz)   LMP 02/07/2018 (Approximate)   BMI 21.39 kg/m²      Physical Exam   Constitutional: She is oriented to person, place, and time. She appears well-developed and well-nourished.   HENT:   Head: Normocephalic.   Eyes: EOM are normal. Pupils are equal, round, and reactive to light.   Neck: Normal range of motion. Normal carotid pulses, no hepatojugular reflux and no JVD present. Carotid bruit is not present. No thyromegaly present.   Cardiovascular: Normal rate, regular rhythm, normal heart sounds and intact distal pulses.  Exam reveals no gallop and no friction rub.    No murmur heard.  Pulmonary/Chest: Effort normal and breath sounds normal. No tachypnea. No respiratory distress. She has no wheezes. She has no rales. She exhibits no tenderness.   Abdominal: Soft. Bowel sounds are normal. She exhibits no distension and no mass. There is no tenderness. There is no rebound and no guarding.   Musculoskeletal: Normal range of motion. She exhibits no edema or tenderness.   Lymphadenopathy:     She has no cervical adenopathy.   Neurological: She is alert and oriented to person, place, and time. No cranial nerve deficit. Coordination normal.   Skin: Skin is warm. No rash noted. No erythema.   Psychiatric: She has a normal mood and affect. Her behavior is normal. Judgment and thought content normal.       Assessment:     1. " Mixed hyperlipidemia    2. Gastroesophageal reflux disease without esophagitis    3. Migraine with aura and with status migrainosus, not intractable        Plan:   Discussed diet , achieving and maintaining ideal body weight, and exercise.   We reviewed meds in detail.  Reassured-Discussed goals options , plan        Georgie was seen today for hyperlipidemia.    Diagnoses and all orders for this visit:    Mixed hyperlipidemia  -     Lipid panel; Standing  -     Comprehensive metabolic panel; Standing  -     TSH; Standing    Gastroesophageal reflux disease without esophagitis    Migraine with aura and with status migrainosus, not intractable  -     Lipid panel; Standing  -     Comprehensive metabolic panel; Standing  -     TSH; Standing    Other orders  -     magnesium 30 mg Tab; Take by mouth once.  -     co-enzyme Q-10 50 mg capsule; Take 50 mg by mouth once daily.  -     cyanocobalamin (VITAMIN B-12) 1000 MCG tablet; Take 100 mcg by mouth once daily.            Follow-up in about 18 months (around 9/8/2019) for with labs;labs tomorrow with OB ones planned.

## 2018-03-08 ENCOUNTER — OFFICE VISIT (OUTPATIENT)
Dept: CARDIOLOGY | Facility: CLINIC | Age: 38
End: 2018-03-08
Payer: COMMERCIAL

## 2018-03-08 VITALS
BODY MASS INDEX: 21.41 KG/M2 | SYSTOLIC BLOOD PRESSURE: 127 MMHG | HEIGHT: 65 IN | HEART RATE: 59 BPM | DIASTOLIC BLOOD PRESSURE: 87 MMHG | WEIGHT: 128.5 LBS

## 2018-03-08 DIAGNOSIS — K21.9 GASTROESOPHAGEAL REFLUX DISEASE WITHOUT ESOPHAGITIS: ICD-10-CM

## 2018-03-08 DIAGNOSIS — E78.2 MIXED HYPERLIPIDEMIA: Primary | ICD-10-CM

## 2018-03-08 DIAGNOSIS — G43.101 MIGRAINE WITH AURA AND WITH STATUS MIGRAINOSUS, NOT INTRACTABLE: ICD-10-CM

## 2018-03-08 PROBLEM — G43.109 MIGRAINE WITH AURA: Status: ACTIVE | Noted: 2018-03-08

## 2018-03-08 PROCEDURE — 99999 PR PBB SHADOW E&M-EST. PATIENT-LVL IV: CPT | Mod: PBBFAC,,, | Performed by: INTERNAL MEDICINE

## 2018-03-08 PROCEDURE — 99202 OFFICE O/P NEW SF 15 MIN: CPT | Mod: S$GLB,,, | Performed by: INTERNAL MEDICINE

## 2018-03-08 RX ORDER — ASPIRIN 325 MG
50 TABLET, DELAYED RELEASE (ENTERIC COATED) ORAL DAILY
COMMUNITY

## 2018-03-08 RX ORDER — LANOLIN ALCOHOL/MO/W.PET/CERES
100 CREAM (GRAM) TOPICAL DAILY
COMMUNITY
End: 2018-03-13

## 2018-03-08 NOTE — PATIENT INSTRUCTIONS
Discussed diet , achieving and maintaining ideal body weight, and exercise.   We reviewed meds in detail.  Reassured-Discussed goals options , plan

## 2018-03-09 ENCOUNTER — PATIENT MESSAGE (OUTPATIENT)
Dept: OBSTETRICS AND GYNECOLOGY | Facility: CLINIC | Age: 38
End: 2018-03-09

## 2018-03-09 ENCOUNTER — PATIENT MESSAGE (OUTPATIENT)
Dept: CARDIOLOGY | Facility: CLINIC | Age: 38
End: 2018-03-09

## 2018-03-09 ENCOUNTER — LAB VISIT (OUTPATIENT)
Dept: LAB | Facility: OTHER | Age: 38
End: 2018-03-09
Attending: OBSTETRICS & GYNECOLOGY
Payer: COMMERCIAL

## 2018-03-09 DIAGNOSIS — E78.2 MIXED HYPERLIPIDEMIA: ICD-10-CM

## 2018-03-09 DIAGNOSIS — G43.101 MIGRAINE WITH AURA AND WITH STATUS MIGRAINOSUS, NOT INTRACTABLE: ICD-10-CM

## 2018-03-09 DIAGNOSIS — E28.39 DECREASED ESTROGEN LEVEL: ICD-10-CM

## 2018-03-09 LAB
ALBUMIN SERPL BCP-MCNC: 3.8 G/DL
ALP SERPL-CCNC: 43 U/L
ALT SERPL W/O P-5'-P-CCNC: 16 U/L
ANION GAP SERPL CALC-SCNC: 9 MMOL/L
AST SERPL-CCNC: 15 U/L
BILIRUB SERPL-MCNC: 0.4 MG/DL
BUN SERPL-MCNC: 14 MG/DL
CALCIUM SERPL-MCNC: 9.3 MG/DL
CHLORIDE SERPL-SCNC: 106 MMOL/L
CHOLEST SERPL-MCNC: 263 MG/DL
CHOLEST/HDLC SERPL: 2.4 {RATIO}
CO2 SERPL-SCNC: 25 MMOL/L
CREAT SERPL-MCNC: 0.7 MG/DL
EST. GFR  (AFRICAN AMERICAN): >60 ML/MIN/1.73 M^2
EST. GFR  (NON AFRICAN AMERICAN): >60 ML/MIN/1.73 M^2
ESTRADIOL SERPL-MCNC: <10 PG/ML
FSH SERPL-ACNC: 24.7 MIU/ML
GLUCOSE SERPL-MCNC: 90 MG/DL
HDLC SERPL-MCNC: 109 MG/DL
HDLC SERPL: 41.4 %
LDLC SERPL CALC-MCNC: 143.4 MG/DL
LH SERPL-ACNC: 9.3 MIU/ML
NONHDLC SERPL-MCNC: 154 MG/DL
POTASSIUM SERPL-SCNC: 4.9 MMOL/L
PROLACTIN SERPL IA-MCNC: 10.9 NG/ML
PROT SERPL-MCNC: 6.7 G/DL
SODIUM SERPL-SCNC: 140 MMOL/L
T4 FREE SERPL-MCNC: 1 NG/DL
TRIGL SERPL-MCNC: 53 MG/DL
TSH SERPL DL<=0.005 MIU/L-ACNC: 0.4 UIU/ML
TSH SERPL DL<=0.005 MIU/L-ACNC: 0.4 UIU/ML

## 2018-03-09 PROCEDURE — 82670 ASSAY OF TOTAL ESTRADIOL: CPT

## 2018-03-09 PROCEDURE — 84146 ASSAY OF PROLACTIN: CPT

## 2018-03-09 PROCEDURE — 83001 ASSAY OF GONADOTROPIN (FSH): CPT

## 2018-03-09 PROCEDURE — 83002 ASSAY OF GONADOTROPIN (LH): CPT

## 2018-03-09 PROCEDURE — 36415 COLL VENOUS BLD VENIPUNCTURE: CPT

## 2018-03-09 PROCEDURE — 80061 LIPID PANEL: CPT

## 2018-03-09 PROCEDURE — 80053 COMPREHEN METABOLIC PANEL: CPT

## 2018-03-09 PROCEDURE — 84443 ASSAY THYROID STIM HORMONE: CPT

## 2018-03-09 PROCEDURE — 84439 ASSAY OF FREE THYROXINE: CPT

## 2018-03-09 NOTE — TELEPHONE ENCOUNTER
Called patient:    Discussed results of labs:    (drawn during hormone free interval of OCPs)    E2 <10,  FSH 24.7,  LH 9.3  Prolactin 10.9  TSH .396,  Free T4 1.00    REC:  Continue on OCPs, recheck FSH / LH in 3 months.

## 2018-03-13 ENCOUNTER — OFFICE VISIT (OUTPATIENT)
Dept: INTERNAL MEDICINE | Facility: CLINIC | Age: 38
End: 2018-03-13
Payer: COMMERCIAL

## 2018-03-13 VITALS
SYSTOLIC BLOOD PRESSURE: 98 MMHG | BODY MASS INDEX: 21.02 KG/M2 | RESPIRATION RATE: 16 BRPM | DIASTOLIC BLOOD PRESSURE: 76 MMHG | WEIGHT: 126.19 LBS | TEMPERATURE: 98 F | HEIGHT: 65 IN | HEART RATE: 68 BPM

## 2018-03-13 DIAGNOSIS — F41.0 PANIC ATTACK: ICD-10-CM

## 2018-03-13 DIAGNOSIS — E05.90 SUBCLINICAL HYPERTHYROIDISM: ICD-10-CM

## 2018-03-13 DIAGNOSIS — G43.101 MIGRAINE WITH AURA AND WITH STATUS MIGRAINOSUS, NOT INTRACTABLE: ICD-10-CM

## 2018-03-13 DIAGNOSIS — F41.1 GAD (GENERALIZED ANXIETY DISORDER): ICD-10-CM

## 2018-03-13 DIAGNOSIS — Z00.00 ANNUAL PHYSICAL EXAM: Primary | ICD-10-CM

## 2018-03-13 DIAGNOSIS — E78.2 MIXED HYPERLIPIDEMIA: ICD-10-CM

## 2018-03-13 PROCEDURE — 99395 PREV VISIT EST AGE 18-39: CPT | Mod: S$GLB,,, | Performed by: INTERNAL MEDICINE

## 2018-03-13 PROCEDURE — 99999 PR PBB SHADOW E&M-EST. PATIENT-LVL III: CPT | Mod: PBBFAC,,, | Performed by: INTERNAL MEDICINE

## 2018-03-13 RX ORDER — RIBOFLAVIN (VITAMIN B2) 400 MG
1 TABLET ORAL DAILY
COMMUNITY

## 2018-03-13 RX ORDER — ALPRAZOLAM 0.25 MG/1
0.12 TABLET ORAL DAILY PRN
Qty: 30 TABLET | Refills: 0 | Status: SHIPPED | OUTPATIENT
Start: 2018-03-13 | End: 2018-07-02 | Stop reason: SDUPTHER

## 2018-03-13 NOTE — PROGRESS NOTES
INTERNAL MEDICINE INITIAL VISIT NOTE    CHIEF COMPLAINT     Chief Complaint   Patient presents with    [facility]    Annual Exam     HPI     Georgie Wetzel is a 37 y.o. C female who presents FOR ANNUAL AND TO [facility].    HLD - follows w/ Dr. 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 Stopped having migraines since 15 y/o. Mom passed in 2008. Was doing well until 1/2016 when her migraine returned - associated w/ painful period that month.   6/2017, had severe constipation s/p miralax and developed intense pain in the RLQ. CT A/P w/ 1cm R hepatic lobe, mild R pelvocaliectasis and ureterectasis w/o obstructing lesion, possible diverticulitis vs colitis.   Saw Rosa Parikh, NP 2/28/18, started on propranolol 10mg daily, B2, MgOx and CoQ10. maxalt 5mg prn for abortive and diclofenac prn.  MRI brain 2/26/18 WNL.   Is chronically on microgestin OCP (last saw Dr. Evans 2/20/18.    Lots of anxiety. Works as .   Was on zoloft a long time ago (felt zaps when she was titrated on or off of the zoloft). Given zanaflex 2mg prn.   Not depressed. Occasional panic attacks - occur twice a mo.    Has appt scheduled w/ psychiatry 4/3/18.  Reports does not sleep well at night - nervous about being by herself, etc.    3 UTIs in the last 1.5 yrs - if she doesn't urinate/clean herself after intercourse, she may get UTI.     Exercises about 2 hours a day - classes.     TSH mildly low. Normal FT4.    Past Medical History:  Past Medical History:   Diagnosis Date    Acne     Depression     treated with medication last use 2010, situational     GERD (gastroesophageal reflux disease)     High cholesterol     Retinal detachment        Past Surgical History:  Past Surgical History:   Procedure Laterality Date     Retinal Tear OS  2005      COLONOSCOPY N/A 8/25/2017    Procedure: COLONOSCOPY;  Surgeon: Vahid  MD Shane;  [facility] ([address] [place] [address]);  Service: Endoscopy;  Laterality: N/A;  after Aug 13 for hx of diverticulitis    CRYOTHERAPY      RETINAL DETACHMENT SURGERY      RPK  OU Dr Munoz    7/2008    TONSILLECTOMY, ADENOIDECTOMY         Allergies:  Review of patient's allergies indicates:   Allergen Reactions    No known drug allergies        Home Medications:  Prior to Admission medications    Medication Sig Start Date End Date Taking? Authorizing Provider   ACZONE 5 % topical gel APPLY A THIN FILM TO FACE IN THE MORNING 11/17/16  Yes Historical Provider, MD   co-enzyme Q-10 50 mg capsule Take 50 mg by mouth once daily.   Yes Historical Provider, MD   diclofenac (VOLTAREN) 75 MG EC tablet Take 1 tab by mouth every 8-10 hours as needed for headaches. No more than 2 tabs/day, no more than 3 days/week. 12/6/17  Yes RUPAL Montaño   LACTOBACILLUS ACIDOPHILUS (ACIDOPHILUS ORAL) Take by mouth.   Yes Historical Provider, MD   MAGNESIUM ORAL Take 500 mg by mouth once.    Yes Historical Provider, MD   norethindrone-ethinyl estradiol (MICROGESTIN 1/20) 1-20 mg-mcg per tablet Take 1 tablet by mouth once daily. 11/21/17  Yes Thom Evans MD   omeprazole (PRILOSEC OTC) 20 MG tablet Take 40 mg by mouth once daily.    Yes Historical Provider, MD   propranolol (INDERAL) 10 MG tablet 1 tab daily x 1 week, then 1 tabs twice daily x 1 week, then 1 tab 3x/day. 1/29/18  Yes RUPAL Montaño   riboflavin, vitamin B2, 400 mg Tab Take 1 capsule by mouth once daily.   Yes Historical Provider, MD   rizatriptan (MAXALT-MLT) 5 MG disintegrating tablet Dissolve 1 tab on tongue at onset of migraine, may repeat in 2 hours if needed. Max 3 tabs/day. Max 3 days/week. 2/28/18  Yes RUPAL Montaño   spironolactone (ALDACTONE) 100 MG tablet Take 100 mg by mouth once daily.  1/10/18  Yes Historical Provider, MD   SS 10-2 10-2 % Clsr USE TO WASH FACE EVERY DAY - pt using once weekly 2/23/17  Yes Historical Provider,  "MD   tiZANidine (ZANAFLEX) 2 MG tablet 1-2 tabs around bedtime (muscle relaxer).  No driving with this medication. No alcohol with med. 12/6/17  Yes RUPAL Montaño   ZIANA gel APPLY A THIN FILM TO FACE AT BEDTIME 11/17/16  Yes Historical Provider, MD   cyanocobalamin (VITAMIN B-12) 1000 MCG tablet Take 100 mcg by mouth once daily.  3/13/18  Historical Provider, MD   hydrOXYzine pamoate (VISTARIL) 50 MG Cap Take 1 capsule one hour before MRI, may repeat 30-45 minutes later if needed. 1/31/18 3/13/18  RUPAL Montaño       Family History:  Family History   Problem Relation Age of Onset    Acne Brother     Ovarian cancer Mother     Diabetes Maternal Grandmother     Glaucoma Maternal Grandfather     Cataracts Maternal Grandfather     Stroke Paternal Grandmother     Scleroderma Paternal Grandmother     Atrial fibrillation Father     Hyperlipidemia Father     Melanoma Neg Hx     Breast cancer Neg Hx        Social History:  Social History   Substance Use Topics    Smoking status: Never Smoker    Smokeless tobacco: Never Used    Alcohol use Yes      Comment: Social use of alcohol monthly        Review of Systems:  Review of Systems Comprehensive review of systems otherwise negative. See history/subjective section for more details.    Health Maintainence:   Td - right after Hayley  Flu - 1/2/18  Pap 11/21/17    PHYSICAL EXAM     BP 98/76   Pulse 68   Temp 97.8 °F (36.6 °C) (Oral)   Resp 16   Ht 5' 5" (1.651 m)   Wt 57.2 kg (126 lb 3.4 oz)   LMP 03/06/2018 (Within Days)   BMI 21.00 kg/m²     GEN - A+OX4, NAD   HEENT - PERRL, EOMI, OP clear. MMM.   Neck - No thyromegaly or cervical LAD. No thyroid masses felt.  CV - RRR, no m/r   Chest - CTAB, no wheezing or rhonchi  Abd - S/NT/ND/+BS.   Ext - 2+BDP and radial pulses. No LE edema.   Neuro - PERRL, EOMI, no nystagmus, eyebrow raise, facial sensation, hearing, m of mastication, smile, palatal raise, shoulder shrug, tongue protrusion " symmetric and intact. 5/5 BUE and BLE strength. Sensation to light touch intact throughout. 2+ DTRs. Normal gait.   MSK - No spinal tenderness to palpation. Normal gait.   Skin - No rash.    LABS     Previous labs reviewed.    ASSESSMENT/PLAN     Georgie Wetzel is a 37 y.o. female with  Georgie was seen today for establish care and annual exam.    Diagnoses and all orders for this visit:    Annual physical exam    Migraine with aura and with status migrainosus, not intractable - cont propranolol.     Mixed hyperlipidemia - f/u Dr. Kieran SHEA (generalized anxiety disorder) - panic attacks about twice a mo. Trial of xanax 1/2 pill prn. F/u w/ psychiatry as scheduled.   -     ALPRAZolam (XANAX) 0.25 MG tablet; Take 0.5 tablets (0.125 mg total) by mouth daily as needed for Anxiety.    Panic attack  -     ALPRAZolam (XANAX) 0.25 MG tablet; Take 0.5 tablets (0.125 mg total) by mouth daily as needed for Anxiety.    Subclinical hyperthyroidism - repeat TFT in 4-6 weeks.  -     TSH; Future; Expected date: 03/13/2018  -     T4, free; Future; Expected date: 03/13/2018  -     T3; Future; Expected date: 03/13/2018    Other orders  -     riboflavin, vitamin B2, 400 mg Tab; Take 1 capsule by mouth once daily.    RTC in 3 months, sooner if needed and depending on labs.    Christiane Lomeli MD  Department of Internal Medicine - [facility]  2:10 PM

## 2018-05-18 ENCOUNTER — PATIENT MESSAGE (OUTPATIENT)
Dept: INTERNAL MEDICINE | Facility: CLINIC | Age: 38
End: 2018-05-18

## 2018-06-07 ENCOUNTER — OFFICE VISIT (OUTPATIENT)
Dept: OTOLARYNGOLOGY | Facility: CLINIC | Age: 38
End: 2018-06-07
Payer: COMMERCIAL

## 2018-06-07 VITALS
BODY MASS INDEX: 21.71 KG/M2 | TEMPERATURE: 98 F | WEIGHT: 130.31 LBS | HEART RATE: 77 BPM | HEIGHT: 65 IN | DIASTOLIC BLOOD PRESSURE: 69 MMHG | SYSTOLIC BLOOD PRESSURE: 121 MMHG

## 2018-06-07 DIAGNOSIS — H61.23 BILATERAL IMPACTED CERUMEN: Primary | ICD-10-CM

## 2018-06-07 PROCEDURE — 69210 REMOVE IMPACTED EAR WAX UNI: CPT | Mod: S$GLB,,, | Performed by: OTOLARYNGOLOGY

## 2018-06-07 PROCEDURE — 99499 UNLISTED E&M SERVICE: CPT | Mod: S$GLB,,, | Performed by: OTOLARYNGOLOGY

## 2018-06-07 PROCEDURE — 99999 PR PBB SHADOW E&M-EST. PATIENT-LVL III: CPT | Mod: PBBFAC,,, | Performed by: OTOLARYNGOLOGY

## 2018-06-07 RX ORDER — ONDANSETRON 4 MG/1
TABLET, ORALLY DISINTEGRATING ORAL
Refills: 3 | COMMUNITY
Start: 2018-03-20

## 2018-06-07 RX ORDER — ESCITALOPRAM OXALATE 10 MG/1
TABLET ORAL
COMMUNITY
Start: 2018-06-04 | End: 2019-02-18

## 2018-06-07 NOTE — PATIENT INSTRUCTIONS
"Cerumen "balls" extracted from both dry eacs  Pt. may lubricate canals with mineral oil or baby oil or white vinegar prn    "

## 2018-06-07 NOTE — PROGRESS NOTES
CC; Ear wax impactions  HPI:Ms. Wetzel is a 37-year-old  female wholast visited me 1/8/15 for extraction of dry cerumen impactions from both ear canals.  She was advised to have a dermatologist evaluate/follow  a preauricular nevus.  She is here for an ear cleaning procedure again today.  She was examined by her PCP in January of this year for a sinus infection.  Physical exam indicated evidence of cerumen impactions in both ear canals.    Family history: High blood pressure, high cholesterol, stroke, glaucoma, diabetes, ovarian cancer  No known ALLERGIES  Habits: Patient denies tobacco use; 1 alcoholic drink per day and 1 cup of coffee per day  Occupation: Atty.  ROS questionnaire indicates positive responses to acid indigestion and reflux symptoms as well as depression/anxiety.    Past Medical History:   Diagnosis Date    Acne     Depression     treated with medication last use 2010, situational     GERD (gastroesophageal reflux disease)     High cholesterol     Retinal detachment      Past Surgical History:   Procedure Laterality Date     Retinal Tear OS  2005      COLONOSCOPY N/A 8/25/2017    Procedure: COLONOSCOPY;  Surgeon: Vahid Lynn MD;  Location: [address];  Service: Endoscopy;  Laterality: N/A;  after Aug 13 for hx of diverticulitis    CRYOTHERAPY      RETINAL DETACHMENT SURGERY      RPK  OU Dr Munoz    7/2008    TONSILLECTOMY, ADENOIDECTOMY       Current Outpatient Prescriptions on File Prior to Visit   Medication Sig Dispense Refill    ACZONE 5 % topical gel APPLY A THIN FILM TO FACE IN THE MORNING  6    co-enzyme Q-10 50 mg capsule Take 50 mg by mouth once daily.      diclofenac (VOLTAREN) 75 MG EC tablet Take 1 tab by mouth every 8-10 hours as needed for headaches. No more than 2 tabs/day, no more than 3 days/week. 40 tablet 5    LACTOBACILLUS ACIDOPHILUS (ACIDOPHILUS ORAL) Take by mouth.      MAGNESIUM ORAL Take 500 mg by mouth once.       norethindrone-ethinyl  "estradiol (MICROGESTIN 1/20) 1-20 mg-mcg per tablet Take 1 tablet by mouth once daily. 21 tablet 12    omeprazole (PRILOSEC OTC) 20 MG tablet Take 40 mg by mouth once daily.       riboflavin, vitamin B2, 400 mg Tab Take 1 capsule by mouth once daily.      rizatriptan (MAXALT-MLT) 5 MG disintegrating tablet Dissolve 1 tab on tongue at onset of migraine, may repeat in 2 hours if needed. Max 3 tabs/day. Max 3 days/week. 12 tablet 5    spironolactone (ALDACTONE) 100 MG tablet Take 100 mg by mouth once daily.       SS 10-2 10-2 % Clsr USE TO WASH FACE EVERY DAY - pt using once weekly  6    tiZANidine (ZANAFLEX) 2 MG tablet 1-2 tabs around bedtime (muscle relaxer).  No driving with this medication. No alcohol with med. 60 tablet 5    ZIANA gel APPLY A THIN FILM TO FACE AT BEDTIME  6    [DISCONTINUED] propranolol (INDERAL) 10 MG tablet 1 tab daily x 1 week, then 1 tabs twice daily x 1 week, then 1 tab 3x/day. 90 tablet 2    ALPRAZolam (XANAX) 0.25 MG tablet Take 0.5 tablets (0.125 mg total) by mouth daily as needed for Anxiety. 30 tablet 0     No current facility-administered medications on file prior to visit.          PE:/60 P 77 T 97.7  Gen.:  Alert and oriented lady in no acute distress  Both ears are examined under the miroscope in the microproedure room.    PROCEDURES: A cerumen "ball" is removed from the for the right ear canal with an angled pick.  A cerumen "ball" is removed from for the left ear canal an angled pick.    Both eardrums are clear in both middle ear spaces are well aerated.        DIAGNOSIS:     ICD-10-CM ICD-9-CM    1. Bilateral impacted cerumen H61.23 380.4      2.      Left preauricular raised nevus    PLAN: Cerumen "balls" extracted from both dry eacs  Pt. may lubricate canals with mineral oil or baby oil or white vinegar prn      "

## 2018-06-29 ENCOUNTER — PATIENT MESSAGE (OUTPATIENT)
Dept: INTERNAL MEDICINE | Facility: CLINIC | Age: 38
End: 2018-06-29

## 2018-06-29 DIAGNOSIS — F41.1 GAD (GENERALIZED ANXIETY DISORDER): ICD-10-CM

## 2018-06-29 DIAGNOSIS — F41.0 PANIC ATTACK: ICD-10-CM

## 2018-07-02 RX ORDER — ALPRAZOLAM 0.25 MG/1
0.12 TABLET ORAL DAILY PRN
Qty: 30 TABLET | Refills: 0 | Status: SHIPPED | OUTPATIENT
Start: 2018-07-02 | End: 2019-02-18

## 2018-07-05 ENCOUNTER — PATIENT MESSAGE (OUTPATIENT)
Dept: INTERNAL MEDICINE | Facility: CLINIC | Age: 38
End: 2018-07-05

## 2018-07-11 ENCOUNTER — PATIENT MESSAGE (OUTPATIENT)
Dept: INTERNAL MEDICINE | Facility: CLINIC | Age: 38
End: 2018-07-11

## 2018-07-12 ENCOUNTER — PATIENT MESSAGE (OUTPATIENT)
Dept: INTERNAL MEDICINE | Facility: CLINIC | Age: 38
End: 2018-07-12

## 2018-07-16 ENCOUNTER — LAB VISIT (OUTPATIENT)
Dept: LAB | Facility: HOSPITAL | Age: 38
End: 2018-07-16
Attending: INTERNAL MEDICINE
Payer: COMMERCIAL

## 2018-07-16 ENCOUNTER — OFFICE VISIT (OUTPATIENT)
Dept: INTERNAL MEDICINE | Facility: CLINIC | Age: 38
End: 2018-07-16
Payer: COMMERCIAL

## 2018-07-16 VITALS
HEIGHT: 65 IN | HEART RATE: 70 BPM | DIASTOLIC BLOOD PRESSURE: 74 MMHG | SYSTOLIC BLOOD PRESSURE: 112 MMHG | BODY MASS INDEX: 22.22 KG/M2 | WEIGHT: 133.38 LBS

## 2018-07-16 DIAGNOSIS — K21.9 GASTROESOPHAGEAL REFLUX DISEASE WITHOUT ESOPHAGITIS: ICD-10-CM

## 2018-07-16 DIAGNOSIS — F41.1 GAD (GENERALIZED ANXIETY DISORDER): ICD-10-CM

## 2018-07-16 DIAGNOSIS — E05.90 SUBCLINICAL HYPERTHYROIDISM: ICD-10-CM

## 2018-07-16 DIAGNOSIS — G43.101 MIGRAINE WITH AURA AND WITH STATUS MIGRAINOSUS, NOT INTRACTABLE: Primary | ICD-10-CM

## 2018-07-16 DIAGNOSIS — F41.0 PANIC ATTACKS: ICD-10-CM

## 2018-07-16 LAB
T3 SERPL-MCNC: 109 NG/DL
T4 FREE SERPL-MCNC: 0.94 NG/DL
TSH SERPL DL<=0.005 MIU/L-ACNC: 0.63 UIU/ML

## 2018-07-16 PROCEDURE — 99999 PR PBB SHADOW E&M-EST. PATIENT-LVL IV: CPT | Mod: PBBFAC,,, | Performed by: PHYSICIAN ASSISTANT

## 2018-07-16 PROCEDURE — 84439 ASSAY OF FREE THYROXINE: CPT

## 2018-07-16 PROCEDURE — 36415 COLL VENOUS BLD VENIPUNCTURE: CPT

## 2018-07-16 PROCEDURE — 84443 ASSAY THYROID STIM HORMONE: CPT

## 2018-07-16 PROCEDURE — 99214 OFFICE O/P EST MOD 30 MIN: CPT | Mod: S$GLB,,, | Performed by: PHYSICIAN ASSISTANT

## 2018-07-16 PROCEDURE — 84480 ASSAY TRIIODOTHYRONINE (T3): CPT

## 2018-07-16 NOTE — PROGRESS NOTES
Subjective:       Patient ID: Georgie Wetzel is a 37 y.o. female.        Chief Complaint: Follow-up    Georgie Wetzel is an established patient of Christiane Lomeli MD here today for f/u visit.    Anxiety: started taking lexapro 3/2018 and noticed a significant improvement in anxiety and panic attacks, also seeing a therapist which has really helped, initially with weekly visits but now down to prn, uses xanax infrequently, 1-2 times/week at most, usually only 1-2 times/month, notes some side effects with lexapro (slightly more lethargic and some sexual side effects), side effects are tolerable currently, she tried coming off lexapro (just stopped taking a few weeks ago), felt terrible, resumed medication 4 weeks ago, importance of tapering off medication should she decide to stop discussed today, overall she has desire to come off the medication generally as she does not like taking a daily medication, discussed treatment for 6-12 months prior to coming off of it.    Migraines: migraine 10/2016 and 1/2018, saw neurology here and outside [facility], no further migraines since 1/2018.             Review of Systems   Constitutional: Negative for chills, diaphoresis, fatigue and fever.   HENT: Negative for congestion and sore throat.    Eyes: Negative for visual disturbance.   Respiratory: Negative for cough, chest tightness and shortness of breath.    Cardiovascular: Negative for chest pain, palpitations and leg swelling.   Gastrointestinal: Negative for abdominal pain, blood in stool, constipation, diarrhea, nausea and vomiting.   Genitourinary: Negative for dysuria, frequency, hematuria and urgency.   Musculoskeletal: Negative for arthralgias and back pain.   Skin: Negative for rash.   Neurological: Negative for dizziness, syncope, weakness and headaches.   Psychiatric/Behavioral: Negative for dysphoric mood and sleep disturbance. The patient is nervous/anxious.        Objective:      Physical Exam   Constitutional: She  appears well-developed and well-nourished. No distress.   HENT:   Head: Normocephalic and atraumatic.   Right Ear: Tympanic membrane and external ear normal.   Left Ear: Tympanic membrane and external ear normal.   Nose: Nose normal.   Mouth/Throat: Oropharynx is clear and moist.   Eyes: Conjunctivae are normal. Pupils are equal, round, and reactive to light.   Cardiovascular: Normal rate, regular rhythm and normal heart sounds.  Exam reveals no gallop.    No murmur heard.  Pulmonary/Chest: Effort normal and breath sounds normal. No respiratory distress.   Abdominal: Soft. Normal appearance. There is no tenderness. There is no rebound, no guarding and no CVA tenderness.   Musculoskeletal: She exhibits no edema.   Neurological: She is alert.   Skin: Skin is warm and dry. She is not diaphoretic.   Psychiatric: She has a normal mood and affect.   Nursing note and vitals reviewed.      Assessment:       1. Migraine with aura and with status migrainosus, not intractable    2. Gastroesophageal reflux disease without esophagitis    3. SHABBIR (generalized anxiety disorder)    4. Panic attacks    5. Subclinical hyperthyroidism        Plan:       Georgie was seen today for follow-up.    Diagnoses and all orders for this visit:    Migraine with aura and with status migrainosus, not intractable: no further migraines since 1/2018, follows with outside neurologist    Gastroesophageal reflux disease without esophagitis: stable and controlled with prilosec daily    SHABBIR (generalized anxiety disorder): continue with outside therapist as needed, restarted lexapro 10 mg 4 weeks ago, to continue lexapro 10 mg daily for the next 2-3 months, then try decreasing down to 1/2 tablet daily, continue at this dose until f/u in 4 months with either Dr. Lomeli or me    Panic attacks: uses xanax a few times a month, better control with daily lexapro    Subclinical hyperthyroidism: orders already in per Dr. Lomeli for repeat thyroid testing, check today    Pt  "has been given instructions populated from [email or web address] database and has verbalized understanding of the after visit summary and information contained wherein.    Follow up with a primary care provider. May go to ER for acute shortness of breath, lightheadedness, fever, or any other emergent complaints or changes in condition.    "This note will be shared with the patient"    No future appointments.            "

## 2018-10-22 RX ORDER — NORETHINDRONE ACETATE AND ETHINYL ESTRADIOL .02; 1 MG/1; MG/1
1 TABLET ORAL DAILY
Qty: 21 TABLET | Refills: 6 | Status: SHIPPED | OUTPATIENT
Start: 2018-10-22 | End: 2019-01-22 | Stop reason: SDUPTHER

## 2018-11-11 ENCOUNTER — OFFICE VISIT (OUTPATIENT)
Dept: URGENT CARE | Facility: CLINIC | Age: 38
End: 2018-11-11
Payer: COMMERCIAL

## 2018-11-11 VITALS
HEART RATE: 79 BPM | RESPIRATION RATE: 18 BRPM | DIASTOLIC BLOOD PRESSURE: 79 MMHG | OXYGEN SATURATION: 97 % | BODY MASS INDEX: 22.49 KG/M2 | TEMPERATURE: 98 F | HEIGHT: 65 IN | SYSTOLIC BLOOD PRESSURE: 124 MMHG | WEIGHT: 135 LBS

## 2018-11-11 DIAGNOSIS — J02.0 STREP PHARYNGITIS: Primary | ICD-10-CM

## 2018-11-11 DIAGNOSIS — J02.9 SORE THROAT: ICD-10-CM

## 2018-11-11 LAB
CTP QC/QA: YES
S PYO RRNA THROAT QL PROBE: POSITIVE

## 2018-11-11 PROCEDURE — 99214 OFFICE O/P EST MOD 30 MIN: CPT | Mod: S$GLB,,, | Performed by: NURSE PRACTITIONER

## 2018-11-11 PROCEDURE — 87880 STREP A ASSAY W/OPTIC: CPT | Mod: QW,S$GLB,, | Performed by: NURSE PRACTITIONER

## 2018-11-11 RX ORDER — AMOXICILLIN 500 MG/1
500 CAPSULE ORAL EVERY 12 HOURS
Qty: 20 CAPSULE | Refills: 0 | Status: SHIPPED | OUTPATIENT
Start: 2018-11-11 | End: 2018-11-21

## 2018-11-11 NOTE — PATIENT INSTRUCTIONS
Pharyngitis: Strep (Confirmed)    You have had a positive test for strep throat. Strep throat is a contagious illness. It is spread by coughing, kissing or by touching others after touching your mouth or nose. Symptoms include throat pain that is worse with swallowing, aching all over, headache, and fever. It is treated with antibiotic medicine. This should help you start to feel better in 1 to 2 days.  Home care  · Rest at home. Drink plenty of fluids to you won't get dehydrated.  · No work or school for the first 2 days of taking the antibiotics. After this time, you will not be contagious. You can then return to school or work if you are feeling better.   · Take antibiotic medicine for the full 10 days, even if you feel better. This is very important to ensure the infection is treated. It is also important to prevent medicine-resistant germs from developing. If you were given an antibiotic shot, you don't need any more antibiotics.  · You may use acetaminophen or ibuprofen to control pain or fever, unless another medicine was prescribed for this. Talk with your doctor before taking these medicines if you have chronic liver or kidney disease. Also talk with your doctor if you have had a stomach ulcer or GI bleeding.  · Throat lozenges or sprays help reduce pain. Gargling with warm saltwater will also reduce throat pain. Dissolve 1/2 teaspoon of salt in 1 glass of warm water. This may be useful just before meals.   · Soft foods are OK. Avoid salty or spicy foods.  Follow-up care  Follow up with your healthcare provider or our staff if you don't get better over the next week.  When to seek medical advice  Call your healthcare provider right away if any of these occur:  · Fever of 100.4ºF (38ºC) or higher, or as directed by your healthcare provider  · New or worsening ear pain, sinus pain, or headache  · Painful lumps in the back of neck  · Stiff neck  · Lymph nodes getting larger or becoming soft in the  "middle  · You can't swallow liquids or you can't open your mouth wide because of throat pain  · Signs of dehydration. These include very dark urine or no urine, sunken eyes, and dizziness.  · Trouble breathing or noisy breathing  · Muffled voice  · Rash  Date Last Reviewed: 4/13/2015  © 9566-2153 [employer]. [address] [place] [address], [place] [address]. All rights reserved. This information is not intended as a substitute for professional medical care. Always follow your healthcare professional's instructions.      Symptomatic treatment:    Tylenol every 4 hours  Ibuprofen ever 6 hours  salt water gargles  Cold-eeze helps to reduce the duration of sore throat symptoms  Cepachol helps to numb the discomfort  Chloroseptic spray  Nasal saline spray reduces inflammation and dryness  Warm face compresses as often as you can  Vicks vapor rub at night  Flonase OTC or Nasacort OTC  Simple foods like chicken noodle soup help  Delsym helps with coughing at night  Zyrtec/Claritin during the day and Benadryl at night may help if allergy component   Rest as much as you can                                                          If we discussed that I think your illness is viral it will not respond to antibiotics and it will last 10-14 days.  Flonase (fluticasone) is a nasal spray which is available over the counter and may help with your symptoms   Zyrtec D, Claritin D or allegra D can also help with symptoms of congestion and drainage.   If you have hypertension avoid using the "D" which is the decongestant   If you just have drainage you can take plain zyrtec, claritin or allegra   If you just have a congested feeling you can take pseudoephedrine (unless you have high blood pressure) which you have to sign for behind the counter. Do not buy the phenylephrine which is on the shelf as it is not effective   Tylenol or ibuprofen can also be used as directed for pain unless you have an allergy to them or medical " condition such as stomach ulcers, kidney or liver disease or blood thinners etc for which you should not be taking these type of medications.   If you are flying in the next few days Afrin nose drops for the airplane flight upon take off and landing may help. Other than at those times refrain from using afrin.       Please arrange follow up with your primary medical clinic as soon as possible. You must understand that you've received an Urgent Care treatment only and that you may be released before all of your medical problems are known or treated. You, the patient, will arrange for follow up as instructed. If your symptoms worsen or fail to improve you should go to the Emergency Room.

## 2018-11-11 NOTE — PROGRESS NOTES
"Subjective:       Patient ID: Georgie Wetzel is a 38 y.o. female.    Vitals:  height is 5' 5" (1.651 m) and weight is 61.2 kg (135 lb). Her oral temperature is 97.5 °F (36.4 °C). Her blood pressure is 124/79 and her pulse is 79. Her respiration is 18 and oxygen saturation is 97%.     Chief Complaint: Sore Throat    Pt states she has had a sore throat for 3 days. Pt reports left side pain is worse. Pt reports nasal congestion starting today. Pt denies taking any medications.       Sore Throat    This is a new problem. The current episode started in the past 7 days. The problem has been gradually worsening. The pain is worse on the left side. Associated symptoms include congestion and headaches. Pertinent negatives include no abdominal pain, diarrhea, neck pain, shortness of breath or vomiting. She has had no exposure to strep or mono. She has tried nothing for the symptoms. The treatment provided no relief.     Review of Systems   Constitution: Negative for chills and fever.   HENT: Positive for congestion and sore throat.    Eyes: Negative for blurred vision.   Cardiovascular: Negative for chest pain.   Respiratory: Negative for shortness of breath.    Skin: Negative for rash.   Musculoskeletal: Negative for back pain, joint pain and neck pain.   Gastrointestinal: Negative for abdominal pain, diarrhea, nausea and vomiting.   Neurological: Positive for headaches.   Psychiatric/Behavioral: The patient is not nervous/anxious.    All other systems reviewed and are negative.      Objective:      Physical Exam   Constitutional: She is oriented to person, place, and time. She appears well-developed and well-nourished. She is cooperative.  Non-toxic appearance. She does not appear ill. No distress.   HENT:   Head: Normocephalic and atraumatic.   Right Ear: Hearing, tympanic membrane, external ear and ear canal normal.   Left Ear: Hearing, tympanic membrane, external ear and ear canal normal.   Nose: Rhinorrhea present. " No mucosal edema or nasal deformity. No epistaxis. Right sinus exhibits no maxillary sinus tenderness and no frontal sinus tenderness. Left sinus exhibits no maxillary sinus tenderness and no frontal sinus tenderness.   Mouth/Throat: Uvula is midline and mucous membranes are normal. No trismus in the jaw. Normal dentition. No uvula swelling. Posterior oropharyngeal edema and posterior oropharyngeal erythema present. No oropharyngeal exudate or tonsillar abscesses. Tonsils are 0 on the right. Tonsils are 0 on the left.   Eyes: Conjunctivae and lids are normal. Right eye exhibits no discharge. Left eye exhibits no discharge. No scleral icterus.   Sclera clear bilat   Neck: Trachea normal, normal range of motion, full passive range of motion without pain and phonation normal. Neck supple.   Cardiovascular: Normal rate, regular rhythm, normal heart sounds, intact distal pulses and normal pulses.   Pulmonary/Chest: Effort normal and breath sounds normal. No respiratory distress.   Abdominal: Soft. Normal appearance and bowel sounds are normal. She exhibits no distension, no pulsatile midline mass and no mass. There is no tenderness.   Musculoskeletal: Normal range of motion. She exhibits no edema or deformity.   Lymphadenopathy:        Head (right side): Submandibular adenopathy present. No submental, no tonsillar, no preauricular, no posterior auricular and no occipital adenopathy present.        Head (left side): Submandibular adenopathy present. No submental, no tonsillar, no preauricular, no posterior auricular and no occipital adenopathy present.     She has cervical adenopathy.        Right cervical: No superficial cervical adenopathy present.       Left cervical: Superficial cervical adenopathy present.   Neurological: She is alert and oriented to person, place, and time. She exhibits normal muscle tone. Coordination normal.   Skin: Skin is warm, dry and intact. She is not diaphoretic. No pallor.   Psychiatric:  She has a normal mood and affect. Her speech is normal and behavior is normal. Judgment and thought content normal. Cognition and memory are normal.   Nursing note and vitals reviewed.      Assessment:       1. Strep pharyngitis    2. Sore throat        Plan:         Strep pharyngitis    Sore throat  -     POCT rapid strep A    Other orders  -     amoxicillin (AMOXIL) 500 MG capsule; Take 1 capsule (500 mg total) by mouth every 12 (twelve) hours. for 10 days  Dispense: 20 capsule; Refill: 0

## 2018-12-17 DIAGNOSIS — G43.109 MIGRAINE WITH AURA AND WITHOUT STATUS MIGRAINOSUS, NOT INTRACTABLE: ICD-10-CM

## 2018-12-17 DIAGNOSIS — M79.18 MYOFASCIAL PAIN: ICD-10-CM

## 2018-12-17 DIAGNOSIS — G47.9 TROUBLE IN SLEEPING: ICD-10-CM

## 2018-12-17 DIAGNOSIS — R51.9 NONINTRACTABLE EPISODIC HEADACHE, UNSPECIFIED HEADACHE TYPE: ICD-10-CM

## 2018-12-17 RX ORDER — TIZANIDINE 2 MG/1
TABLET ORAL
Qty: 60 TABLET | Refills: 4 | Status: SHIPPED | OUTPATIENT
Start: 2018-12-17

## 2019-01-15 ENCOUNTER — IMMUNIZATION (OUTPATIENT)
Dept: INTERNAL MEDICINE | Facility: CLINIC | Age: 39
End: 2019-01-15
Payer: COMMERCIAL

## 2019-01-15 DIAGNOSIS — Z23 NEED FOR TDAP VACCINATION: Primary | ICD-10-CM

## 2019-01-15 PROCEDURE — 90472 IMMUNIZATION ADMIN EACH ADD: CPT | Mod: S$GLB,,, | Performed by: INTERNAL MEDICINE

## 2019-01-15 PROCEDURE — 90471 TDAP VACCINE GREATER THAN OR EQUAL TO 7YO IM: ICD-10-PCS | Mod: S$GLB,,, | Performed by: INTERNAL MEDICINE

## 2019-01-15 PROCEDURE — 90686 FLU VACCINE (QUAD) GREATER THAN OR EQUAL TO 3YO PRESERVATIVE FREE IM: ICD-10-PCS | Mod: S$GLB,,, | Performed by: INTERNAL MEDICINE

## 2019-01-15 PROCEDURE — 90686 IIV4 VACC NO PRSV 0.5 ML IM: CPT | Mod: S$GLB,,, | Performed by: INTERNAL MEDICINE

## 2019-01-15 PROCEDURE — 90715 TDAP VACCINE GREATER THAN OR EQUAL TO 7YO IM: ICD-10-PCS | Mod: S$GLB,,, | Performed by: INTERNAL MEDICINE

## 2019-01-15 PROCEDURE — 90472 FLU VACCINE (QUAD) GREATER THAN OR EQUAL TO 3YO PRESERVATIVE FREE IM: ICD-10-PCS | Mod: S$GLB,,, | Performed by: INTERNAL MEDICINE

## 2019-01-15 PROCEDURE — 90715 TDAP VACCINE 7 YRS/> IM: CPT | Mod: S$GLB,,, | Performed by: INTERNAL MEDICINE

## 2019-01-15 PROCEDURE — 90471 IMMUNIZATION ADMIN: CPT | Mod: S$GLB,,, | Performed by: INTERNAL MEDICINE

## 2019-01-22 ENCOUNTER — OFFICE VISIT (OUTPATIENT)
Dept: OBSTETRICS AND GYNECOLOGY | Facility: CLINIC | Age: 39
End: 2019-01-22
Attending: OBSTETRICS & GYNECOLOGY
Payer: COMMERCIAL

## 2019-01-22 ENCOUNTER — LAB VISIT (OUTPATIENT)
Dept: LAB | Facility: OTHER | Age: 39
End: 2019-01-22
Attending: OBSTETRICS & GYNECOLOGY
Payer: COMMERCIAL

## 2019-01-22 VITALS
SYSTOLIC BLOOD PRESSURE: 108 MMHG | HEIGHT: 65 IN | WEIGHT: 129.63 LBS | DIASTOLIC BLOOD PRESSURE: 70 MMHG | BODY MASS INDEX: 21.6 KG/M2

## 2019-01-22 DIAGNOSIS — Z01.419 WELL WOMAN EXAM WITH ROUTINE GYNECOLOGICAL EXAM: Primary | ICD-10-CM

## 2019-01-22 DIAGNOSIS — Z11.3 SCREEN FOR STD (SEXUALLY TRANSMITTED DISEASE): ICD-10-CM

## 2019-01-22 DIAGNOSIS — Z12.4 PAP SMEAR FOR CERVICAL CANCER SCREENING: ICD-10-CM

## 2019-01-22 PROCEDURE — 99999 PR PBB SHADOW E&M-EST. PATIENT-LVL III: CPT | Mod: PBBFAC,,, | Performed by: OBSTETRICS & GYNECOLOGY

## 2019-01-22 PROCEDURE — 36415 COLL VENOUS BLD VENIPUNCTURE: CPT

## 2019-01-22 PROCEDURE — 88175 CYTOPATH C/V AUTO FLUID REDO: CPT

## 2019-01-22 PROCEDURE — 99395 PREV VISIT EST AGE 18-39: CPT | Mod: S$GLB,,, | Performed by: OBSTETRICS & GYNECOLOGY

## 2019-01-22 PROCEDURE — 86703 HIV-1/HIV-2 1 RESULT ANTBDY: CPT

## 2019-01-22 PROCEDURE — 99999 PR PBB SHADOW E&M-EST. PATIENT-LVL III: ICD-10-PCS | Mod: PBBFAC,,, | Performed by: OBSTETRICS & GYNECOLOGY

## 2019-01-22 PROCEDURE — 80074 ACUTE HEPATITIS PANEL: CPT

## 2019-01-22 PROCEDURE — 86592 SYPHILIS TEST NON-TREP QUAL: CPT

## 2019-01-22 PROCEDURE — 87624 HPV HI-RISK TYP POOLED RSLT: CPT

## 2019-01-22 PROCEDURE — 99395 PR PREVENTIVE VISIT,EST,18-39: ICD-10-PCS | Mod: S$GLB,,, | Performed by: OBSTETRICS & GYNECOLOGY

## 2019-01-22 PROCEDURE — 87491 CHLMYD TRACH DNA AMP PROBE: CPT

## 2019-01-22 RX ORDER — NORETHINDRONE ACETATE AND ETHINYL ESTRADIOL .02; 1 MG/1; MG/1
1 TABLET ORAL DAILY
Qty: 63 TABLET | Refills: 4 | Status: SHIPPED | OUTPATIENT
Start: 2019-01-22 | End: 2020-01-12

## 2019-01-22 NOTE — PROGRESS NOTES
Georgie Wetzel is 38 y.o. female  who presents today for annual exam.  She continues on Microgestin OCPs with essentially no withdrawal bleeding.  Occasionally, she may have spotting during the hormone free interval.  Requests STD screening.  Denies recent changes in her medical / surgical history.  No GYN complaints.   Patient's last menstrual period was 2018 (approximate).    Past Medical History:   Diagnosis Date    Acne     Depression     treated with medication last use , situational     GERD (gastroesophageal reflux disease)     High cholesterol     Retinal detachment        Past Surgical History:   Procedure Laterality Date     Retinal Tear OS        COLONOSCOPY N/A 2017    Performed [facility] at [facility] ([facility] F[facility])    CRYOTHERAPY      RETINAL DETACHMENT SURGERY      RPK  OU Dr Munoz    2008    TONSILLECTOMY, ADENOIDECTOMY           ROS:  GENERAL: Feeling well overall.   SKIN: Denies rash or lesions.   HEAD: Denies head injury or headache.   NODES: Denies enlarged lymph nodes.   CHEST: Denies chest pain or shortness of breath.   CARDIOVASCULAR: Denies palpitations or left sided chest pain.   ABDOMEN: No abdominal pain, nausea, vomiting or rectal bleeding.   URINARY: No dysuria or hematuria.  REPRODUCTIVE: See HPI.   BREASTS: Denies pain, lumps, or nipple discharge.   HEMATOLOGIC: No easy bruisability or excessive bleeding.   MUSCULOSKELETAL: Denies joint pain or swelling.   NEUROLOGIC: Denies syncope or weakness.   PSYCHIATRIC: Denies depression.    PE:   (chaperone present during entire exam)  APPEARANCE: Well nourished, well developed, in no acute distress.  BREASTS: Symmetrical, no skin changes or visible lesions. No palpable masses, nipple discharge or adenopathy bilaterally.  ABDOMEN: Soft. No tenderness or masses. No hernias. No CVA tenderness.  VULVA: No lesions. Normal female genitalia.  URETHRAL MEATUS: Normal size and location, no lesions, no  prolapse.  URETHRA: No masses, tenderness, prolapse or scarring.  VAGINA: No lesions, no discharge, no significant cystocele or rectocele.  CERVIX: No lesions and discharge. Somewhat stenotic. PAP done.  UTERUS: Normal size, regular shape, mobile, non-tender, bladder base nontender.  ADNEXA: No masses, tenderness or CDS nodularity.  ANUS PERINEUM: Normal.      Diagnosis:  1. Well woman exam with routine gynecological exam    2. Pap smear for cervical cancer screening    3. Screen for STD (sexually transmitted disease)          PLAN:    Orders Placed This Encounter    C. trachomatis/N. gonorrhoeae by AMP DNA [facility]; Cervix    HPV High Risk Genotypes, PCR    HIV 1/2 Ag/Ab (4th Gen)    RPR    Hepatitis panel, acute    Liquid-based pap smear, screening    norethindrone-ethinyl estradiol (MICROGESTIN 1/20) 1-20 mg-mcg per tablet         Patient was counseled today on the usage of OCPs: r/b.  She is doing well on Microgestin and desires to continue.  We will contact her with results of pap, labs, and cervical culture.    Follow-up in 1 year.

## 2019-01-23 ENCOUNTER — PATIENT MESSAGE (OUTPATIENT)
Dept: OBSTETRICS AND GYNECOLOGY | Facility: CLINIC | Age: 39
End: 2019-01-23

## 2019-01-23 LAB
HAV IGM SERPL QL IA: NEGATIVE
HBV CORE IGM SERPL QL IA: NEGATIVE
HBV SURFACE AG SERPL QL IA: NEGATIVE
HCV AB SERPL QL IA: NEGATIVE
HIV 1+2 AB+HIV1 P24 AG SERPL QL IA: NEGATIVE
RPR SER QL: NORMAL

## 2019-01-24 ENCOUNTER — PATIENT MESSAGE (OUTPATIENT)
Dept: OBSTETRICS AND GYNECOLOGY | Facility: CLINIC | Age: 39
End: 2019-01-24

## 2019-01-24 LAB
C TRACH DNA SPEC QL NAA+PROBE: NOT DETECTED
N GONORRHOEA DNA SPEC QL NAA+PROBE: NOT DETECTED

## 2019-01-28 LAB
HPV HR 12 DNA CVX QL NAA+PROBE: NEGATIVE
HPV16 AG SPEC QL: NEGATIVE
HPV18 DNA SPEC QL NAA+PROBE: NEGATIVE

## 2019-01-31 ENCOUNTER — PATIENT MESSAGE (OUTPATIENT)
Dept: MATERNAL FETAL MEDICINE | Facility: CLINIC | Age: 39
End: 2019-01-31

## 2019-02-16 ENCOUNTER — OFFICE VISIT (OUTPATIENT)
Dept: URGENT CARE | Facility: CLINIC | Age: 39
End: 2019-02-16
Payer: COMMERCIAL

## 2019-02-16 VITALS
DIASTOLIC BLOOD PRESSURE: 70 MMHG | TEMPERATURE: 98 F | WEIGHT: 125 LBS | OXYGEN SATURATION: 99 % | RESPIRATION RATE: 18 BRPM | SYSTOLIC BLOOD PRESSURE: 115 MMHG | HEIGHT: 65 IN | HEART RATE: 56 BPM | BODY MASS INDEX: 20.83 KG/M2

## 2019-02-16 DIAGNOSIS — J06.9 UPPER RESPIRATORY TRACT INFECTION, UNSPECIFIED TYPE: ICD-10-CM

## 2019-02-16 DIAGNOSIS — R05.9 COUGH: Primary | ICD-10-CM

## 2019-02-16 LAB
CTP QC/QA: YES
FLUAV AG NPH QL: NEGATIVE
FLUBV AG NPH QL: NEGATIVE

## 2019-02-16 PROCEDURE — 99214 PR OFFICE/OUTPT VISIT, EST, LEVL IV, 30-39 MIN: ICD-10-PCS | Mod: 25,S$GLB,, | Performed by: FAMILY MEDICINE

## 2019-02-16 PROCEDURE — 87804 POCT INFLUENZA A/B: ICD-10-PCS | Mod: QW,S$GLB,, | Performed by: FAMILY MEDICINE

## 2019-02-16 PROCEDURE — 96372 PR INJECTION,THERAP/PROPH/DIAG2ST, IM OR SUBCUT: ICD-10-PCS | Mod: S$GLB,,, | Performed by: FAMILY MEDICINE

## 2019-02-16 PROCEDURE — 3008F BODY MASS INDEX DOCD: CPT | Mod: CPTII,S$GLB,, | Performed by: FAMILY MEDICINE

## 2019-02-16 PROCEDURE — 96372 THER/PROPH/DIAG INJ SC/IM: CPT | Mod: S$GLB,,, | Performed by: FAMILY MEDICINE

## 2019-02-16 PROCEDURE — 99214 OFFICE O/P EST MOD 30 MIN: CPT | Mod: 25,S$GLB,, | Performed by: FAMILY MEDICINE

## 2019-02-16 PROCEDURE — 3008F PR BODY MASS INDEX (BMI) DOCUMENTED: ICD-10-PCS | Mod: CPTII,S$GLB,, | Performed by: FAMILY MEDICINE

## 2019-02-16 PROCEDURE — 87804 INFLUENZA ASSAY W/OPTIC: CPT | Mod: QW,S$GLB,, | Performed by: FAMILY MEDICINE

## 2019-02-16 RX ORDER — BETAMETHASONE SODIUM PHOSPHATE AND BETAMETHASONE ACETATE 3; 3 MG/ML; MG/ML
9 INJECTION, SUSPENSION INTRA-ARTICULAR; INTRALESIONAL; INTRAMUSCULAR; SOFT TISSUE
Status: COMPLETED | OUTPATIENT
Start: 2019-02-16 | End: 2019-02-16

## 2019-02-16 RX ADMIN — BETAMETHASONE SODIUM PHOSPHATE AND BETAMETHASONE ACETATE 9 MG: 3; 3 INJECTION, SUSPENSION INTRA-ARTICULAR; INTRALESIONAL; INTRAMUSCULAR; SOFT TISSUE at 10:02

## 2019-02-16 NOTE — PATIENT INSTRUCTIONS
Viral Upper Respiratory Illness (Adult)  You have a viral upper respiratory illness (URI), which is another term for the common cold. This illness is contagious during the first few days. It is spread through the air by coughing and sneezing. It may also be spread by direct contact (touching the sick person and then touching your own eyes, nose, or mouth). Frequent handwashing will decrease risk of spread. Most viral illnesses go away within 7 to 10 days with rest and simple home remedies. Sometimes the illness may last for several weeks. Antibiotics will not kill a virus, and they are generally not prescribed for this condition.    Home care  · If symptoms are severe, rest at home for the first 2 to 3 days. When you resume activity, don't let yourself get too tired.  · Avoid being exposed to cigarette smoke (yours or others).  · You may use acetaminophen or ibuprofen to control pain and fever, unless another medicine was prescribed. (Note: If you have chronic liver or kidney disease, have ever had a stomach ulcer or gastrointestinal bleeding, or are taking blood-thinning medicines, talk with your healthcare provider before using these medicines.) Aspirin should never be given to anyone under 18 years of age who is ill with a viral infection or fever. It may cause severe liver or brain damage.  · Your appetite may be poor, so a light diet is fine. Avoid dehydration by drinking 6 to 8 glasses of fluids per day (water, soft drinks, juices, tea, or soup). Extra fluids will help loosen secretions in the nose and lungs.  · Over-the-counter cold medicines will not shorten the length of time youre sick, but they may be helpful for the following symptoms: cough, sore throat, and nasal and sinus congestion. (Note: Do not use decongestants if you have high blood pressure.)  Follow-up care  Follow up with your healthcare provider, or as advised.  When to seek medical advice  Call your healthcare provider right away if any  of these occur:  · Cough with lots of colored sputum (mucus)  · Severe headache; face, neck, or ear pain  · Difficulty swallowing due to throat pain  · Fever of 100.4°F (38°C)  Call 911, or get immediate medical care  Call emergency services right away if any of these occur:  · Chest pain, shortness of breath, wheezing, or difficulty breathing  · Coughing up blood  · Inability to swallow due to throat pain  Date Last Reviewed: 9/13/2015  © 5877-3588 [employer]. [address] [place] [address]. All rights reserved. This information is not intended as a substitute for professional medical care. Always follow your healthcare professional's instructions.

## 2019-02-16 NOTE — PROGRESS NOTES
"Subjective:       Patient ID: Georgie Wetzel is a 38 y.o. female.    Vitals:  height is 5' 5" (1.651 m) and weight is 56.7 kg (125 lb). Her tympanic temperature is 97.9 °F (36.6 °C). Her blood pressure is 115/70 and her pulse is 56 (abnormal). Her respiration is 18 and oxygen saturation is 99%.     Chief Complaint: URI    Pt reports scratchy throat starting two days ago. Pt states symptoms worsened with congestion, cough, back pain, neck pain, and chills. Pt states she had strep throat 6 weeks ago and recently got a flu shot. Pt states she has taken theraflu      URI    This is a new problem. The current episode started in the past 7 days. The problem has been gradually worsening. There has been no fever. Associated symptoms include congestion, coughing, joint pain, sinus pain, sneezing and a sore throat. Pertinent negatives include no ear pain, nausea, rash, vomiting or wheezing. Treatments tried: theraflu.       Constitution: Positive for chills, fatigue and generalized weakness. Negative for sweating and fever.   HENT: Positive for congestion, sinus pain and sore throat. Negative for ear pain, sinus pressure and voice change.    Neck: Negative for painful lymph nodes.   Eyes: Negative for eye redness.   Respiratory: Positive for cough and sputum production. Negative for chest tightness, bloody sputum, COPD, shortness of breath, stridor, wheezing and asthma.    Gastrointestinal: Negative for nausea and vomiting.   Musculoskeletal: Positive for back pain and muscle cramps. Negative for muscle ache.   Skin: Negative for rash.   Allergic/Immunologic: Positive for sneezing. Negative for seasonal allergies and asthma.   Hematologic/Lymphatic: Negative for swollen lymph nodes.       Objective:      Physical Exam   Constitutional: She is oriented to person, place, and time. She appears well-developed and well-nourished. She is cooperative.  Non-toxic appearance. She does not appear ill. No distress.   HENT:   Head: " Normocephalic and atraumatic.   Right Ear: Hearing, tympanic membrane, external ear and ear canal normal.   Left Ear: Hearing, tympanic membrane, external ear and ear canal normal.   Nose: Nose normal. No mucosal edema, rhinorrhea or nasal deformity. No epistaxis. Right sinus exhibits no maxillary sinus tenderness and no frontal sinus tenderness. Left sinus exhibits no maxillary sinus tenderness and no frontal sinus tenderness.   Mouth/Throat: Uvula is midline and mucous membranes are normal. No trismus in the jaw. Normal dentition. No uvula swelling. Posterior oropharyngeal erythema present.   Eyes: Conjunctivae and lids are normal. No scleral icterus.   Sclera clear bilat   Neck: Trachea normal, full passive range of motion without pain and phonation normal. Neck supple.   Cardiovascular: Normal rate, regular rhythm, normal heart sounds, intact distal pulses and normal pulses.   Pulmonary/Chest: Effort normal and breath sounds normal. No respiratory distress.   Abdominal: Soft. Normal appearance and bowel sounds are normal. She exhibits no distension. There is no tenderness.   Musculoskeletal: Normal range of motion. She exhibits no edema or deformity.   Neurological: She is alert and oriented to person, place, and time. She exhibits normal muscle tone. Coordination normal.   Skin: Skin is warm, dry and intact. She is not diaphoretic. No pallor.   Psychiatric: She has a normal mood and affect. Her speech is normal and behavior is normal. Judgment and thought content normal. Cognition and memory are normal.   Nursing note and vitals reviewed.      Assessment:       1. Cough    2. Upper respiratory tract infection, unspecified type        Plan:         Cough  -     POCT Influenza A/B    Upper respiratory tract infection, unspecified type    Other orders  -     betamethasone acetate-betamethasone sodium phosphate injection 9 mg

## 2019-02-18 ENCOUNTER — OFFICE VISIT (OUTPATIENT)
Dept: OPHTHALMOLOGY | Facility: CLINIC | Age: 39
End: 2019-02-18
Payer: COMMERCIAL

## 2019-02-18 DIAGNOSIS — H31.003 CHORIORETINAL SCAR OF BOTH EYES: Primary | ICD-10-CM

## 2019-02-18 PROCEDURE — 92226 PR SPECIAL EYE EXAM, SUBSEQUENT: ICD-10-PCS | Mod: RT,S$GLB,, | Performed by: OPHTHALMOLOGY

## 2019-02-18 PROCEDURE — 92014 PR EYE EXAM, EST PATIENT,COMPREHESV: ICD-10-PCS | Mod: S$GLB,,, | Performed by: OPHTHALMOLOGY

## 2019-02-18 PROCEDURE — 92226 PR SPECIAL EYE EXAM, SUBSEQUENT: CPT | Mod: RT,S$GLB,, | Performed by: OPHTHALMOLOGY

## 2019-02-18 PROCEDURE — 99999 PR PBB SHADOW E&M-EST. PATIENT-LVL III: ICD-10-PCS | Mod: PBBFAC,,, | Performed by: OPHTHALMOLOGY

## 2019-02-18 PROCEDURE — 92014 COMPRE OPH EXAM EST PT 1/>: CPT | Mod: S$GLB,,, | Performed by: OPHTHALMOLOGY

## 2019-02-18 PROCEDURE — 99999 PR PBB SHADOW E&M-EST. PATIENT-LVL III: CPT | Mod: PBBFAC,,, | Performed by: OPHTHALMOLOGY

## 2019-02-18 NOTE — PROGRESS NOTES
HPI     DLS 02/16/18 by Dr. Quesada here today for annual retina exam    Pt states that she is here for check up for retina.  No pain, flashes,   +long standing floaters without sig change, no double vision.  Had one episode 2 months ago of hazy spot in vision OD for 5 min after   sleeping on OD.    If stands up too quickly, gets spots in eyes    Eye Med(s) - none    Lorenzo Myopia S/P PRK OU  Dr. Munoz 2008  [facility]   S/p Focal OS 2005 for HST        Last edited by Wilton Quesada MD on 2/20/2019 10:27 PM. (History)            Assessment /Plan     For exam results, see Encounter Report.    Chorioretinal scar of both eyes    Retina exam stable  H/o laser Rx.  H/o refractive surgery    Pathology of PVD, Retinal Tear, Retinal Detachment reviewed in great detail  RD precautions discussed in detail, patient expressed understanding  RTC 1 yr, sooner PRN (especially ANY change flashes, floaters, vision, visual field)         Spots in eyes upon standing.  Not explained by ocular exam  Can check orthostatic BPs or other eval at PMD

## 2019-02-18 NOTE — Clinical Note
Hi.Our mutual patient is having some visual symptoms upon standing quickly.  Has spots in the eyes.  Not explained by ocular exam.  I was wondering if it may be BP related.  Nothing urgent but wonder if you could look into this whenever she sees you.Thanks.Kelton Quesada

## 2019-02-19 ENCOUNTER — TELEPHONE (OUTPATIENT)
Dept: URGENT CARE | Facility: CLINIC | Age: 39
End: 2019-02-19

## 2019-02-19 NOTE — TELEPHONE ENCOUNTER
Courtesy call.    Patient not better. Encouraged f/u if sxs persist past 7*10 days. Instructed to call back if sxs worsen. pasquale

## 2019-02-20 ENCOUNTER — OFFICE VISIT (OUTPATIENT)
Dept: INTERNAL MEDICINE | Facility: CLINIC | Age: 39
End: 2019-02-20
Payer: COMMERCIAL

## 2019-02-20 VITALS
DIASTOLIC BLOOD PRESSURE: 70 MMHG | HEART RATE: 70 BPM | HEIGHT: 65 IN | SYSTOLIC BLOOD PRESSURE: 118 MMHG | OXYGEN SATURATION: 98 % | TEMPERATURE: 98 F | WEIGHT: 125.88 LBS | BODY MASS INDEX: 20.97 KG/M2

## 2019-02-20 DIAGNOSIS — R09.81 SINUS CONGESTION: Primary | ICD-10-CM

## 2019-02-20 DIAGNOSIS — R05.9 COUGH: ICD-10-CM

## 2019-02-20 PROCEDURE — 3008F PR BODY MASS INDEX (BMI) DOCUMENTED: ICD-10-PCS | Mod: CPTII,S$GLB,, | Performed by: INTERNAL MEDICINE

## 2019-02-20 PROCEDURE — 3008F BODY MASS INDEX DOCD: CPT | Mod: CPTII,S$GLB,, | Performed by: INTERNAL MEDICINE

## 2019-02-20 PROCEDURE — 99999 PR PBB SHADOW E&M-EST. PATIENT-LVL IV: ICD-10-PCS | Mod: PBBFAC,,, | Performed by: INTERNAL MEDICINE

## 2019-02-20 PROCEDURE — 99213 OFFICE O/P EST LOW 20 MIN: CPT | Mod: S$GLB,,, | Performed by: INTERNAL MEDICINE

## 2019-02-20 PROCEDURE — 99213 PR OFFICE/OUTPT VISIT, EST, LEVL III, 20-29 MIN: ICD-10-PCS | Mod: S$GLB,,, | Performed by: INTERNAL MEDICINE

## 2019-02-20 PROCEDURE — 99999 PR PBB SHADOW E&M-EST. PATIENT-LVL IV: CPT | Mod: PBBFAC,,, | Performed by: INTERNAL MEDICINE

## 2019-02-20 RX ORDER — CODEINE PHOSPHATE AND GUAIFENESIN 10; 100 MG/5ML; MG/5ML
5 SOLUTION ORAL 3 TIMES DAILY PRN
Qty: 118 ML | Refills: 0 | Status: SHIPPED | OUTPATIENT
Start: 2019-02-20 | End: 2019-03-02

## 2019-02-20 RX ORDER — AZITHROMYCIN 250 MG/1
TABLET, FILM COATED ORAL
Qty: 6 TABLET | Refills: 0 | Status: SHIPPED | OUTPATIENT
Start: 2019-02-20 | End: 2019-03-28

## 2019-02-20 NOTE — PATIENT INSTRUCTIONS
Nose: Dry  Sterile Saline wash: Arm and Hammer Can 3-4 times  Allegra 12 hour or Claritin 12 hour  Flonase twice    Larynx:  Not talking  Hot tea , honey! and lemon    Lung/cough  Robitussin with codeine bedtime   ( During the day: Delsym)  Zithormax    Rest  Baths, showers don't help  Advil 2 ( 200 mg) twice a day    Gatorade

## 2019-02-20 NOTE — PROGRESS NOTES
Subjective:      Patient ID: Georgie Wetzel is a 38 y.o. female.    Chief Complaint: URI    HPI:  Sinus Problem   This is a new problem. The current episode started in the past 7 days. The problem has been gradually worsening since onset. There has been no fever. Associated symptoms include congestion, coughing, ear pain, headaches, a hoarse voice, sinus pressure and a sore throat. Pertinent negatives include no shortness of breath or swollen glands. Treatments tried: Patient has been seen in urgent care and given celestone  The treatment provided no relief.     Patient Active Problem List   Diagnosis    Hypogonadism    Hirsutism    Acne    Floater, vitreous    White without pressure of peripheral retina of both eyes    HLD (hyperlipidemia)    GERD (gastroesophageal reflux disease)    Amenorrhea    Migraine with aura     Past Medical History:   Diagnosis Date    Acne     Depression     treated with medication last use 2010, situational     GERD (gastroesophageal reflux disease)     High cholesterol     Retinal detachment      Past Surgical History:   Procedure Laterality Date     Retinal Tear OS  2005      COLONOSCOPY N/A 8/25/2017    Performed by [facility] at [facility] (4TH FLR)    CRYOTHERAPY      RETINAL DETACHMENT SURGERY      RPK  OU Dr Munoz    7/2008    TONSILLECTOMY, ADENOIDECTOMY       Family History   Problem Relation Age of Onset    Acne Brother     Ovarian cancer Mother     Diabetes Maternal Grandmother     Glaucoma Maternal Grandfather     Cataracts Maternal Grandfather     Stroke Paternal Grandmother     Scleroderma Paternal Grandmother     Atrial fibrillation Father     Hyperlipidemia Father     Melanoma Neg Hx     Breast cancer Neg Hx      Review of Systems   HENT: Positive for congestion, ear pain, hoarse voice, sinus pressure and sore throat.    Respiratory: Positive for cough. Negative for shortness of breath.    Neurological: Positive for headaches.  "    Objective:     Vitals:    02/20/19 1506   BP: 118/70   Pulse: 70   Temp: 98 °F (36.7 °C)   SpO2: 98%   Weight: 57.1 kg (125 lb 14.1 oz)   Height: 5' 5" (1.651 m)   PainSc: 0-No pain     Body mass index is 20.95 kg/m².  Physical Exam   HENT:   TM fluid filled  Nasal congestion  Hoarseness   Neck: Normal range of motion.   Cardiovascular: Normal rate, regular rhythm and normal heart sounds.   Pulmonary/Chest: Effort normal and breath sounds normal.   Abdominal: Soft. Bowel sounds are normal.   Lymphadenopathy:     She has no cervical adenopathy.     Assessment:     1. Sinus congestion    2. Cough      Plan:   Georgie was seen today for uri.    Diagnoses and all orders for this visit:    Sinus congestion  Comments:  Likely a bacterial sinus infection has developed at day 9 , needs to dry sinuses also    Cough  Comments:  Suggestions given    Other orders  -     azithromycin (Z-DAMARIS) 250 MG tablet; 2 initially then one daily  -     guaifenesin-codeine 100-10 mg/5 ml (TUSSI-ORGANIDIN NR)  mg/5 mL syrup; Take 5 mLs by mouth 3 (three) times daily as needed for Cough.    Nose: Dry  Sterile Saline wash: Arm and Hammer Can 3-4 times  Allegra 12 hour or Claritin 12 hour  Flonase twice    Larynx:  Not talking  Hot tea , honey! and lemon    Lung/cough  Robitussin with codeine bedtime   ( During the day: Delsym)  Zithormax    Rest  Baths, showers don't help  Advil 2 ( 200 mg) twice a day    Gatorade        Problem List Items Addressed This Visit     None      Visit Diagnoses     Sinus congestion    -  Primary    Likely a bacterial sinus infection has developed at day 9 , needs to dry sinuses also    Cough        Suggestions given        No orders of the defined types were placed in this encounter.    No Follow-up on file.     Medication List           Accurate as of 2/20/19 11:59 PM. If you have any questions, ask your nurse or doctor.               START taking these medications    azithromycin 250 MG tablet  Commonly " known as:  Z-DAMARIS  2 initially then one daily  Started by:  Yu Moise MD     guaifenesin-codeine 100-10 mg/5 ml  mg/5 mL syrup  Commonly known as:  TUSSI-ORGANIDIN NR  Take 5 mLs by mouth 3 (three) times daily as needed for Cough.  Started by:  Yu Moise MD        CONTINUE taking these medications    ACIDOPHILUS ORAL     co-enzyme Q-10 50 mg capsule     diclofenac 75 MG EC tablet  Commonly known as:  VOLTAREN  Take 1 tab by mouth every 8-10 hours as needed for headaches. No more than 2 tabs/day, no more than 3 days/week.     MAGNESIUM ORAL     norethindrone-ethinyl estradiol 1-20 mg-mcg per tablet  Commonly known as:  MICROGESTIN 1/20  Take 1 tablet by mouth once daily.     omeprazole 20 MG tablet  Commonly known as:  PRILOSEC OTC     ondansetron 4 MG Tbdl  Commonly known as:  ZOFRAN-ODT     riboflavin (vitamin B2) 400 mg Tab     rizatriptan 5 MG disintegrating tablet  Commonly known as:  MAXALT-MLT  Dissolve 1 tab on tongue at onset of migraine, may repeat in 2 hours if needed. Max 3 tabs/day. Max 3 days/week.     spironolactone 100 MG tablet  Commonly known as:  ALDACTONE     SS 10-2 10-2 % Clsr  Generic drug:  sulfacetamide sodium-sulfur     tiZANidine 2 MG tablet  Commonly known as:  ZANAFLEX  TAKE 1 TO 2 TABLETS BY MOUTH AT BEDTIME. NO DRIVING WHILE ON THIS MED. Will need appt for further refills.     ZIANA gel  Generic drug:  clindamycin-tretinoin           Where to Get Your Medications      These medications were sent to [facility]/pharmacy #[id number] - [place], [place] - [address]  [address] [address]    Hours:  24-hours Phone:  [phone]   · azithromycin 250 MG tablet  · guaifenesin-codeine 100-10 mg/5 ml  mg/5 mL syrup

## 2019-02-21 ENCOUNTER — TELEPHONE (OUTPATIENT)
Dept: INTERNAL MEDICINE | Facility: CLINIC | Age: 39
End: 2019-02-21

## 2019-02-21 NOTE — TELEPHONE ENCOUNTER
----- Message from Wilton Quesada MD sent at 2/20/2019 10:37 PM CST -----  Hi.    Our mutual patient is having some visual symptoms upon standing quickly.  Has spots in the eyes.  Not explained by ocular exam.  I was wondering if it may be BP related.  Nothing urgent but wonder if you could look into this whenever she sees you.    Thanks.    Kelton Quesada   details…

## 2019-02-21 NOTE — TELEPHONE ENCOUNTER
Stacy, can you call pt to schedule f/u appt? Please also remind pt to see her neurologist since she has a h/o ocular migraines this may be related?

## 2019-03-28 ENCOUNTER — OFFICE VISIT (OUTPATIENT)
Dept: INTERNAL MEDICINE | Facility: CLINIC | Age: 39
End: 2019-03-28
Payer: COMMERCIAL

## 2019-03-28 VITALS
SYSTOLIC BLOOD PRESSURE: 106 MMHG | BODY MASS INDEX: 21.19 KG/M2 | HEART RATE: 86 BPM | OXYGEN SATURATION: 97 % | TEMPERATURE: 99 F | HEIGHT: 65 IN | WEIGHT: 127.19 LBS | DIASTOLIC BLOOD PRESSURE: 86 MMHG

## 2019-03-28 DIAGNOSIS — J32.9 RHINOSINUSITIS: Primary | ICD-10-CM

## 2019-03-28 PROCEDURE — 3008F BODY MASS INDEX DOCD: CPT | Mod: CPTII,S$GLB,, | Performed by: INTERNAL MEDICINE

## 2019-03-28 PROCEDURE — 99213 OFFICE O/P EST LOW 20 MIN: CPT | Mod: S$GLB,,, | Performed by: INTERNAL MEDICINE

## 2019-03-28 PROCEDURE — 99999 PR PBB SHADOW E&M-EST. PATIENT-LVL III: ICD-10-PCS | Mod: PBBFAC,,, | Performed by: INTERNAL MEDICINE

## 2019-03-28 PROCEDURE — 99999 PR PBB SHADOW E&M-EST. PATIENT-LVL III: CPT | Mod: PBBFAC,,, | Performed by: INTERNAL MEDICINE

## 2019-03-28 PROCEDURE — 3008F PR BODY MASS INDEX (BMI) DOCUMENTED: ICD-10-PCS | Mod: CPTII,S$GLB,, | Performed by: INTERNAL MEDICINE

## 2019-03-28 PROCEDURE — 99213 PR OFFICE/OUTPT VISIT, EST, LEVL III, 20-29 MIN: ICD-10-PCS | Mod: S$GLB,,, | Performed by: INTERNAL MEDICINE

## 2019-03-28 NOTE — PROGRESS NOTES
Subjective:       Patient ID: Georgie Wetzel is a 38 y.o. female.    Chief Complaint: Fatigue; Cough; and Chills    HPI - Treated for a URI a month ago through  and primary care with steroid injection and antibiotics/cough syrup.  Got better, now has 3 days of tickle in the back of her throat, cough, runny nose, chills, fatigue and muscle soreness.  No fevers; cough is dry.  Hasn't had allergies in the past.    Pmh/meds:  Reviewed and reconciled in EPIC with patient during visit today.    Review of Systems   Constitutional: Positive for chills.   HENT: Positive for congestion, postnasal drip and rhinorrhea.    Respiratory: Positive for cough.    Cardiovascular: Negative for chest pain.   Gastrointestinal: Negative for abdominal distention.   Musculoskeletal: Positive for myalgias.   Skin: Negative for rash.   Neurological: Negative for headaches.   Psychiatric/Behavioral: Positive for sleep disturbance.       Objective:      Physical Exam   Constitutional: She is oriented to person, place, and time. She appears well-developed and well-nourished.   Fatigued appearing woman   HENT:   Head: Normocephalic and atraumatic.   Right Ear: External ear normal.   Left Ear: External ear normal.   Mouth/Throat: Oropharynx is clear and moist. No oropharyngeal exudate (but PND noted).   Cardiovascular: Normal rate, regular rhythm and normal heart sounds. Exam reveals no gallop and no friction rub.   No murmur heard.  Pulmonary/Chest: Effort normal and breath sounds normal. No respiratory distress. She has no wheezes. She has no rales. She exhibits no tenderness.   Lymphadenopathy:     She has no cervical adenopathy.   Neurological: She is alert and oriented to person, place, and time.   Skin: Skin is warm and dry. No erythema.   Psychiatric: She has a normal mood and affect.   Nursing note and vitals reviewed.      Assessment:       1. Rhinosinusitis        Plan:       Georgie was seen today for fatigue, cough and  chills.    Diagnoses and all orders for this visit:    Rhinosinusitis - favor allergic reaction vs URI.  Trial of OTC antihistamines; consider benadryl at night.  Time, rest, fluids.    rtc prn    KAILYN Fishman MD MPH  Staff Internist

## 2019-09-20 NOTE — TELEPHONE ENCOUNTER
Called and informed Patient that her MRI is normal.   [facility]    HISTORY AND PHYSICAL       ATTENDING PHYSICIAN:  Sandra Moody MD  PRIMARY CARE PHYSICIAN:  Kasey Hernandez DO    CODE STATUS:  Prior    CHIEF COMPLAINT/REASON FOR ADMISSION:  Hemoptysis with left lung mass     HISTORY OF PRESENT ILLNESS:    Ellie Mckenzie is a 61year old male presenting with left lung mass and hemoptysis. MEDICATIONS PRIOR TO ADMISSION:    (Not in a hospital admission)      ALLERGIES:    ALLERGIES:   Allergen Reactions   â¢ Penicillins Other (See Comments)       PAST MEDICAL HISTORY:    Past Medical History:   Diagnosis Date   â¢ Alcohol abuse    â¢ Anxiety    â¢ Arthritis    â¢ CAD (coronary artery disease)    â¢ Depression    â¢ HTN (hypertension)    â¢ Myocardial infarction (CMS/HCC)     stent placed   â¢ Nephrolithiasis    â¢ PONV (postoperative nausea and vomiting)    â¢ Restless leg syndrome        SURGICAL HISTORY:    Past Surgical History:   Procedure Laterality Date   â¢ Appendectomy  08/05/2004   â¢ Cardiac catherization Left 08/13/2018    with possible PCI   â¢ Cataract extraction w/ intraocular lens implant  11/2014    right eye   â¢ Cystoscopy w/ laser lithotripsy     â¢ Eye surgery     â¢ Lung biopsy  09/20/2019   â¢ Vitrectomy  10/2012       FAMILY HISTORY:    Family History   Problem Relation Age of Onset   â¢ Alcohol Abuse Mother    â¢ Heart Mother    â¢ Stroke Mother    â¢ Alcohol Abuse Father    â¢ Hypertension Father    â¢ Cancer, Liver Father    â¢ Cancer Sister         Bladder cancer       SOCIAL HISTORY:    Social History     Tobacco Use   â¢ Smoking status: Current Every Day Smoker     Packs/day: 1.00     Years: 48.00     Pack years: 48.00     Types: Cigarettes   â¢ Smokeless tobacco: Never Used   â¢ Tobacco comment: 3 ciggs per day on 8/19/2019   Substance Use Topics   â¢ Alcohol use:  Yes     Alcohol/week: 4.2 standard drinks     Types: 5 Standard drinks or equivalent per week     Comment: hx of alcohol abuse   â¢ Drug use: No     Comment: History of "benzodiazepine addiction       REVIEW OF SYSTEMS:    Constitutional:  Patient denies fever, chills, tiredness or malaise. Respiratory:  +cough with hemoptysis  Cardiovascular:  Denies chest pain or edema. All other systems are reviewed and are negative except as documented in the HPI. PHYSICAL EXAM:    VITAL SIGNS:    Vital Last Value 24 Hour Range   Temperature 97.9 Â°F (36.6 Â°C) (09/20/19 1130) Temp  Min: 97.9 Â°F (36.6 Â°C)  Max: 97.9 Â°F (36.6 Â°C)   Pulse 66 (09/20/19 1130) Pulse  Min: 66  Max: 66   Respiratory   No data recorded   Non-Invasive  Blood Pressure 126/78 (09/20/19 1130) BP  Min: 126/78  Max: 126/78   Pulse Oximetry 98 % (09/20/19 1130) SpO2  Min: 98 %  Max: 98 %     Vital Today Admitted   Weight 72.8 kg (09/20/19 1113) Weight: 72.8 kg (09/20/19 1113)   Height N/A Height: 6' 2"" (188 cm) (09/20/19 1113)   BMI N/A BMI (Calculated): 20.61 (09/20/19 1113)     Constitutional:  The patient is alert, oriented and cooperative. Integument:  Warm. Dry. No erythema. No rash. HENT:  Normocephalic. Atraumatic. Bilateral external ears normal.   Neck: Normal range of motion. No tenderness. Supple. No stridor. Cardiovascular:  Normal heart rate. Normal rhythm. No murmurs. No rubs. No gallops. Respiratory:  Normal breath sounds. No respiratory distress. No wheezing. No chest tenderness.     LABORATORY DATA:    Lab Results   Component Value Date    SODIUM 139 08/13/2019    POTASSIUM 4.3 08/13/2019    CHLORIDE 100 08/13/2019    CO2 27 08/13/2019    BUN 10 08/13/2019    CREATININE 0.81 08/13/2019    GLUCOSE 78 08/13/2019    WBC 10.1 08/30/2019    HCT 43.3 08/30/2019    HGB 14.2 08/30/2019     08/30/2019    AST 33 08/13/2019    GPT 35 08/13/2019    ALKPT 150 (H) 08/13/2019    BILIRUBIN 0.5 08/13/2019      INR (no units)   Date Value   08/30/2019 0.9     TSH (mcUnits/mL)   Date Value   08/13/2019 2.281          IMAGING STUDIES:    CT Chest from 8/4/19    EKG INTERPRETATION:    Results for orders " placed or performed during the hospital encounter of 19   Electrocardiogram 12-Lead   Result Value Ref Range    Ventricular Rate EKG/Min (BPM) 60     Atrial Rate (BPM) 60     PA-Interval (MSEC) 166     QRS-Interval (MSEC) 98     QT-Interval (MSEC) 430     QTc 430     P Axis (Degrees) 83     R Axis (Degrees) 30     T Axis (Degrees) 74     REPORT TEXT       Normal sinus rhythm  Normal ECG  Confirmed by [facility] MD, [address] ([id number]) on 2019 9:06:36 PM         ASSESSMENT:    Left Lung Mass    PLAN:    Ct guided biopsy    (BELOW ALL DONE ON ORDER SETS-NEED TO BE IN NOTE?)    Admission Requirements and Anticipated Length of Stay:  The patient IS NOT expected to require a two midnight stay in the hospital.  Admission is required to provide services and treatment only available in the hospital.  Admission is supported by the followin. The documented complex medical factors and comorbidities. 2.  The severity of signs and symptoms. 3.  The current and anticipated ongoing medical needs. 4.  The potential risk for an adverse event or outcome.

## 2019-10-09 ENCOUNTER — PATIENT OUTREACH (OUTPATIENT)
Dept: ADMINISTRATIVE | Facility: OTHER | Age: 39
End: 2019-10-09

## 2019-10-18 ENCOUNTER — PATIENT OUTREACH (OUTPATIENT)
Dept: ADMINISTRATIVE | Facility: OTHER | Age: 39
End: 2019-10-18

## 2019-10-20 NOTE — PROGRESS NOTES
Chief Complaint   Patient presents with    Cramping     c/o craming off/on, pt states she normally has cyst. Feels some change in pelvis. Mother had ovarian CA very aware of chnages. Pt reports taking Sprionolactone at a different time than she use too. Takes med 3 hrs later. took several home UPT's all NEG. No cycle this month during week of placebo pills       HPI:  Georgie Wetzel is a 39 y.o. female patient  who presents today for evaluation of cramping.  For the past several weeks, she describes having intermittent midline pelvic cramping.  When she first started taking Spironolactone, she describes having pelvic cramping that lasted for about 3 months.  Recently, she started to take the Spironolactone at a different time of the day which coincides with onset of her current cramping.   She is taking Microgestin OCPs with only minimal monthly brown spotting.  However, she did not have any bleeding with her last pill pack.  She reports multiple negative home UPTs.  Hormone levels drawn 3/2018 were in the perimenopausal range.    Patient's last menstrual period was 2019.     UPT today: Negative    3/19/18 FSH 24.7,  LH 9.3    Pap 19: Negative, HPV negative    GC/CT 19: Negative    Past Medical History:   Diagnosis Date    Acne     Depression     treated with medication last use , situational     GERD (gastroesophageal reflux disease)     High cholesterol     Retinal detachment        Past Surgical History:   Procedure Laterality Date     Retinal Tear OS        COLONOSCOPY N/A 2017    Procedure: COLONOSCOPY;  Surgeon: Vahid Lynn MD;  Location: [address]);  Service: Endoscopy;  Laterality: N/A;  after Aug 13 for hx of diverticulitis    CRYOTHERAPY      RETINAL DETACHMENT SURGERY      RPK  OU Dr Munoz    2008    TONSILLECTOMY, ADENOIDECTOMY           ROS:  GENERAL: Feeling well overall.   SKIN: Denies rash or lesions.   HEAD: Denies head injury or headache.    NODES: Denies enlarged lymph nodes.   CHEST: Denies chest pain or shortness of breath.   CARDIOVASCULAR: Denies palpitations or left sided chest pain.   ABDOMEN: Reports pelvic cramping.  No nausea, vomiting or rectal bleeding.   URINARY: No dysuria or hematuria.  REPRODUCTIVE: See HPI.   BREASTS: Denies pain, lumps, or nipple discharge.   HEMATOLOGIC: No easy bruisability or excessive bleeding.   MUSCULOSKELETAL: Denies joint pain or swelling.   NEUROLOGIC: Denies syncope or weakness.   PSYCHIATRIC: Denies depression.    PE:   (chaperone present during entire exam)  APPEARANCE: Well nourished, well developed, in no acute distress.  ABDOMEN: Soft. No tenderness or masses. No guarding.  No rebound.  No CVA tenderness.  VULVA: No lesions. Normal female genitalia.  URETHRAL MEATUS: Normal size and location, no lesions, no prolapse.  URETHRA: No masses, tenderness, prolapse or scarring.  VAGINA: Moist and well rugated, no abnormal discharge, no significant cystocele or rectocele.  CERVIX: No lesions and discharge.  No CMT.  UTERUS: Normal size, regular shape, mobile, non-tender, bladder base nontender.  ADNEXA: No masses, tenderness or CDS nodularity.  ANUS PERINEUM: Normal.    Diagnosis:  1. Pelvic cramping    2. Encounter for surveillance of contraceptive pills    3. Perimenopausal          PLAN:    Orders Placed This Encounter    C. trachomatis/N. gonorrhoeae by AMP DNA    US Pelvis Comp with Transvag NON-OB (xpd    Follicle stimulating hormone    Luteinizing hormone    POCT Urine Pregnancy       Patient was counseled today on her pelvic cramping and the various etiologies.  On exam today, she did not have any discomfort.  UPT today was negative.  GC/CT was performed to rule out an infectious etiology.  She will have a pelvic sono performed for evaluation of her uterus / adnexa.  She will also recheck FSH / LH at the end of her hormone free interval of her pill pack for re-evaluation of her perimenopausal  status.    Follow-up after imaging    Total time of visit: 20 minutes (counseling >75% of time)

## 2019-10-21 ENCOUNTER — OFFICE VISIT (OUTPATIENT)
Dept: OBSTETRICS AND GYNECOLOGY | Facility: CLINIC | Age: 39
End: 2019-10-21
Attending: OBSTETRICS & GYNECOLOGY
Payer: COMMERCIAL

## 2019-10-21 ENCOUNTER — PATIENT MESSAGE (OUTPATIENT)
Dept: OBSTETRICS AND GYNECOLOGY | Facility: CLINIC | Age: 39
End: 2019-10-21

## 2019-10-21 VITALS
WEIGHT: 124 LBS | DIASTOLIC BLOOD PRESSURE: 70 MMHG | BODY MASS INDEX: 20.66 KG/M2 | HEIGHT: 65 IN | SYSTOLIC BLOOD PRESSURE: 120 MMHG

## 2019-10-21 DIAGNOSIS — N95.1 PERIMENOPAUSAL: ICD-10-CM

## 2019-10-21 DIAGNOSIS — Z30.41 ENCOUNTER FOR SURVEILLANCE OF CONTRACEPTIVE PILLS: ICD-10-CM

## 2019-10-21 DIAGNOSIS — R10.2 PELVIC CRAMPING: Primary | ICD-10-CM

## 2019-10-21 LAB
B-HCG UR QL: NEGATIVE
C TRACH DNA SPEC QL NAA+PROBE: NOT DETECTED
CTP QC/QA: YES
N GONORRHOEA DNA SPEC QL NAA+PROBE: NOT DETECTED

## 2019-10-21 PROCEDURE — 99213 OFFICE O/P EST LOW 20 MIN: CPT | Mod: S$GLB,,, | Performed by: OBSTETRICS & GYNECOLOGY

## 2019-10-21 PROCEDURE — 3008F BODY MASS INDEX DOCD: CPT | Mod: CPTII,S$GLB,, | Performed by: OBSTETRICS & GYNECOLOGY

## 2019-10-21 PROCEDURE — 87491 CHLMYD TRACH DNA AMP PROBE: CPT

## 2019-10-21 PROCEDURE — 3008F PR BODY MASS INDEX (BMI) DOCUMENTED: ICD-10-PCS | Mod: CPTII,S$GLB,, | Performed by: OBSTETRICS & GYNECOLOGY

## 2019-10-21 PROCEDURE — 99999 PR PBB SHADOW E&M-EST. PATIENT-LVL III: ICD-10-PCS | Mod: PBBFAC,,, | Performed by: OBSTETRICS & GYNECOLOGY

## 2019-10-21 PROCEDURE — 99999 PR PBB SHADOW E&M-EST. PATIENT-LVL III: CPT | Mod: PBBFAC,,, | Performed by: OBSTETRICS & GYNECOLOGY

## 2019-10-21 PROCEDURE — 81025 URINE PREGNANCY TEST: CPT | Mod: S$GLB,,, | Performed by: OBSTETRICS & GYNECOLOGY

## 2019-10-21 PROCEDURE — 81025 POCT URINE PREGNANCY: ICD-10-PCS | Mod: S$GLB,,, | Performed by: OBSTETRICS & GYNECOLOGY

## 2019-10-21 PROCEDURE — 99213 PR OFFICE/OUTPT VISIT, EST, LEVL III, 20-29 MIN: ICD-10-PCS | Mod: S$GLB,,, | Performed by: OBSTETRICS & GYNECOLOGY

## 2019-10-21 RX ORDER — BIMATOPROST 3 UG/ML
SOLUTION TOPICAL
Refills: 1 | COMMUNITY
Start: 2019-09-23

## 2019-10-22 ENCOUNTER — PATIENT MESSAGE (OUTPATIENT)
Dept: OBSTETRICS AND GYNECOLOGY | Facility: CLINIC | Age: 39
End: 2019-10-22

## 2020-01-02 ENCOUNTER — TELEPHONE (OUTPATIENT)
Dept: ORTHOPEDICS | Facility: CLINIC | Age: 40
End: 2020-01-02

## 2020-01-02 NOTE — TELEPHONE ENCOUNTER
Spoke with pt.  Reports shoulder pain times several weeks.   Works out a lot.   Shoulder pain is radiating to her neck occasionally.  Rescheduled pt with sports for evaluation

## 2020-01-03 ENCOUNTER — PATIENT OUTREACH (OUTPATIENT)
Dept: ADMINISTRATIVE | Facility: OTHER | Age: 40
End: 2020-01-03

## 2020-01-06 ENCOUNTER — HOSPITAL ENCOUNTER (OUTPATIENT)
Dept: RADIOLOGY | Facility: HOSPITAL | Age: 40
Discharge: HOME | End: 2020-01-06
Attending: PHYSICIAN ASSISTANT
Payer: COMMERCIAL

## 2020-01-06 ENCOUNTER — OFFICE VISIT (OUTPATIENT)
Dept: SPORTS MEDICINE | Facility: CLINIC | Age: 40
End: 2020-01-06
Payer: COMMERCIAL

## 2020-01-06 VITALS
HEIGHT: 65 IN | BODY MASS INDEX: 20.66 KG/M2 | SYSTOLIC BLOOD PRESSURE: 123 MMHG | WEIGHT: 124 LBS | HEART RATE: 66 BPM | DIASTOLIC BLOOD PRESSURE: 75 MMHG

## 2020-01-06 DIAGNOSIS — M54.2 NECK PAIN: ICD-10-CM

## 2020-01-06 DIAGNOSIS — M54.12 CERVICAL RADICULOPATHY: Primary | ICD-10-CM

## 2020-01-06 DIAGNOSIS — S46.812A STRAIN OF LEFT TRAPEZIUS MUSCLE, INITIAL ENCOUNTER: ICD-10-CM

## 2020-01-06 DIAGNOSIS — M25.512 LEFT SHOULDER PAIN, UNSPECIFIED CHRONICITY: ICD-10-CM

## 2020-01-06 PROCEDURE — 99204 OFFICE O/P NEW MOD 45 MIN: CPT | Mod: S$GLB,,, | Performed by: PHYSICIAN ASSISTANT

## 2020-01-06 PROCEDURE — 72040 XR CERVICAL SPINE AP LATERAL: ICD-10-PCS | Mod: 26,,, | Performed by: RADIOLOGY

## 2020-01-06 PROCEDURE — 3008F PR BODY MASS INDEX (BMI) DOCUMENTED: ICD-10-PCS | Mod: CPTII,S$GLB,, | Performed by: PHYSICIAN ASSISTANT

## 2020-01-06 PROCEDURE — 73030 X-RAY EXAM OF SHOULDER: CPT | Mod: 26,LT,, | Performed by: RADIOLOGY

## 2020-01-06 PROCEDURE — 72040 X-RAY EXAM NECK SPINE 2-3 VW: CPT | Mod: TC

## 2020-01-06 PROCEDURE — 99204 PR OFFICE/OUTPT VISIT, NEW, LEVL IV, 45-59 MIN: ICD-10-PCS | Mod: S$GLB,,, | Performed by: PHYSICIAN ASSISTANT

## 2020-01-06 PROCEDURE — 99999 PR PBB SHADOW E&M-EST. PATIENT-LVL III: CPT | Mod: PBBFAC,,, | Performed by: PHYSICIAN ASSISTANT

## 2020-01-06 PROCEDURE — 72040 X-RAY EXAM NECK SPINE 2-3 VW: CPT | Mod: 26,,, | Performed by: RADIOLOGY

## 2020-01-06 PROCEDURE — 73030 X-RAY EXAM OF SHOULDER: CPT | Mod: TC,LT

## 2020-01-06 PROCEDURE — 73030 XR SHOULDER COMPLETE 2 OR MORE VIEWS LEFT: ICD-10-PCS | Mod: 26,LT,, | Performed by: RADIOLOGY

## 2020-01-06 PROCEDURE — 3008F BODY MASS INDEX DOCD: CPT | Mod: CPTII,S$GLB,, | Performed by: PHYSICIAN ASSISTANT

## 2020-01-06 PROCEDURE — 99999 PR PBB SHADOW E&M-EST. PATIENT-LVL III: ICD-10-PCS | Mod: PBBFAC,,, | Performed by: PHYSICIAN ASSISTANT

## 2020-01-06 RX ORDER — METHYLPREDNISOLONE 4 MG/1
TABLET ORAL
Qty: 1 PACKAGE | Refills: 0 | Status: SHIPPED | OUTPATIENT
Start: 2020-01-06 | End: 2020-01-27

## 2020-01-06 NOTE — PROGRESS NOTES
CC: LEFT shoulder pain     39 y.o. Female presents as a new patient to me. LEFT shoulder pain x about 2 months. Atraumatic onset.  She states that she noticed the pain initially while doing high intensity exercises.  She states that the pain started around the middle of her scapula, however now she experiences pain/tenderness from the left side of her neck down to occasionally her elbow.  Pain described as soreness/throbbing, 7/10 when present.  Pain is worse with high-intensity exercising, direct pressure of lateral neck. It also bothers her at night. Better with rest.  Treatment thus far has included activity modifications, rest, and oral medication.  Here today to discuss diagnosis and treatment options.          PAST MEDICAL HISTORY:   Past Medical History:   Diagnosis Date    Acne     Depression     treated with medication last use 2010, situational     GERD (gastroesophageal reflux disease)     High cholesterol     Retinal detachment        PAST SURGICAL HISTORY:  Past Surgical History:   Procedure Laterality Date     Retinal Tear OS  2005      COLONOSCOPY N/A 8/25/2017    Procedure: COLONOSCOPY;  Surgeon: Vahid Lynn MD;  Location: [facility] ([address] [place] [address]);  Service: Endoscopy;  Laterality: N/A;  after Aug 13 for hx of diverticulitis    CRYOTHERAPY      RETINAL DETACHMENT SURGERY      RPK  OU Dr Munoz    7/2008    TONSILLECTOMY, ADENOIDECTOMY         FAMILY HISTORY:  Family History   Problem Relation Age of Onset    Acne Brother     Ovarian cancer Mother     Diabetes Maternal Grandmother     Glaucoma Maternal Grandfather     Cataracts Maternal Grandfather     Stroke Paternal Grandmother     Scleroderma Paternal Grandmother     Atrial fibrillation Father     Hyperlipidemia Father     Melanoma Neg Hx     Breast cancer Neg Hx        MEDICATIONS:    Current Outpatient Medications:     bimatoprost (LATISSE) 0.03 % ophthalmic solution, USE AS DIRECTED, Disp: , Rfl: 1    co-enzyme Q-10 50 mg  capsule, Take 50 mg by mouth once daily., Disp: , Rfl:     LACTOBACILLUS ACIDOPHILUS (ACIDOPHILUS ORAL), Take by mouth., Disp: , Rfl:     MAGNESIUM ORAL, Take 500 mg by mouth once. , Disp: , Rfl:     norethindrone-ethinyl estradiol (MICROGESTIN 1/20) 1-20 mg-mcg per tablet, Take 1 tablet by mouth once daily., Disp: 63 tablet, Rfl: 4    omeprazole (PRILOSEC OTC) 20 MG tablet, Take 40 mg by mouth once daily. , Disp: , Rfl:     ondansetron (ZOFRAN-ODT) 4 MG TbDL, DISSOLVE 1 TABLET IN MOUTH EVERY 6 HOURS AS NEEDED FOR NAUSEA., Disp: , Rfl: 3    riboflavin, vitamin B2, 400 mg Tab, Take 1 capsule by mouth once daily., Disp: , Rfl:     rizatriptan (MAXALT-MLT) 5 MG disintegrating tablet, Dissolve 1 tab on tongue at onset of migraine, may repeat in 2 hours if needed. Max 3 tabs/day. Max 3 days/week., Disp: 12 tablet, Rfl: 5    spironolactone (ALDACTONE) 100 MG tablet, Take 100 mg by mouth once daily. , Disp: , Rfl:     tiZANidine (ZANAFLEX) 2 MG tablet, TAKE 1 TO 2 TABLETS BY MOUTH AT BEDTIME. NO DRIVING WHILE ON THIS MED. Will need appt for further refills., Disp: 60 tablet, Rfl: 4    ZIANA gel, APPLY A THIN FILM TO FACE AT BEDTIME, Disp: , Rfl: 6    ALLERGIES:  Review of patient's allergies indicates:   Allergen Reactions    No known drug allergies         REVIEW OF SYSTEMS:  Constitution: Negative. Negative for chills, fever and night sweats.    Hematologic/Lymphatic: Negative for bleeding problem. Does not bruise/bleed easily.   Skin: Negative for dry skin, itching and rash.   Musculoskeletal: Negative for falls. Positive for left shoulder pain and muscle weakness.     All other review of symptoms were reviewed and found to be noncontributory.    PHYSICAL EXAMINATION:  Vitals:  There were no vitals taken for this visit.   General: Well-developed well-nourished 39 y.o. femalein no acute distress   Cardiovascular: Regular rhythm by palpation of distal pulse, normal color and temperature, no concerning  varicosities on symptomatic side   Lungs: No labored breathing or wheezing appreciated   Neuro: Alert and oriented ×3   Psychiatric: well oriented to person, place and time, demonstrates normal mood and affect   Skin: No rashes, lesions or ulcers, normal temperature, turgor, and texture on uninvolved extremity    Ortho/SPM Exam  active forward elevation to 170, ER with arm at side to 90 IR to T4. Passive FE to 170, ER to 90. No prominent tenderness along the proximal biceps tendon. Negative AC tenderness. 5/5 resisted supraspinatus testing. 5/5 resisted infraspinatus testing. Negative belly press test. Stable shoulder. No midline neck tenderness. Negative Spurling's maneuver. TTP over left trapezius muscle.    IMAGING:  Xrays including AP, Outlet and Axillary Lateral of Left shoulder are ordered / images reviewed by me:   No fracture or acute change appreciated. No significant glenohumeral degenerative changes. Mild to moderate AC joint arthritis. Normal acromiohumeral distance.     ASSESSMENT:      ICD-10-CM ICD-9-CM   1. Cervical radiculopathy M54.12 723.4   2. Left shoulder pain, unspecified chronicity M25.512 719.41   3. Neck pain M54.2 723.1   4. Strain of left trapezius muscle, initial encounter S46.812A 840.8       PLAN:     -Findings and treatment options were discussed with the patient  -based on her description of symptoms I do believe she has some cervical radiculopathy along with a strain of her trapezius muscle, I will prescribe Medrol Dosepak.  -I have also placed a referral for physical therapy for strain of left trapezius muscle an cervical radiculopathy  -I advised that she should withhold from all high intensity exercises at this time until evaluated by physical therapy.  -RTC 4 weeks, if symptoms persist I will discuss with back and spine group for further treatment.  -All questions answered      Procedures

## 2020-01-07 ENCOUNTER — CLINICAL SUPPORT (OUTPATIENT)
Dept: REHABILITATION | Facility: OTHER | Age: 40
End: 2020-01-07
Payer: COMMERCIAL

## 2020-01-07 DIAGNOSIS — M25.512 ACUTE PAIN OF LEFT SHOULDER: ICD-10-CM

## 2020-01-07 DIAGNOSIS — G89.29 CHRONIC TMJ PAIN: ICD-10-CM

## 2020-01-07 DIAGNOSIS — M26.629 CHRONIC TMJ PAIN: ICD-10-CM

## 2020-01-07 DIAGNOSIS — M54.2 NECK PAIN: ICD-10-CM

## 2020-01-07 DIAGNOSIS — R29.3 POSTURE IMBALANCE: ICD-10-CM

## 2020-01-07 PROCEDURE — 97162 PT EVAL MOD COMPLEX 30 MIN: CPT | Mod: PN | Performed by: PHYSICAL THERAPIST

## 2020-01-07 PROCEDURE — 97140 MANUAL THERAPY 1/> REGIONS: CPT | Mod: PN | Performed by: PHYSICAL THERAPIST

## 2020-01-07 NOTE — PROGRESS NOTES
Physical Therapy Dry Needling Note     Name: Georgie Wetzel  Clinic Number: [id number]  Therapy Diagnosis:   Encounter Diagnoses   Name Primary?    Neck pain     Acute pain of left shoulder     Posture imbalance     Chronic TMJ pain      Physician: Syed Carbajal PA*    Visit Date: 1/7/2020    Time In: 4: 40 PM  Time Out: 5:00 PM  Total Billable Time: 20 minutes    Subjective: Patient reports pain to mid-neck and L shoulder blade.    Objective:    Application of trigger point dry needling: Pt educated on benefits and potential side effects of dry needling. Educated pt on benefits, precautions, side effects following dry needling. Educated pt to use heat following treatment sessions if pt is experiencing pain or soreness. Pt verbalized good understanding of education.  Pt signed written consent to dry needling Rx. Pt gave verbal consent for DN    Pt received dry needling to the below listed muscles using 30 and 40 mm needles.  L UT, LS, suboccipitals (GB20), C4 PVM.  Winding performed every 5 minutes during treatment.      Assessment:    Good soft tissue response to dry needling evident by increased grasp with unilateral winding at all insertion points. Winding performed every 5 minutes during treatment. No adverse effects following treatment.    Plan:  Continue per plan of care. Dry needling as needed to decrease pain.    Ping Vaughn, PT  1/7/2020

## 2020-01-07 NOTE — PLAN OF CARE
"[facility]  Physical Therapy Initial Evaluation    Name: Georgie Wetzel  Clinic Number: [id number]    Therapy Diagnosis:   Encounter Diagnoses   Name Primary?    Neck pain     Acute pain of left shoulder     Posture imbalance     Chronic TMJ pain      Physician: Syed Carbajal PA*    Physician Orders: PT Eval and Treat 2 x 4 weeks  Medical Diagnosis from Referral: M54.12 (ICD-10-CM) - Cervical radiculopathy S46.812A (ICD-10-CM) - Strain of left trapezius muscle, initial encounter   Evaluation Date: 1/7/2020  Authorization Period Expiration: 1/5/2021   Plan of Care Expiration: 2/7/2020  Visit # / Visits authorized: 1/ 1 (further auth pending)    Time In: 4:00 PM  Time Out: 5:00 PM  Total Billable Time: 60 minutes    Precautions: Standard    Subjective   Date of onset: 1 month ago  History of current condition - Georgie reports: she works out at least twice a day ([employer], [employer], [employer]). About a month ago she noticed a feeling of a "knot" to L shoulder blade with turning head. Over the past few weeks she reports noticing that pain daily, sometimes radiating into shoulder or arm, often accompanied by headache. She has also been noticing more headaches with exercise. Reports pain to L shoulder with lifting weights. She says she is still exercising, but has been doing lighter weights. She had a massage with cupping a couple of weeks ago, which helped to reduce shoulder blade pain but she's been having pain to neck. Also reports history of TMJ problems.        Past Medical History:   Diagnosis Date    Acne     Depression     treated with medication last use 2010, situational     GERD (gastroesophageal reflux disease)     High cholesterol     Retinal detachment      Georgie Wetzel  has a past surgical history that includes TONSILLECTOMY, ADENOIDECTOMY;  Retinal Tear OS  2005; RPK  [facility] Dr Munoz   (7/2008); Retinal detachment surgery; Cryotherapy; and Colonoscopy (N/A, " 8/25/2017).    Georgie has a current medication list which includes the following prescription(s): bimatoprost, co-enzyme q-10, lactobacillus acidophilus, magnesium, methylprednisolone, norethindrone-ethinyl estradiol, omeprazole, ondansetron, riboflavin (vitamin b2), rizatriptan, spironolactone, tizanidine, and ziana.    Review of patient's allergies indicates:   Allergen Reactions    No known drug allergies         Imaging, bone scan films:   FINDINGS:  There is normal alignment to the cervical spine.  No fracture or dislocation is seen.  No significant degenerative changes are seen.    Visualized lung fields and soft tissues are unremarkable.      Impression       No acute fracture or dislocation.  No significant degenerative change.     FINDINGS:  Bones: No fracture.  No lytic or blastic lesion.    Joints: No evidence for glenohumeral dislocation.  Acromioclavicular joint is unremarkable.    Soft tissues: Unremarkable.      Impression       No acute fracture or dislocation.         Prior Therapy: none  Social History: Pt is extremely active, exercises 2x/day  Occupation:   Prior Level of Function: I with ADL's and driving, very active  Current Level of Function: I with ADL's, difficulty with prolonged computer use, decreasing weight with exercise    Pain:  Current 1/10, worst 6/10, best 0/10   Location: left neck and shoulder blade  Description: Aching  Aggravating Factors: looking left, prolonged computer use, overhead lifting with L, exercise  Easing Factors: heating pad and rest, Aleve    Pts goals: be able to sit without pain, return to full exercise without pain    Objective     Observation: pt is alert and oriented, good historian    Posture: unsupported sitting, forward head, rounded shoulders    Cervical Range of Motion:    % WFL Pain   Flexion WFL Tightness to suboccipitals     Extension WFL Pain mid CSP     Right Rotation WFL      Left Rotation WFL Reports pain with prolonged position     Right  Side Bending 75% Tightness L   Left Side Bending 75% Tightness R      Shoulder Range of Motion: grossly WFL and equal B      Upper Extremity Strength  (R) UE  (L) UE    Shoulder flexion: 5/5 Shoulder flexion: 5/5   Shoulder Abduction: 5/5 Shoulder abduction: 5/5   Shoulder ER 5/5 Shoulder ER 4+/5   Shoulder IR 5/5 Shoulder IR 4+/5   Elbow flexion: 5/5 Elbow flexion: 5/5   Elbow extension: 5/5 Elbow extension: 5/5   Lower Trap 4-/5 Lower Trap 4-/5   Rhomboids 4-/5 Rhomboids 4-/5       TMJ: deviation with opening, and cavitation noted (L>R). Tenderness and guarding to masseter and pterygoids (L>R)    Joint Mobility: mild stiffness with PT to C3-5    Palpation: tenderness to L upper trap, levator scap, suboccipitals, PVM along mid cervical spine (L>R)      Sensation: grossly intact to light touch B UE    Flexibility: mild restrictions to B upper trap, levator, and suboccipital mm          CMS Impairment/Limitation/Restriction for FOTO Neck Survey    Therapist reviewed FOTO scores for Georgie Wetzel on 1/7/2020.   FOTO documents entered into [employer] - see Media section.    Limitation Score: 25%    Goal: 19%         TREATMENT   Treatment Time In: 4:40 PM  Treatment Time Out: 5:00 PM  Total Treatment time separate from Evaluation: 20 minutes    Georgie received therapeutic exercises to develop posture:  Reviewed for HEP:  Chin tucks and scap squeezes    Georgie received the following manual therapy techniques: 20 min x dry needling, see note from Ping Vaughn, PT      Home Exercises and Patient Education Provided    Education provided:   - Therapy rationale and plan of care. Consent for dry needling reviewed in detail prior to treatment. Pt educated on use of heat as needed for post-needle soreness. Pt was also educated regarding desk ergonomics for improved posture with work.     Written Home Exercises Provided: yes.  Exercises were reviewed and Georgie was able to demonstrate them prior to the end of the session.  Georgie  demonstrated good  understanding of the education provided.     See EMR under Patient Instructions for exercises provided 1/7/2020.    Assessment   Georgie is a 39 y.o. female referred to outpatient Physical Therapy with a medical diagnosis of M54.12 (ICD-10-CM) - Cervical radiculopathy S46.812A (ICD-10-CM) - Strain of left trapezius muscle, initial encounter. Pt presents with pain to neck and L scapula for the past month, exacerbated with frequent high intensity exercise. Pt presents with weakness to L shoulder and B periscapular mm with MMT. Cervical motion is functional, but pain in end range flexion, extension, and lateral bending. Pt reports difficulty with prolonged computer work consistent with postural weakness. Pt also reports chronic history of TMJ pain with abnormal motion and cavitation noted with jaw opening.   Dry needling iniated today with pt's written and verbal consent, see note. Pt reports relief following treatment.     Pt prognosis is Excellent.   Pt will benefit from skilled outpatient Physical Therapy to address the deficits stated above and in the chart below, provide pt/family education, and to maximize pt's level of independence.     Plan of care discussed with patient: Yes  Pt's spiritual, cultural and educational needs considered and patient is agreeable to the plan of care and goals as stated below:     Anticipated Barriers for therapy: work schedule, activity level    Medical Necessity is demonstrated by the following  History  Co-morbidities and personal factors that may impact the plan of care Co-morbidities:   coping style/mechanism and depression    Personal Factors:   coping style  lifestyle  attitudes     moderate   Examination  Body Structures and Functions, activity limitations and participation restrictions that may impact the plan of care Body Regions:   head  neck  back    Body Systems:    gross symmetry  ROM  strength    Participation Restrictions:   Sitting, prolonged  cervical rotation, high intensity exercise, prolonged computer use, overhead lifting    Activity limitations:   Learning and applying knowledge  no deficits    General Tasks and Commands  no deficits    Communication  no deficits    Mobility  lifting and carrying objects    Self care  no deficits    Domestic Life  no deficits    Interactions/Relationships  no deficits    Life Areas  employment    Community and Social Life  community life  recreation and leisure         moderate   Clinical Presentation evolving clinical presentation with changing clinical characteristics moderate   Decision Making/ Complexity Score: moderate     Goals:  Short Term Goals (4 Weeks):   1. Pt will report 20% reduction in pain of the cervical spine and LUE for ease with computer work.  2. Pt will demonstrate 1/3 MMT improvement in periscapular strength for ease with upright posture  3. Pt will report modifications made to desk set up to improve ergonomics and posture with typing.      Long Term Goals (12 Weeks):   1. Pt will report being independent with HEP for maintenance of improvements gained during therapy sessions  2. PT will report 50% reduction of pain of the neck and LUE for ease with performing job tasks.  3. Pt will demonstrate full BUE and periscapular strength without the provocation of pain for ease with return to full gym exercise routine at [facility].  4. Pt will demonstrate appropriate upright posture without external cueing for ease with computer work.     Plan   Plan of care Certification: 1/7/2020 to 2/7/2020.    Outpatient Physical Therapy 2 times weekly for 4 weeks to include the following interventions: Cervical/Lumbar Traction, Manual Therapy, Moist Heat/ Ice, Neuromuscular Re-ed, Patient Education, Therapeutic Exercise and Dry Needling.     Ping Vaughn, PT

## 2020-01-09 ENCOUNTER — CLINICAL SUPPORT (OUTPATIENT)
Dept: REHABILITATION | Facility: OTHER | Age: 40
End: 2020-01-09
Payer: COMMERCIAL

## 2020-01-09 DIAGNOSIS — R29.3 POSTURE IMBALANCE: ICD-10-CM

## 2020-01-09 DIAGNOSIS — M26.629 CHRONIC TMJ PAIN: ICD-10-CM

## 2020-01-09 DIAGNOSIS — M54.2 NECK PAIN: ICD-10-CM

## 2020-01-09 DIAGNOSIS — G89.29 CHRONIC TMJ PAIN: ICD-10-CM

## 2020-01-09 DIAGNOSIS — M25.512 ACUTE PAIN OF LEFT SHOULDER: ICD-10-CM

## 2020-01-09 PROCEDURE — 97140 MANUAL THERAPY 1/> REGIONS: CPT | Mod: PN | Performed by: PHYSICAL THERAPIST

## 2020-01-09 PROCEDURE — 97110 THERAPEUTIC EXERCISES: CPT | Mod: PN | Performed by: PHYSICAL THERAPIST

## 2020-01-09 NOTE — PROGRESS NOTES
"  Physical Therapy Daily Treatment Note     Name: Georgie RUIZ [facility]  Clinic Number: [id number]    Therapy Diagnosis:   Encounter Diagnoses   Name Primary?    Neck pain     Acute pain of left shoulder     Posture imbalance     Chronic TMJ pain      Physician: Syed Carbajal PA*    Visit Date: 1/9/2020    Physician Orders: PT Eval and Treat 2 x 4 weeks  Medical Diagnosis from Referral: M54.12 (ICD-10-CM) - Cervical radiculopathy S46.812A (ICD-10-CM) - Strain of left trapezius muscle, initial encounter   Evaluation Date: 1/7/2020  Authorization Period Expiration: 1/5/2021   Plan of Care Expiration: 2/7/2020  Visit # / Visits authorized: 2/ 1 (further auth pending)       Time In: 9:05 AM  Time Out: 10:00  Total Billable Time: 55 minutes    Precautions: Standard    Subjective     Pt reports: she felt some relief with the dry needling. She says she's decided to modify her workouts to be more Pilates and Ride because she found the pacing of the other classes, particularly things that are in more quadruped like Bosu, was just aggravating her pain too much. She reports getting some items to start modifying her desk set up to improve ergonomics. She says she's noticed a little bit of difference in her posture with doing her HEP.   She was compliant with home exercise program.  Response to previous treatment: relief with dry needling  Functional change: modifying exercise routine    Pain: 1/10  Location:  left neck and shoulder blade      Objective     Georgie received therapeutic exercises to develop strength, endurance, ROM, flexibility, posture and core stabilization for 40 minutes including:  UT stretch 3 x 30"  1/2 foam pec stretch x 2 min  1/2 foam supine punch 20x  1/2 foam horiz abd/add 20x  1/2 foam alt flex/ext 20x  1/2 foam flasher 20x OTB  Prone scap squeeze 10" x 10  Prone Y/T/A 3" x 20    Georgie received the following manual therapy techniques: Soft tissue Mobilization were applied to the: neck and shoulders " for 15 minutes, including:    Application of TDN: Pt educated on benefits and potential side effects of dry needling. Educated pt on benefits, precautions, side effects following TDN. Educated pt to use heat following treatment sessions if pt is experiencing pain or soreness. Pt verbalized good understanding of education.  Pt signed written consent to dry needling. Pt gave verbal consent for DN    Pt received dry needling to the below listed muscles using 40 mm needles.  L UT, infraspinatus, C7; R UT. Winding performed every 5 minutes during treatment.          Home Exercises Provided and Patient Education Provided     Education provided:   - Therex rationale    Written Home Exercises Provided: Patient instructed to cont prior HEP.  Exercises were reviewed and Georgie was able to demonstrate them prior to the end of the session.  Georgie demonstrated good  understanding of the education provided.     See EMR under Patient Instructions for exercises provided 1/7/2020.    Assessment     Good tolerance to initial treatment session today. Tactile cuing to decrease UT recruitment with prone exercises with good return demonstration. Good soft tissue response to dry needling evident by increased grasp with unilateral winding at all insertion points. Winding performed every 5 minutes during treatment. No adverse effects following treatment.    Georgie is progressing well towards her goals.   Pt prognosis is Excellent.     Pt will continue to benefit from skilled outpatient physical therapy to address the deficits listed in the problem list box on initial evaluation, provide pt/family education and to maximize pt's level of independence in the home and community environment.     Pt's spiritual, cultural and educational needs considered and pt agreeable to plan of care and goals.     Anticipated barriers to physical therapy:  work schedule, activity level     Goals: IE 1/7/2020  Short Term Goals (4 Weeks):   1. Pt will report  20% reduction in pain of the cervical spine and LUE for ease with computer work. Progressing, not met  2. Pt will demonstrate 1/3 MMT improvement in periscapular strength for ease with upright posture. Progressing, not met  3. Pt will report modifications made to desk set up to improve ergonomics and posture with typing.  Progressing, not met     Long Term Goals (12 Weeks):   1. Pt will report being independent with HEP for maintenance of improvements gained during therapy sessions. Progressing, not met  2. PT will report 50% reduction of pain of the neck and LUE for ease with performing job tasks. Progressing, not met  3. Pt will demonstrate full BUE and periscapular strength without the provocation of pain for ease with return to full gym exercise routine at [facility]. Progressing, not met  4. Pt will demonstrate appropriate upright posture without external cueing for ease with computer work.  Progressing, not met      Plan   Plan of care Certification: 1/7/2020 to 2/7/2020     Continue plan of care with focus on improving postural strength and stability. Continue dry needling as needed for pain relief and improved soft tissue mobility.     Ping Vaughn, PT

## 2020-01-12 RX ORDER — NORETHINDRONE ACETATE AND ETHINYL ESTRADIOL .02; 1 MG/1; MG/1
TABLET ORAL
Qty: 63 TABLET | Refills: 4 | Status: SHIPPED | OUTPATIENT
Start: 2020-01-12 | End: 2020-03-10 | Stop reason: SDUPTHER

## 2020-01-13 ENCOUNTER — CLINICAL SUPPORT (OUTPATIENT)
Dept: REHABILITATION | Facility: OTHER | Age: 40
End: 2020-01-13
Payer: COMMERCIAL

## 2020-01-13 DIAGNOSIS — M54.2 NECK PAIN: ICD-10-CM

## 2020-01-13 DIAGNOSIS — R29.3 POSTURE IMBALANCE: ICD-10-CM

## 2020-01-13 DIAGNOSIS — M25.512 ACUTE PAIN OF LEFT SHOULDER: ICD-10-CM

## 2020-01-13 DIAGNOSIS — M26.629 CHRONIC TMJ PAIN: ICD-10-CM

## 2020-01-13 DIAGNOSIS — G89.29 CHRONIC TMJ PAIN: ICD-10-CM

## 2020-01-13 PROCEDURE — 97140 MANUAL THERAPY 1/> REGIONS: CPT | Mod: PN | Performed by: PHYSICAL THERAPIST

## 2020-01-13 PROCEDURE — 97110 THERAPEUTIC EXERCISES: CPT | Mod: PN | Performed by: PHYSICAL THERAPIST

## 2020-01-13 NOTE — PROGRESS NOTES
"  Physical Therapy Daily Treatment Note     Name: Georgie RUIZ [facility]  Clinic Number: [id number]    Therapy Diagnosis:   Encounter Diagnoses   Name Primary?    Neck pain     Acute pain of left shoulder     Posture imbalance     Chronic TMJ pain      Physician: Syed Carbajal PA*    Visit Date: 1/13/2020    Physician Orders: PT Eval and Treat 2 x 4 weeks  Medical Diagnosis from Referral: M54.12 (ICD-10-CM) - Cervical radiculopathy S46.812A (ICD-10-CM) - Strain of left trapezius muscle, initial encounter   Evaluation Date: 1/7/2020  Authorization Period Expiration: 1/5/2021   Plan of Care Expiration: 2/7/2020  Visit # / Visits authorized: 3/ 1 (further auth pending)       Time In: 2:10 PM  Time Out: 3:00 PM  Total Billable Time: 50 minutes    Precautions: Standard    Subjective     Pt reports: feeling improvement overall. She says she's still had some flare-ups with sitting at dinner and exercise, but pain resolves more quickly.    She was compliant with home exercise program.  Response to previous treatment: "I had more soreness at first, but then felt more relief."  Functional change: modifying exercise routine    Pain: 1/10  Location:  left neck and shoulder blade      Objective     Georgie received therapeutic exercises to develop strength, endurance, ROM, flexibility, posture and core stabilization for 35 minutes including:  UT stretch 3 x 30" - NP  1/2 foam pec stretch x 2 min  1/2 foam supine punch 20x with 2#  1/2 foam horiz abd/add 20x with 2#  1/2 foam alt flex/ext 20x with 2#  1/2 foam flasher 20x OTB  Prone scap squeeze 10" x 10  Prone Y/T/A 3" x 20    Georgie received the following manual therapy techniques: Soft tissue Mobilization were applied to the: neck and shoulders for 15 minutes, including:    Application of TDN: Pt educated on benefits and potential side effects of dry needling. Educated pt on benefits, precautions, side effects following TDN. Educated pt to use heat following treatment " sessions if pt is experiencing pain or soreness. Pt verbalized good understanding of education.  Pt signed written consent to dry needling. Pt gave verbal consent for DN    Pt received dry needling to the below listed muscles using 15 and 40 mm needles.  In sidelying: L UT, LS, rhomboid, TMJ (masseter, pterygoids, temporalis). Winding performed every 5 minutes during treatment.          Home Exercises Provided and Patient Education Provided     Education provided:   - Therex rationale    Written Home Exercises Provided: Patient instructed to cont prior HEP.  Exercises were reviewed and Georgie was able to demonstrate them prior to the end of the session.  Georgie demonstrated good  understanding of the education provided.     See EMR under Patient Instructions for exercises provided 1/7/2020.    Assessment     Good tolerance to treatment today. Tactile cuing to decrease UT recruitment with prone y's. Good soft tissue response to dry needling evident by increased grasp with unilateral winding at all insertion points. TMJ points added today with good tolerance. Winding performed every 5 minutes during treatment. No adverse effects following treatment.      Georgie is progressing well towards her goals.   Pt prognosis is Excellent.     Pt will continue to benefit from skilled outpatient physical therapy to address the deficits listed in the problem list box on initial evaluation, provide pt/family education and to maximize pt's level of independence in the home and community environment.     Pt's spiritual, cultural and educational needs considered and pt agreeable to plan of care and goals.     Anticipated barriers to physical therapy:  work schedule, activity level     Goals: IE 1/7/2020  Short Term Goals (4 Weeks):   1. Pt will report 20% reduction in pain of the cervical spine and LUE for ease with computer work. Progressing, not met  2. Pt will demonstrate 1/3 MMT improvement in periscapular strength for ease with  upright posture. Progressing, not met  3. Pt will report modifications made to desk set up to improve ergonomics and posture with typing.  Progressing, not met     Long Term Goals (12 Weeks):   1. Pt will report being independent with HEP for maintenance of improvements gained during therapy sessions. Progressing, not met  2. PT will report 50% reduction of pain of the neck and LUE for ease with performing job tasks. Progressing, not met  3. Pt will demonstrate full BUE and periscapular strength without the provocation of pain for ease with return to full gym exercise routine at [facility]. Progressing, not met  4. Pt will demonstrate appropriate upright posture without external cueing for ease with computer work.  Progressing, not met      Plan   Plan of care Certification: 1/7/2020 to 2/7/2020     Continue plan of care with focus on improving postural strength and stability. Continue dry needling as needed for pain relief and improved soft tissue mobility.     Ping Vaughn, PT

## 2020-01-15 ENCOUNTER — CLINICAL SUPPORT (OUTPATIENT)
Dept: REHABILITATION | Facility: OTHER | Age: 40
End: 2020-01-15
Payer: COMMERCIAL

## 2020-01-15 DIAGNOSIS — G89.29 CHRONIC TMJ PAIN: ICD-10-CM

## 2020-01-15 DIAGNOSIS — M26.629 CHRONIC TMJ PAIN: ICD-10-CM

## 2020-01-15 DIAGNOSIS — R29.3 POSTURE IMBALANCE: ICD-10-CM

## 2020-01-15 DIAGNOSIS — M54.2 NECK PAIN: ICD-10-CM

## 2020-01-15 DIAGNOSIS — M25.512 ACUTE PAIN OF LEFT SHOULDER: ICD-10-CM

## 2020-01-15 PROCEDURE — 97140 MANUAL THERAPY 1/> REGIONS: CPT | Mod: PN

## 2020-01-15 PROCEDURE — 97110 THERAPEUTIC EXERCISES: CPT | Mod: PN

## 2020-01-15 NOTE — PROGRESS NOTES
"Physical Therapy Daily Treatment Note      Name: Georgie Wetzel  Clinic Number: [id number]     Therapy Diagnosis:   Encounter Diagnoses   Name Primary?    Neck pain     Acute pain of left shoulder     Posture imbalance     Chronic TMJ pain         Physician: Syed Carbajal PA*     Visit Date: 1/15/2020     Physician Orders: PT Eval and Treat 2 x 4 weeks  Medical Diagnosis from Referral: M54.12 (ICD-10-CM) - Cervical radiculopathy S46.812A (ICD-10-CM) - Strain of left trapezius muscle, initial encounter   Evaluation Date: 1/7/2020  Authorization Period Expiration: 1/5/2021   Plan of Care Expiration: 2/7/2020  Visit # / Visits authorized: 4/ 1 (further auth pending)        Time In: 1:05 PM  Time Out: 2:00 PM  Total Billable Time: 45 minutes     Precautions: Standard     Subjective      Pt reports: that she has not had a headache since the previous visit and the dry needling completely resolved her L jaw pain. Continues to have clicking on the R jaw. Is scheduled to get a new  in 1 week.       She was compliant with home exercise program.  Response to previous treatment: "I had more soreness at first, but then felt more relief."  Functional change: modifying exercise routine     Pain: 1/10  Location:  left neck and shoulder blade       Objective      Face symmetry: normal  Teeth / Jaw Alignment: normal  Lip closure: normal  Tongue position: normal  Occlusion: normal    Palpation:   Temporalis: +   Masseter: +    pterygoid: +     Jaw AROM     Mandibular Opening Mandibular Closing LEFT Lateral Excursion RIGHT Lateral Excursion Protrusion   ROM <40 mm with self guarding, but able to open 40 mm with pain / crepitus normla mm >2 mm >2 mm 1 mm   Deviations To R To midline none none none   Pain + - - - -   Crepitus (crunching) + - - - -     Special tests  Bite on seperators (unilateral) Bite on R = no pain   Bite on L = Pain on opposite side   Bite on seperators (bilateral) No pain       Georgie received " "therapeutic exercises to develop strength, endurance, ROM, flexibility, posture and core stabilization for 20 minutes including:  UT stretch 3 x 30" - NP  1/2 foam pec stretch x 2 min  1/2 foam supine punch 20x with 2#  1/2 foam horiz abd/add 20x with 2#  1/2 foam alt flex/ext 20x with 2#  1/2 foam flasher 20x OTB  Prone scap squeeze 10" x 10  Prone Y/T/A 3" x 20    AROM lateral excursion: 10x  unilat bite down 10 x 5" x 2 popsicle sticks  Gentle manual OP into mandibular opening 10 x 5"     Georgie received the following manual therapy techniques: Soft tissue Mobilization were applied to the: neck and shoulders for 20 minutes, including:     Application of TDN: Pt educated on benefits and potential side effects of dry needling. Educated pt on benefits, precautions, side effects following TDN. Educated pt to use heat following treatment sessions if pt is experiencing pain or soreness. Pt verbalized good understanding of education.  Pt signed written consent to dry needling. Pt gave verbal consent for DN     Pt received dry needling to the below listed muscles using 15 and 40 mm needles.  In sidelying: L UT, LS, rhomboid, TMJ (masseter, pterygoids, temporalis). Winding performed every 5 minutes during treatment.              Home Exercises Provided and Patient Education Provided      Education provided:   - Therex rationale     Written Home Exercises Provided: Patient instructed to cont prior HEP.  Exercises were reviewed and Georgie was able to demonstrate them prior to the end of the session.  Georgie demonstrated good  understanding of the education provided.      See EMR under Patient Instructions for exercises provided 1/7/2020.     Assessment      Pt presents with R mandibular deviation with mouth opening with crepitus over R TMJ. Special testing combined with AROM indicate possible joint dysfunction or deep muscle dysfunction. Pt reports complete resolution of symptoms following dry needling, with normalization " of opening without deflection or deviations during AROM and resolution of crepitus on R TMJ.      Georgie is progressing well towards her goals.   Pt prognosis is Excellent.      Pt will continue to benefit from skilled outpatient physical therapy to address the deficits listed in the problem list box on initial evaluation, provide pt/family education and to maximize pt's level of independence in the home and community environment.      Pt's spiritual, cultural and educational needs considered and pt agreeable to plan of care and goals.     Anticipated barriers to physical therapy:  work schedule, activity level      Goals: IE 1/7/2020  Short Term Goals (4 Weeks):   1. Pt will report 20% reduction in pain of the cervical spine and LUE for ease with computer work. Progressing, not met  2. Pt will demonstrate 1/3 MMT improvement in periscapular strength for ease with upright posture. Progressing, not met  3. Pt will report modifications made to desk set up to improve ergonomics and posture with typing.  Progressing, not met     Long Term Goals (12 Weeks):   1. Pt will report being independent with HEP for maintenance of improvements gained during therapy sessions. Progressing, not met  2. PT will report 50% reduction of pain of the neck and LUE for ease with performing job tasks. Progressing, not met  3. Pt will demonstrate full BUE and periscapular strength without the provocation of pain for ease with return to full gym exercise routine at [facility]. Progressing, not met  4. Pt will demonstrate appropriate upright posture without external cueing for ease with computer work.  Progressing, not met        Plan   Plan of care Certification: 1/7/2020 to 2/7/2020      Continue plan of care with focus on improving postural strength and stability. Continue dry needling as needed for pain relief and improved soft tissue mobility.    Mariella Ricketts, PT

## 2020-01-21 ENCOUNTER — CLINICAL SUPPORT (OUTPATIENT)
Dept: REHABILITATION | Facility: OTHER | Age: 40
End: 2020-01-21
Payer: COMMERCIAL

## 2020-01-21 DIAGNOSIS — G89.29 CHRONIC TMJ PAIN: ICD-10-CM

## 2020-01-21 DIAGNOSIS — M54.2 NECK PAIN: ICD-10-CM

## 2020-01-21 DIAGNOSIS — M25.512 ACUTE PAIN OF LEFT SHOULDER: ICD-10-CM

## 2020-01-21 DIAGNOSIS — M26.629 CHRONIC TMJ PAIN: ICD-10-CM

## 2020-01-21 DIAGNOSIS — R29.3 POSTURE IMBALANCE: ICD-10-CM

## 2020-01-21 PROCEDURE — 97140 MANUAL THERAPY 1/> REGIONS: CPT | Mod: PN

## 2020-01-21 PROCEDURE — 97110 THERAPEUTIC EXERCISES: CPT | Mod: PN

## 2020-01-21 NOTE — PROGRESS NOTES
"Physical Therapy Daily Treatment Note      Name: Georgie Kamarala  Clinic Number: [id number]     Therapy Diagnosis:   Encounter Diagnoses   Name Primary?    Neck pain     Acute pain of left shoulder     Posture imbalance     Chronic TMJ pain         Physician: Syed Carbajal PA*     Visit Date: 1/15/2020     Physician Orders: PT Eval and Treat 2 x 4 weeks  Medical Diagnosis from Referral: M54.12 (ICD-10-CM) - Cervical radiculopathy S46.812A (ICD-10-CM) - Strain of left trapezius muscle, initial encounter   Evaluation Date: 1/7/2020  Authorization Period Expiration: 1/5/2021   Plan of Care Expiration: 2/7/2020  Visit # / Visits authorized: 4/ 1 (further auth pending)        Time In: 4:00 PM  Time Out: 4:50 PM  Total Billable Time: 50 minutes     Precautions: Standard     Subjective      Pt reports: acute flare up of L neck and shoulder pain with sharp pain down the L arm to the wrist. Says that she has tried stretching, yoga, and massage without relief. Right side bending with simultaneous extension increase sx.  She was compliant with home exercise program.  Response to previous treatment: "I had more soreness at first, but then felt more relief."  Functional change: modifying exercise routine     Pain: 8/10  Location:  left neck and shoulder blade       Objective      Georgie received therapeutic exercises to develop strength, endurance, ROM, flexibility, posture and core stabilization for 30 minutes including:  UT stretch 3 x 30"   1/2 foam pec stretch x 2 min  1/2 foam supine punch 20x with 2#  1/2 foam horiz abd/add 20x with 2#  1/2 foam alt flex/ext 20x with 2# - NP  1/2 foam flasher 20x OTB - NP  Prone scap squeeze 10" x 10  Prone Y/T/A 3" x 20    AROM lateral excursion: 10x - NP  unilat bite down 10 x 5" x 2 popsicle sticks - NP  Gentle manual OP into mandibular opening 10 x 5" - NP     Georgie received the following manual therapy techniques: Soft tissue Mobilization were applied to the: neck and " shoulders for 20 minutes, including:     Application of TDN: Pt educated on benefits and potential side effects of dry needling. Educated pt on benefits, precautions, side effects following TDN. Educated pt to use heat following treatment sessions if pt is experiencing pain or soreness. Pt verbalized good understanding of education.  Pt signed written consent to dry needling. Pt gave verbal consent for DN     Pt received dry needling to the below listed muscles using 15 and 40 mm needles.  In sidelying: L UT, LS, rhomboid, TMJ (masseter, pterygoids, temporalis). Winding performed every 5 minutes during treatment.              Home Exercises Provided and Patient Education Provided      Education provided:   - Therex rationale     Written Home Exercises Provided: Patient instructed to cont prior HEP.  Exercises were reviewed and Georgie was able to demonstrate them prior to the end of the session.  Georgie demonstrated good  understanding of the education provided.      See EMR under Patient Instructions for exercises provided 1/7/2020.     Assessment      Minimal improvement with dry needling or scapular strengthening. Pt presents with increased tone/tenderness to L levator scap > upper traps with continued poor scapular positioning despite VC/TC due to decreased strength.       Georgie is progressing well towards her goals.   Pt prognosis is Excellent.      Pt will continue to benefit from skilled outpatient physical therapy to address the deficits listed in the problem list box on initial evaluation, provide pt/family education and to maximize pt's level of independence in the home and community environment.      Pt's spiritual, cultural and educational needs considered and pt agreeable to plan of care and goals.     Anticipated barriers to physical therapy:  work schedule, activity level      Goals: IE 1/7/2020  Short Term Goals (4 Weeks):   1. Pt will report 20% reduction in pain of the cervical spine and LUE for  ease with computer work. Progressing, not met  2. Pt will demonstrate 1/3 MMT improvement in periscapular strength for ease with upright posture. Progressing, not met  3. Pt will report modifications made to desk set up to improve ergonomics and posture with typing.  Progressing, not met     Long Term Goals (12 Weeks):   1. Pt will report being independent with HEP for maintenance of improvements gained during therapy sessions. Progressing, not met  2. PT will report 50% reduction of pain of the neck and LUE for ease with performing job tasks. Progressing, not met  3. Pt will demonstrate full BUE and periscapular strength without the provocation of pain for ease with return to full gym exercise routine at [facility]. Progressing, not met  4. Pt will demonstrate appropriate upright posture without external cueing for ease with computer work.  Progressing, not met        Plan   Plan of care Certification: 1/7/2020 to 2/7/2020      Continue plan of care with focus on improving postural strength and stability. Continue dry needling as needed for pain relief and improved soft tissue mobility.    Mariella Ricketts, PT

## 2020-01-23 ENCOUNTER — CLINICAL SUPPORT (OUTPATIENT)
Dept: REHABILITATION | Facility: OTHER | Age: 40
End: 2020-01-23
Payer: COMMERCIAL

## 2020-01-23 DIAGNOSIS — G89.29 CHRONIC TMJ PAIN: ICD-10-CM

## 2020-01-23 DIAGNOSIS — R29.3 POSTURE IMBALANCE: ICD-10-CM

## 2020-01-23 DIAGNOSIS — M54.2 NECK PAIN: ICD-10-CM

## 2020-01-23 DIAGNOSIS — M26.629 CHRONIC TMJ PAIN: ICD-10-CM

## 2020-01-23 DIAGNOSIS — M25.512 ACUTE PAIN OF LEFT SHOULDER: ICD-10-CM

## 2020-01-23 PROCEDURE — 97110 THERAPEUTIC EXERCISES: CPT | Mod: PN

## 2020-01-23 PROCEDURE — 97012 MECHANICAL TRACTION THERAPY: CPT | Mod: PN

## 2020-01-23 NOTE — PROGRESS NOTES
"Physical Therapy Daily Treatment Note      Name: Georgie Wetzel  Clinic Number: [id number]     Therapy Diagnosis:   Encounter Diagnoses   Name Primary?    Neck pain     Acute pain of left shoulder     Posture imbalance     Chronic TMJ pain         Physician: Syed Carbajal PA*     Visit Date: 1/15/2020     Physician Orders: PT Eval and Treat 2 x 4 weeks  Medical Diagnosis from Referral: M54.12 (ICD-10-CM) - Cervical radiculopathy S46.812A (ICD-10-CM) - Strain of left trapezius muscle, initial encounter   Evaluation Date: 1/7/2020  Authorization Period Expiration: 1/5/2021   Plan of Care Expiration: 2/7/2020  Visit # / Visits authorized: 5/ 1 (further auth pending)        Time In: 2:00 PM  Time Out: 2:55 PM  Total Billable Time: 55 minutes     Precautions: Standard     Subjective      Pt reports: the tape helped reduce her pain. Had a 2nd opinion with an MD today re: continued neck pain with pain down the arm. Per the pt, MD believes that her symptoms are myofascial in nature but that the acute shoulder pain might be due to a small labral tear.     She was compliant with home exercise program.  Response to previous treatment: "I had more soreness at first, but then felt more relief."  Functional change: modifying exercise routine     Pain: 8/10  Location:  left neck and shoulder blade       Objective      Georgie received therapeutic exercises to develop strength, endurance, ROM, flexibility, posture and core stabilization for 40 minutes including:    UT stretch 3 x 20"   LS stretch 3 x 20"  1/2 foam pec stretch x 2 min - NP  1/2 foam supine punch 20x with 2# - NP  1/2 foam horiz abd/add 20x with 2# - NP  1/2 foam alt flex/ext 20x with 2# - NP  1/2 foam flasher 20x OTB - NP  Prone scap squeeze 10" x 10  Prone Y/T/A 3" x 20  +prone ironmans: 20 x 5"  +candi pose reach roll and lift 2 x 10 x 3" holds    AROM lateral excursion: 10x - NP  unilat bite down 10 x 5" x 2 popsicle sticks - NP  Gentle manual OP into " "mandibular opening 10 x 5" - NP     Georgie received the following manual therapy techniques: Soft tissue Mobilization were applied to the: neck and shoulders for 5minutes, including:     DEFER -- Application of TDN: Pt educated on benefits and potential side effects of dry needling. Educated pt on benefits, precautions, side effects following TDN. Educated pt to use heat following treatment sessions if pt is experiencing pain or soreness. Pt verbalized good understanding of education.  Pt signed written consent to dry needling. Pt gave verbal consent for DN     Pt received dry needling to the below listed muscles using 15 and 40 mm needles.  In sidelying: L UT, LS, rhomboid, TMJ (masseter, pterygoids, temporalis). Winding performed every 5 minutes during treatment.      5 min x Pham taping for UT inhibition    Modalities: Georgie received intermittent mechanical traction to the cervical spine at a force of 12 pounds for a total of 15 minutes. Hold time of 40 seconds and rest time for 20 seconds. Patient tolerated treatment well without any adverse effects.     Home Exercises Provided and Patient Education Provided      Education provided:   - Therex rationale     Written Home Exercises Provided: Patient instructed to cont prior HEP.  Exercises were reviewed and Georgie was able to demonstrate them prior to the end of the session.  Georgie demonstrated good  understanding of the education provided.      See EMR under Patient Instructions for exercises provided 1/7/2020.     Assessment      Resolution of lower arm following mechanical traction. New exercises added to promote lower trap strength. Good tolerance without increased symptoms.    Georgie is progressing well towards her goals.   Pt prognosis is Excellent.      Pt will continue to benefit from skilled outpatient physical therapy to address the deficits listed in the problem list box on initial evaluation, provide pt/family education and to maximize pt's " level of independence in the home and community environment.      Pt's spiritual, cultural and educational needs considered and pt agreeable to plan of care and goals.     Anticipated barriers to physical therapy:  work schedule, activity level      Goals: IE 1/7/2020  Short Term Goals (4 Weeks):   1. Pt will report 20% reduction in pain of the cervical spine and LUE for ease with computer work. Progressing, not met  2. Pt will demonstrate 1/3 MMT improvement in periscapular strength for ease with upright posture. Progressing, not met  3. Pt will report modifications made to desk set up to improve ergonomics and posture with typing.  Progressing, not met     Long Term Goals (12 Weeks):   1. Pt will report being independent with HEP for maintenance of improvements gained during therapy sessions. Progressing, not met  2. PT will report 50% reduction of pain of the neck and LUE for ease with performing job tasks. Progressing, not met  3. Pt will demonstrate full BUE and periscapular strength without the provocation of pain for ease with return to full gym exercise routine at [facility]. Progressing, not met  4. Pt will demonstrate appropriate upright posture without external cueing for ease with computer work.  Progressing, not met        Plan   Plan of care Certification: 1/7/2020 to 2/7/2020      Continue plan of care with focus on improving postural strength and stability. Continue dry needling as needed for pain relief and improved soft tissue mobility.    Mariella Ricketts, PT

## 2020-01-28 ENCOUNTER — CLINICAL SUPPORT (OUTPATIENT)
Dept: REHABILITATION | Facility: OTHER | Age: 40
End: 2020-01-28
Payer: COMMERCIAL

## 2020-01-28 DIAGNOSIS — M25.512 ACUTE PAIN OF LEFT SHOULDER: ICD-10-CM

## 2020-01-28 DIAGNOSIS — G89.29 CHRONIC TMJ PAIN: ICD-10-CM

## 2020-01-28 DIAGNOSIS — M54.2 NECK PAIN: ICD-10-CM

## 2020-01-28 DIAGNOSIS — R29.3 POSTURE IMBALANCE: ICD-10-CM

## 2020-01-28 DIAGNOSIS — M26.629 CHRONIC TMJ PAIN: ICD-10-CM

## 2020-01-28 PROCEDURE — 97110 THERAPEUTIC EXERCISES: CPT | Mod: PN | Performed by: PHYSICAL THERAPIST

## 2020-01-28 PROCEDURE — 97012 MECHANICAL TRACTION THERAPY: CPT | Mod: PN | Performed by: PHYSICAL THERAPIST

## 2020-01-28 NOTE — PROGRESS NOTES
"Physical Therapy Daily Treatment Note      Name: Georgie Kamarala  Clinic Number: [id number]     Therapy Diagnosis:   Encounter Diagnoses   Name Primary?    Neck pain     Acute pain of left shoulder     Posture imbalance     Chronic TMJ pain         Physician: Syed Carbajal PA*     Visit Date: 1/15/2020     Physician Orders: PT Eval and Treat 2 x 4 weeks  Medical Diagnosis from Referral: M54.12 (ICD-10-CM) - Cervical radiculopathy S46.812A (ICD-10-CM) - Strain of left trapezius muscle, initial encounter   Evaluation Date: 1/7/2020  Authorization Period Expiration: 12/31/2020  Plan of Care Expiration: 2/7/2020  Visit # / Visits authorized: 6/ 20        Time In: 5:00 PM  Time Out: 6:00 PM  Total Billable Time: 60 minutes     Precautions: Standard     Subjective      Pt reports: no significant change, still having pain into L UE. She says she does not have pain while she's working out, only at rest. She reports feeling relief with traction and tape at last visit.  She was compliant with home exercise program.  Response to previous treatment: relief with traction and tape.   Functional change: modifying exercise routine     Pain: 8/10  Location:  left neck and shoulder blade       Objective      Georgie received therapeutic exercises to develop strength, endurance, ROM, flexibility, posture and core stabilization for 40 minutes including:    +Still point inducer x 3 min  UT stretch 3 x 20"   LS stretch 3 x 20"  1/2 foam pec stretch x 2 min - NP  1/2 foam supine punch 20x with 2# - NP  1/2 foam horiz abd/add 20x with 2# - NP  1/2 foam alt flex/ext 20x with 2# - NP  1/2 foam flasher 20x OTB - NP  Prone scap squeeze 10" x 10 - NP  Prone Y/T/A 3" x 20 - nP  prone ironmans: 20 x 5"  candi pose reach roll and lift 2 x 10 x 3" holds  +Prone on ball snow angles x 10    AROM lateral excursion: 10x - NP  unilat bite down 10 x 5" x 2 popsicle sticks - NP  Gentle manual OP into mandibular opening 10 x 5" - NP  +Manual " subscap and lat stretch     Georgie received the following manual therapy techniques: Soft tissue Mobilization were applied to the: neck and shoulders for 5minutes, including:     DEFER -- Application of TDN: Pt educated on benefits and potential side effects of dry needling. Educated pt on benefits, precautions, side effects following TDN. Educated pt to use heat following treatment sessions if pt is experiencing pain or soreness. Pt verbalized good understanding of education.  Pt signed written consent to dry needling. Pt gave verbal consent for DN     Pt received dry needling to the below listed muscles using 15 and 40 mm needles.  In sidelying: L UT, LS, rhomboid, TMJ (masseter, pterygoids, temporalis). Winding performed every 5 minutes during treatment.      5 min x Pham taping for UT inhibition    Modalities: Georgie received intermittent mechanical traction to the cervical spine at a force of 12 pounds for a total of 15 minutes. Hold time of 40 seconds and rest time for 20 seconds. Patient tolerated treatment well without any adverse effects.     Home Exercises Provided and Patient Education Provided      Education provided:   - Therex rationale     Written Home Exercises Provided: Patient instructed to cont prior HEP.  Exercises were reviewed and Georgie was able to demonstrate them prior to the end of the session.  Georgie demonstrated good  understanding of the education provided.      See EMR under Patient Instructions for exercises provided 1/7/2020.     Assessment      Good tolerance to treatment today. Pt reports relief with traction, still point inducer, and Pham tape. Pt reports feeling good lower trap contraction with prone snow angels on ball.     Georgie is progressing well towards her goals.   Pt prognosis is Excellent.      Pt will continue to benefit from skilled outpatient physical therapy to address the deficits listed in the problem list box on initial evaluation, provide pt/family  education and to maximize pt's level of independence in the home and community environment.      Pt's spiritual, cultural and educational needs considered and pt agreeable to plan of care and goals.     Anticipated barriers to physical therapy:  work schedule, activity level      Goals: IE 1/7/2020  Short Term Goals (4 Weeks):   1. Pt will report 20% reduction in pain of the cervical spine and LUE for ease with computer work. Progressing, not met  2. Pt will demonstrate 1/3 MMT improvement in periscapular strength for ease with upright posture. Progressing, not met  3. Pt will report modifications made to desk set up to improve ergonomics and posture with typing.  Progressing, not met     Long Term Goals (12 Weeks):   1. Pt will report being independent with HEP for maintenance of improvements gained during therapy sessions. Progressing, not met  2. PT will report 50% reduction of pain of the neck and LUE for ease with performing job tasks. Progressing, not met  3. Pt will demonstrate full BUE and periscapular strength without the provocation of pain for ease with return to full gym exercise routine at [facility]. Progressing, not met  4. Pt will demonstrate appropriate upright posture without external cueing for ease with computer work.  Progressing, not met        Plan   Plan of care Certification: 1/7/2020 to 2/7/2020      Continue plan of care with focus on improving postural strength and stability. Continue dry needling as needed for pain relief and improved soft tissue mobility.    Ping Vaughn, PT

## 2020-02-03 ENCOUNTER — CLINICAL SUPPORT (OUTPATIENT)
Dept: REHABILITATION | Facility: OTHER | Age: 40
End: 2020-02-03
Payer: COMMERCIAL

## 2020-02-03 DIAGNOSIS — G89.29 CHRONIC TMJ PAIN: ICD-10-CM

## 2020-02-03 DIAGNOSIS — M25.512 ACUTE PAIN OF LEFT SHOULDER: ICD-10-CM

## 2020-02-03 DIAGNOSIS — R29.3 POSTURE IMBALANCE: ICD-10-CM

## 2020-02-03 DIAGNOSIS — M26.629 CHRONIC TMJ PAIN: ICD-10-CM

## 2020-02-03 DIAGNOSIS — M54.2 NECK PAIN: ICD-10-CM

## 2020-02-03 PROCEDURE — 97110 THERAPEUTIC EXERCISES: CPT | Mod: PN | Performed by: PHYSICAL THERAPIST

## 2020-02-03 NOTE — PROGRESS NOTES
"Physical Therapy Daily Treatment Note      Name: Georgie Kamarala  Clinic Number: [id number]     Therapy Diagnosis:   Encounter Diagnoses   Name Primary?    Neck pain     Acute pain of left shoulder     Posture imbalance     Chronic TMJ pain         Physician: Syed Carbajal PA*     Visit Date: 1/15/2020     Physician Orders: PT Eval and Treat 2 x 4 weeks  Medical Diagnosis from Referral: M54.12 (ICD-10-CM) - Cervical radiculopathy S46.812A (ICD-10-CM) - Strain of left trapezius muscle, initial encounter   Evaluation Date: 1/7/2020  Authorization Period Expiration: 12/31/2020  Plan of Care Expiration: 2/7/2020  Visit # / Visits authorized: 7/ 20        Time In: 8:00 AM  Time Out: 9:00 AM  Total Billable Time: 60 minutes     Precautions: Standard     Subjective      Pt reports:  "I didn't move for 4 days, and I didn't hurt for 4 days, but that's not sustainable. I can feel it, but it isn't painful." Denies pain into lower arm, min c/o soreness to lateral L shoulder and neck today  She was compliant with home exercise program.  Response to previous treatment: relief with traction and tape.   Functional change: modifying exercise routine     Pain: 2/10  Location:  left neck and shoulder blade       Objective      Georgie received therapeutic exercises to develop strength, endurance, ROM, flexibility, posture and core stabilization for 55 minutes including:    Still point inducer x 3 min  UT stretch 3 x 20"   LS stretch 3 x 20"  1/2 foam pec stretch x 2 min - NP  1/2 foam supine punch 20x with 2# - NP  1/2 foam horiz abd/add 20x with 2# - NP  1/2 foam alt flex/ext 20x with 2# - NP  1/2 foam flasher 20x OTB - NP  Prone scap squeeze 10" x 10 - NP  Prone Y/T/A 3" x 20 - nP  prone ironmans: 20 x 5" on blue pball  candi pose reach roll and lift 2 x 10 x 3" holds  +Prone on ball snow angels x 10  +Lat pull downs GTB 20x with tactile cuing  +D1 ext OTB 10x  +D2 ext YTB 10x    AROM lateral excursion: 10x - NP  unilat " "bite down 10 x 5" x 2 popsicle sticks - NP  Gentle manual OP into mandibular opening 10 x 5" - NP  +Manual subscap and lat stretch     Georgie received the following manual therapy techniques: Soft tissue Mobilization were applied to the: neck and shoulders for 5minutes, including:     DEFER -- Application of TDN: Pt educated on benefits and potential side effects of dry needling. Educated pt on benefits, precautions, side effects following TDN. Educated pt to use heat following treatment sessions if pt is experiencing pain or soreness. Pt verbalized good understanding of education.  Pt signed written consent to dry needling. Pt gave verbal consent for DN     Pt received dry needling to the below listed muscles using 15 and 40 mm needles.  In sidelying: L UT, LS, rhomboid, TMJ (masseter, pterygoids, temporalis). Winding performed every 5 minutes during treatment.      0 min x Pham taping for UT inhibition    5 min x preparation and application of kineseotape to L shoulder, I strip anchored at medial scap with tension around HH    Modalities: Georgie received intermittent mechanical traction to the cervical spine at a force of 12 pounds for a total of 15 minutes. Hold time of 40 seconds and rest time for 20 seconds. Patient tolerated treatment well without any adverse effects.     Home Exercises Provided and Patient Education Provided      Education provided:   - Therex rationale     Written Home Exercises Provided: Patient instructed to cont prior HEP.  Exercises were reviewed and Georgie was able to demonstrate them prior to the end of the session.  Georgie demonstrated good  understanding of the education provided.      See EMR under Patient Instructions for exercises provided 1/7/2020.     Assessment      Good tolerance to treatment today. Pt with some difficulty recruiting lats and lower trap, some improvement with verbal and tactile cuing. Tenderness to infraspinatus on L noted today. Tightness to medial " scapular border and lat/subscap L with manual stretch and mobilization today. Pt may benefit from resuming dry needling at next visit.      Georgie is progressing well towards her goals.   Pt prognosis is Excellent.      Pt will continue to benefit from skilled outpatient physical therapy to address the deficits listed in the problem list box on initial evaluation, provide pt/family education and to maximize pt's level of independence in the home and community environment.      Pt's spiritual, cultural and educational needs considered and pt agreeable to plan of care and goals.     Anticipated barriers to physical therapy:  work schedule, activity level      Goals: IE 1/7/2020  Short Term Goals (4 Weeks):   1. Pt will report 20% reduction in pain of the cervical spine and LUE for ease with computer work. Progressing, not met  2. Pt will demonstrate 1/3 MMT improvement in periscapular strength for ease with upright posture. Progressing, not met  3. Pt will report modifications made to desk set up to improve ergonomics and posture with typing.  Progressing, not met     Long Term Goals (12 Weeks):   1. Pt will report being independent with HEP for maintenance of improvements gained during therapy sessions. Progressing, not met  2. PT will report 50% reduction of pain of the neck and LUE for ease with performing job tasks. Progressing, not met  3. Pt will demonstrate full BUE and periscapular strength without the provocation of pain for ease with return to full gym exercise routine at [facility]. Progressing, not met  4. Pt will demonstrate appropriate upright posture without external cueing for ease with computer work.  Progressing, not met        Plan   Plan of care Certification: 1/7/2020 to 2/7/2020      Continue plan of care with focus on improving postural strength and stability. Continue dry needling as needed for pain relief and improved soft tissue mobility.    Ping Vaughn, PT

## 2020-02-05 ENCOUNTER — CLINICAL SUPPORT (OUTPATIENT)
Dept: REHABILITATION | Facility: OTHER | Age: 40
End: 2020-02-05
Payer: COMMERCIAL

## 2020-02-05 DIAGNOSIS — M54.2 NECK PAIN: ICD-10-CM

## 2020-02-05 DIAGNOSIS — G89.29 CHRONIC TMJ PAIN: ICD-10-CM

## 2020-02-05 DIAGNOSIS — R29.3 POSTURE IMBALANCE: ICD-10-CM

## 2020-02-05 DIAGNOSIS — M25.512 ACUTE PAIN OF LEFT SHOULDER: ICD-10-CM

## 2020-02-05 DIAGNOSIS — M26.629 CHRONIC TMJ PAIN: ICD-10-CM

## 2020-02-05 PROCEDURE — 97110 THERAPEUTIC EXERCISES: CPT | Mod: PN | Performed by: PHYSICAL THERAPIST

## 2020-02-05 NOTE — PLAN OF CARE
Outpatient Therapy Discharge Summary     Name: Georgie Wetzel  Clinic Number: [id number]    Therapy Diagnosis:   Encounter Diagnoses   Name Primary?    Neck pain     Acute pain of left shoulder     Posture imbalance     Chronic TMJ pain      Physician: Syed Carbajal PA*    Physician Orders: PT Eval and Treat 2 x 4 weeks  Medical Diagnosis from Referral: M54.12 (ICD-10-CM) - Cervical radiculopathy S46.812A (ICD-10-CM) - Strain of left trapezius muscle, initial encounter   Evaluation Date: 1/7/2020      Date of Last visit: 2/5/2020  Total Visits Received: 8  Cancelled Visits: 1  No Show Visits: 0    Assessment    Goals:   Short Term Goals (4 Weeks):   1. Pt will report 20% reduction in pain of the cervical spine and LUE for ease with computer work. Met 2/5/2020  2. Pt will demonstrate 1/3 MMT improvement in periscapular strength for ease with upright posture. Met 2/5/2020  3. Pt will report modifications made to desk set up to improve ergonomics and posture with typing.  Met 2/5/2020      Long Term Goals (12 Weeks):   1. Pt will report being independent with HEP for maintenance of improvements gained during therapy sessions. Met 2/5/2020  2. PT will report 50% reduction of pain of the neck and LUE for ease with performing job tasks.Met 2/5/2020  3. Pt will demonstrate full BUE and periscapular strength without the provocation of pain for ease with return to full gym exercise routine at [facility]. Progressing, not met  4. Pt will demonstrate appropriate upright posture without external cueing for ease with computer work.  Met 2/5/2020      Discharge reason: Patient requested discharge    Plan   This patient is discharged from Physical Therapy

## 2020-02-05 NOTE — PROGRESS NOTES
"Physical Therapy Daily Treatment Note      Name: Georgie Wetzel  Clinic Number: [id number]     Therapy Diagnosis:   Encounter Diagnoses   Name Primary?    Neck pain     Acute pain of left shoulder     Posture imbalance     Chronic TMJ pain         Physician: Syed Carbajal PA*     Visit Date: 1/15/2020     Physician Orders: PT Eval and Treat 2 x 4 weeks  Medical Diagnosis from Referral: M54.12 (ICD-10-CM) - Cervical radiculopathy S46.812A (ICD-10-CM) - Strain of left trapezius muscle, initial encounter   Evaluation Date: 1/7/2020  Authorization Period Expiration: 12/31/2020  Plan of Care Expiration: 2/7/2020  Visit # / Visits authorized: 8/ 20        Time In: 8:00 AM  Time Out: 8:40 AM  Total Billable Time: 40 minutes     Precautions: Standard     Subjective      Pt reports: overall feeling much better. She says she has started to modify her workouts at [employer] to decrease resistance and pacing so that she can focus on her form. With lat pull downs she says she was able to consciously relax her shoulders and engage her back, and felt she had soreness in the right place. She says she feels ready to finish with therapy today and continue with her fitness program and .   She was compliant with home exercise program.  Response to previous treatment: relief with traction and tape.   Functional change: modifying exercise routine     Pain: 1/10  Location:  left neck and shoulder blade       Objective          CMS Impairment/Limitation/Restriction for FOTO Neck Survey    Therapist reviewed FOTO scores for Georgie Wetzel on 2/5/2020.   FOTO documents entered into [employer] - see Media section.    Limitation Score: 25% at evaluation    Current : 21%  Goal: 19%  Discharge:21%             Georgie received therapeutic exercises to develop strength, endurance, ROM, flexibility, posture and core stabilization for 30 minutes including:  Reassessment and discharge    Still point inducer x 3 min  UT stretch 3 x 20"   LS " "stretch 3 x 20"  1/2 foam pec stretch x 2 min - NP  1/2 foam supine punch 20x with 2# - NP  1/2 foam horiz abd/add 20x with 2# - NP  1/2 foam alt flex/ext 20x with 2# - NP  1/2 foam flasher 20x OTB - NP  Prone scap squeeze 10" x 10 - NP  Prone Y/T/A 3" x 20 - nP  prone ironmans: 20 x 5" on blue pball  candi pose reach roll and lift 2 x 10 x 3" holds  +Prone on ball snow angels x 10  +Lat pull downs GTB 20x with tactile cuing  +D1 ext OTB 10x  +D2 ext YTB 10x    AROM lateral excursion: 10x - NP  unilat bite down 10 x 5" x 2 popsicle sticks - NP  Gentle manual OP into mandibular opening 10 x 5" - NP  +Manual subscap and lat stretch     Georgie received the following manual therapy techniques: Soft tissue Mobilization were applied to the: neck and shoulders for 10 minutes, including:  10 min x MET and HVLAT to upper TSP to correct L rib prominence     DEFER -- Application of TDN: Pt educated on benefits and potential side effects of dry needling. Educated pt on benefits, precautions, side effects following TDN. Educated pt to use heat following treatment sessions if pt is experiencing pain or soreness. Pt verbalized good understanding of education.  Pt signed written consent to dry needling. Pt gave verbal consent for DN     Pt received dry needling to the below listed muscles using 15 and 40 mm needles.  In sidelying: L UT, LS, rhomboid, TMJ (masseter, pterygoids, temporalis). Winding performed every 5 minutes during treatment.      0 min x Pham taping for UT inhibition    0 min x preparation and application of kineseotape to L shoulder, I strip anchored at medial scap with tension around HH    Modalities: Georgie received intermittent mechanical traction to the cervical spine at a force of 12 pounds for a total of 15 minutes. Hold time of 40 seconds and rest time for 20 seconds. Patient tolerated treatment well without any adverse effects.     Home Exercises Provided and Patient Education Provided      Education " provided:   - Therex rationale     Written Home Exercises Provided: Patient instructed to cont prior HEP.  Exercises were reviewed and Georgie was able to demonstrate them prior to the end of the session.  Georgie demonstrated good  understanding of the education provided.      See EMR under Patient Instructions for exercises provided 1/7/2020.     Assessment      Georgie has made excellent progress with therapy. She demonstrates improved posture, and reports improved ergonomics with desk at work. She verbalizes good understanding of recommended modifications to correct form with existing exercise routine, as well as independence with HEP. Based on progress, presentatation, and pt request, recommend discharge to independent home and fitness program at this time.        Pt's spiritual, cultural and educational needs considered and pt agreeable to plan of care and goals.     Anticipated barriers to physical therapy:  work schedule, activity level      Goals: IE 1/7/2020  Short Term Goals (4 Weeks):   1. Pt will report 20% reduction in pain of the cervical spine and LUE for ease with computer work. Met 2/5/2020  2. Pt will demonstrate 1/3 MMT improvement in periscapular strength for ease with upright posture. Met 2/5/2020  3. Pt will report modifications made to desk set up to improve ergonomics and posture with typing.  Met 2/5/2020      Long Term Goals (12 Weeks):   1. Pt will report being independent with HEP for maintenance of improvements gained during therapy sessions. Met 2/5/2020  2. PT will report 50% reduction of pain of the neck and LUE for ease with performing job tasks.Met 2/5/2020  3. Pt will demonstrate full BUE and periscapular strength without the provocation of pain for ease with return to full gym exercise routine at [facility]. Progressing, not met  4. Pt will demonstrate appropriate upright posture without external cueing for ease with computer work.  Met 2/5/2020        Plan   Discharge to independent  fitness program    Ping Vaughn, PT

## 2020-02-07 ENCOUNTER — TELEPHONE (OUTPATIENT)
Dept: SPORTS MEDICINE | Facility: CLINIC | Age: 40
End: 2020-02-07

## 2020-02-07 ENCOUNTER — PATIENT MESSAGE (OUTPATIENT)
Dept: SPORTS MEDICINE | Facility: CLINIC | Age: 40
End: 2020-02-07

## 2020-02-07 NOTE — TELEPHONE ENCOUNTER
Spoke to patient about rescheduling appointment. Patient will reschedule appointment through [facility].

## 2020-02-24 ENCOUNTER — TELEPHONE (OUTPATIENT)
Dept: OPHTHALMOLOGY | Facility: CLINIC | Age: 40
End: 2020-02-24

## 2020-02-24 NOTE — TELEPHONE ENCOUNTER
----- Message from Erin Clements sent at 2/24/2020 10:51 AM CST -----  Pt called to reschedule appt from 02/26/20 to the first week in March 4, 5,or 6. Pt asked for a call back.      Pt contact # [phone].      Thanks

## 2020-03-06 ENCOUNTER — OFFICE VISIT (OUTPATIENT)
Dept: OPHTHALMOLOGY | Facility: CLINIC | Age: 40
End: 2020-03-06
Payer: COMMERCIAL

## 2020-03-06 DIAGNOSIS — H31.003 CHORIORETINAL SCAR OF BOTH EYES: Primary | ICD-10-CM

## 2020-03-06 PROCEDURE — 99999 PR PBB SHADOW E&M-EST. PATIENT-LVL II: ICD-10-PCS | Mod: PBBFAC,,, | Performed by: OPHTHALMOLOGY

## 2020-03-06 PROCEDURE — 92014 PR EYE EXAM, EST PATIENT,COMPREHESV: ICD-10-PCS | Mod: S$GLB,,, | Performed by: OPHTHALMOLOGY

## 2020-03-06 PROCEDURE — 92014 COMPRE OPH EXAM EST PT 1/>: CPT | Mod: S$GLB,,, | Performed by: OPHTHALMOLOGY

## 2020-03-06 PROCEDURE — 92201 OPSCPY EXTND RTA DRAW UNI/BI: CPT | Mod: S$GLB,,, | Performed by: OPHTHALMOLOGY

## 2020-03-06 PROCEDURE — 99999 PR PBB SHADOW E&M-EST. PATIENT-LVL II: CPT | Mod: PBBFAC,,, | Performed by: OPHTHALMOLOGY

## 2020-03-06 PROCEDURE — 92201 PR OPHTHALMOSCOPY, EXT, W/RET DRAW/SCLERAL DEPR, I&R, UNI/BI: ICD-10-PCS | Mod: S$GLB,,, | Performed by: OPHTHALMOLOGY

## 2020-03-07 NOTE — PROGRESS NOTES
HPI     Pt is here today for an annual retina exam  DLS: 02/18/19  Pt denies any issues since her last visit.  Eye Med(s) - none     Lorenzo Myopia S/P PRK OU  Dr. Munoz 2008   Lattice   S/p Focal OS 2005 for HST     Va good OU.  No f/f/pain OU    Last edited by Wilton Quesada MD on 3/6/2020  7:46 PM. (History)            Assessment /Plan     For exam results, see Encounter Report.    Chorioretinal scar of both eyes    Retina exam stable  H/o laser Rx.  H/o refractive surgey    RD precautions discussed in detail, patient expressed understanding  RTC 1 yr, sooner PRN (especially ANY change flashes, floaters, vision, visual field)

## 2020-03-10 ENCOUNTER — OFFICE VISIT (OUTPATIENT)
Dept: OBSTETRICS AND GYNECOLOGY | Facility: CLINIC | Age: 40
End: 2020-03-10
Attending: OBSTETRICS & GYNECOLOGY
Payer: COMMERCIAL

## 2020-03-10 VITALS
BODY MASS INDEX: 20.84 KG/M2 | DIASTOLIC BLOOD PRESSURE: 64 MMHG | SYSTOLIC BLOOD PRESSURE: 112 MMHG | WEIGHT: 125.25 LBS

## 2020-03-10 DIAGNOSIS — Z30.41 ENCOUNTER FOR SURVEILLANCE OF CONTRACEPTIVE PILLS: ICD-10-CM

## 2020-03-10 DIAGNOSIS — Z12.4 PAP SMEAR FOR CERVICAL CANCER SCREENING: ICD-10-CM

## 2020-03-10 DIAGNOSIS — Z01.419 WELL WOMAN EXAM WITH ROUTINE GYNECOLOGICAL EXAM: Primary | ICD-10-CM

## 2020-03-10 DIAGNOSIS — Z12.31 VISIT FOR SCREENING MAMMOGRAM: ICD-10-CM

## 2020-03-10 PROCEDURE — 99999 PR PBB SHADOW E&M-EST. PATIENT-LVL III: CPT | Mod: PBBFAC,,, | Performed by: OBSTETRICS & GYNECOLOGY

## 2020-03-10 PROCEDURE — 99999 PR PBB SHADOW E&M-EST. PATIENT-LVL III: ICD-10-PCS | Mod: PBBFAC,,, | Performed by: OBSTETRICS & GYNECOLOGY

## 2020-03-10 PROCEDURE — 99395 PREV VISIT EST AGE 18-39: CPT | Mod: S$GLB,,, | Performed by: OBSTETRICS & GYNECOLOGY

## 2020-03-10 PROCEDURE — 99395 PR PREVENTIVE VISIT,EST,18-39: ICD-10-PCS | Mod: S$GLB,,, | Performed by: OBSTETRICS & GYNECOLOGY

## 2020-03-10 PROCEDURE — 87624 HPV HI-RISK TYP POOLED RSLT: CPT

## 2020-03-10 PROCEDURE — 88175 CYTOPATH C/V AUTO FLUID REDO: CPT

## 2020-03-10 RX ORDER — NORETHINDRONE ACETATE AND ETHINYL ESTRADIOL .02; 1 MG/1; MG/1
1 TABLET ORAL DAILY
Qty: 63 TABLET | Refills: 4 | Status: SHIPPED | OUTPATIENT
Start: 2020-03-10 | End: 2021-04-04

## 2020-03-10 NOTE — PROGRESS NOTES
Georgie Wetzel is a 39 y.o. female  who presents for annual exam.  She continues on Microgestin OCPs with essentially no withdrawal bleeding.  Occasionally, she has minimal brownish spotting.  However, she experiences premenstrual symptoms monthly.  Hormone levels drawn 2 years ago showed FSH to be rising.  Reports occasional hot flashes.   Patient's last menstrual period was 2020.     3/19/18 FSH 24.7,  LH 9.3    Pap 19: Negative without endocervical cells, HPV negative    Past Medical History:   Diagnosis Date    Acne     Depression     treated with medication last use , situational     GERD (gastroesophageal reflux disease)     High cholesterol     Retinal detachment        Past Surgical History:   Procedure Laterality Date     Retinal Tear OS        COLONOSCOPY N/A 2017    Procedure: COLONOSCOPY;  Surgeon: Vahid Lynn MD;  [facility] ([address] [place] [address]);  Service: Endoscopy;  Laterality: N/A;  after Aug 13 for hx of diverticulitis    CRYOTHERAPY      RETINAL DETACHMENT SURGERY      RPK  OU Dr Munoz    2008    TONSILLECTOMY, ADENOIDECTOMY         OB History        0    Para   0    Term   0       0    AB   0    Living   0       SAB   0    TAB   0    Ectopic   0    Multiple   0    Live Births                     ROS:  GENERAL: Feeling well overall.   SKIN: Denies rash or lesions.   HEAD: Denies head injury or headache.   NODES: Denies enlarged lymph nodes.   CHEST: Denies chest pain or shortness of breath.   CARDIOVASCULAR: Denies palpitations or left sided chest pain.   ABDOMEN: No abdominal pain, nausea, vomiting or rectal bleeding.   URINARY: No dysuria or hematuria.  REPRODUCTIVE: See HPI.   BREASTS: Denies pain, lumps, or nipple discharge.   HEMATOLOGIC: No easy bruisability or excessive bleeding.   MUSCULOSKELETAL: Denies joint pain or swelling.   NEUROLOGIC: Denies syncope or weakness.   PSYCHIATRIC: Denies depression.    PE:   (chaperone present  during entire exam)  APPEARANCE: Well nourished, well developed, in no acute distress.  BREASTS: Symmetrical, no skin changes or visible lesions. No palpable masses, nipple discharge or adenopathy bilaterally.  ABDOMEN: Soft. No tenderness or masses. No hernias. No CVA tenderness.  VULVA: No lesions. Normal female genitalia.  URETHRAL MEATUS: Normal size and location, no lesions, no prolapse.  URETHRA: No masses, tenderness, prolapse or scarring.  VAGINA: Moist and well rugated, no abnormal discharge, no significant cystocele or rectocele.  CERVIX: No lesions and discharge. Stenotic. PAP done.  UTERUS: Normal size, regular shape, mobile, non-tender, bladder base nontender.  ADNEXA: No masses, tenderness or CDS nodularity.  ANUS PERINEUM: Normal.      Diagnosis:  1. Well woman exam with routine gynecological exam    2. Encounter for surveillance of contraceptive pills    3. Pap smear for cervical cancer screening    4. Visit for screening mammogram          PLAN:    Orders Placed This Encounter    HPV High Risk Genotypes, PCR    Mammo Digital Screening Bilat w/ [facility]    Liquid-Based Pap Smear, Screening    norethindrone-ethinyl estradiol (MICROGESTIN 1/20) 1-20 mg-mcg per tablet   FSH, LH at end of hormone free interval    Patient was counseled today on the need for annual gyn exams.  We discussed her perimenopausal symptoms and usage of OCPs: r / b / correct usage.  She is doing well on Microgestin and desires to continue.  We will recheck her hormone levels at the end of her hormone free week.  We also discussed beginning screening mammograms at age 40.     Follow-up in 1 year.

## 2020-03-11 ENCOUNTER — TELEPHONE (OUTPATIENT)
Dept: OBSTETRICS AND GYNECOLOGY | Facility: CLINIC | Age: 40
End: 2020-03-11

## 2020-03-11 ENCOUNTER — PATIENT MESSAGE (OUTPATIENT)
Dept: OBSTETRICS AND GYNECOLOGY | Facility: CLINIC | Age: 40
End: 2020-03-11

## 2020-03-11 NOTE — TELEPHONE ENCOUNTER
I will share your message with Dr Evans. I did call the [facility] pharmacy for you and they do not have the flu injections in at this time but did tell me the pharmacy at main campus on [facility] does and an order from a MD is not required. [place] age requirement either. Hope this helps. Arlen  ===View-only below this line===      ----- Message -----     From: Georgie Wetzel     Sent: 3/11/2020  8:12 AM CDT       To: Thom Evans MD  Subject: Visit Follow-Up    Dr. Evans -    First of all, allow me to apologize for having to leave your office before we had an opportunity to discuss my appointment and examination.  I can be available today to speak at your convenience.  Secondly, I wanted to ask if I could get an order for a flu shot - I attempted to get one from [facility], but was advised that they were not administering such shots to anyone under the age of 65.    Thank you and, again, sincere apologies -  Georgie Wetzel  [phone]

## 2020-03-12 NOTE — TELEPHONE ENCOUNTER
Called patient:    Reviewed office visit findings and plan.    To have FSH / LH drawn at end of her hormone free interval.

## 2020-03-17 LAB
HPV HR 12 DNA SPEC QL NAA+PROBE: NEGATIVE
HPV16 AG SPEC QL: NEGATIVE
HPV18 DNA SPEC QL NAA+PROBE: NEGATIVE

## 2020-04-02 ENCOUNTER — PATIENT MESSAGE (OUTPATIENT)
Dept: OBSTETRICS AND GYNECOLOGY | Facility: CLINIC | Age: 40
End: 2020-04-02

## 2020-04-02 LAB
FINAL PATHOLOGIC DIAGNOSIS: NORMAL
Lab: NORMAL

## 2020-06-11 ENCOUNTER — OFFICE VISIT (OUTPATIENT)
Dept: SPORTS MEDICINE | Facility: CLINIC | Age: 40
End: 2020-06-11
Payer: COMMERCIAL

## 2020-06-11 ENCOUNTER — HOSPITAL ENCOUNTER (OUTPATIENT)
Dept: RADIOLOGY | Facility: HOSPITAL | Age: 40
Discharge: HOME | End: 2020-06-11
Attending: ORTHOPAEDIC SURGERY
Payer: COMMERCIAL

## 2020-06-11 VITALS
HEART RATE: 69 BPM | SYSTOLIC BLOOD PRESSURE: 125 MMHG | BODY MASS INDEX: 20.83 KG/M2 | HEIGHT: 65 IN | DIASTOLIC BLOOD PRESSURE: 88 MMHG | WEIGHT: 125 LBS

## 2020-06-11 DIAGNOSIS — M25.511 CHRONIC RIGHT SHOULDER PAIN: ICD-10-CM

## 2020-06-11 DIAGNOSIS — G56.21 CUBITAL TUNNEL SYNDROME ON RIGHT: ICD-10-CM

## 2020-06-11 DIAGNOSIS — G89.29 CHRONIC RIGHT SHOULDER PAIN: ICD-10-CM

## 2020-06-11 DIAGNOSIS — M25.511 TRIGGER POINT OF RIGHT SHOULDER REGION: Primary | ICD-10-CM

## 2020-06-11 PROCEDURE — 99999 PR PBB SHADOW E&M-EST. PATIENT-LVL IV: CPT | Mod: PBBFAC,,, | Performed by: ORTHOPAEDIC SURGERY

## 2020-06-11 PROCEDURE — 3008F PR BODY MASS INDEX (BMI) DOCUMENTED: ICD-10-PCS | Mod: CPTII,S$GLB,, | Performed by: ORTHOPAEDIC SURGERY

## 2020-06-11 PROCEDURE — 73030 XR SHOULDER COMPLETE 2 OR MORE VIEWS RIGHT: ICD-10-PCS | Mod: 26,RT,, | Performed by: RADIOLOGY

## 2020-06-11 PROCEDURE — 99214 PR OFFICE/OUTPT VISIT, EST, LEVL IV, 30-39 MIN: ICD-10-PCS | Mod: S$GLB,,, | Performed by: ORTHOPAEDIC SURGERY

## 2020-06-11 PROCEDURE — 99214 OFFICE O/P EST MOD 30 MIN: CPT | Mod: S$GLB,,, | Performed by: ORTHOPAEDIC SURGERY

## 2020-06-11 PROCEDURE — 3008F BODY MASS INDEX DOCD: CPT | Mod: CPTII,S$GLB,, | Performed by: ORTHOPAEDIC SURGERY

## 2020-06-11 PROCEDURE — 73030 X-RAY EXAM OF SHOULDER: CPT | Mod: TC,RT

## 2020-06-11 PROCEDURE — 73030 X-RAY EXAM OF SHOULDER: CPT | Mod: 26,RT,, | Performed by: RADIOLOGY

## 2020-06-11 PROCEDURE — 99999 PR PBB SHADOW E&M-EST. PATIENT-LVL IV: ICD-10-PCS | Mod: PBBFAC,,, | Performed by: ORTHOPAEDIC SURGERY

## 2020-06-11 RX ORDER — METHYLPREDNISOLONE 4 MG/1
TABLET ORAL
Qty: 1 PACKAGE | Refills: 0 | Status: SHIPPED | OUTPATIENT
Start: 2020-06-11 | End: 2021-01-21

## 2020-06-11 RX ORDER — NAPROXEN 500 MG/1
500 TABLET ORAL 2 TIMES DAILY WITH MEALS
Qty: 60 TABLET | Refills: 2 | Status: SHIPPED | OUTPATIENT
Start: 2020-06-11

## 2020-06-11 NOTE — PROGRESS NOTES
CC: Right shoulder pain     39 y.o. Female presents as a new patient to me. She works as an . Complaint is right shoulder pain x 2-3 weeks. No history of specific antecedent trauma but has had two workouts that seemed to cause some pain afterwards. Pain localizes posterior arm currently.  She did have posterior shoulder pain. Burning.  Was going down to ulnar fingers but is improving since she has been resting.  Worse with random actions during day. Pain is disruptive of sleep at night. Better with rest. Treatment thus far has included activity modifications, rest, and oral medication.  Has not had PT.  Here today to discuss diagnosis and treatment options.     Has h/o L posterior shoulder pain that improved with activity modification.  She says it is not similar to what she is currently experiencing.    Denies neck pain.  Did report some burning to ulnar fingers that has resolved.     PMHx notable for none.   Negative for tobacco.   Negative for diabetes.     Pain Score:   3    PAST MEDICAL HISTORY:   Past Medical History:   Diagnosis Date    Acne     Depression     treated with medication last use 2010, situational     GERD (gastroesophageal reflux disease)     High cholesterol     Retinal detachment      PAST SURGICAL HISTORY:  Past Surgical History:   Procedure Laterality Date     Retinal Tear OS  2005      COLONOSCOPY N/A 8/25/2017    Procedure: COLONOSCOPY;  Surgeon: Vahid Lynn MD;  Location: [facility] ([address] [place] [address]);  Service: Endoscopy;  Laterality: N/A;  after Aug 13 for hx of diverticulitis    CRYOTHERAPY      RETINAL DETACHMENT SURGERY      RPK  OU Dr Munoz    7/2008    TONSILLECTOMY, ADENOIDECTOMY       FAMILY HISTORY:  Family History   Problem Relation Age of Onset    Acne Brother     Ovarian cancer Mother     Diabetes Maternal Grandmother     Glaucoma Maternal Grandfather     Cataracts Maternal Grandfather     Stroke Paternal Grandmother     Scleroderma Paternal Grandmother      Atrial fibrillation Father     Hyperlipidemia Father     Melanoma Neg Hx     Breast cancer Neg Hx      MEDICATIONS:    Current Outpatient Medications:     bimatoprost (LATISSE) 0.03 % ophthalmic solution, USE AS DIRECTED, Disp: , Rfl: 1    co-enzyme Q-10 50 mg capsule, Take 50 mg by mouth once daily., Disp: , Rfl:     LACTOBACILLUS ACIDOPHILUS (ACIDOPHILUS ORAL), Take by mouth., Disp: , Rfl:     MAGNESIUM ORAL, Take 500 mg by mouth once. , Disp: , Rfl:     norethindrone-ethinyl estradiol (MICROGESTIN 1/20) 1-20 mg-mcg per tablet, Take 1 tablet by mouth once daily., Disp: 63 tablet, Rfl: 4    omeprazole (PRILOSEC OTC) 20 MG tablet, Take 40 mg by mouth once daily. , Disp: , Rfl:     ondansetron (ZOFRAN-ODT) 4 MG TbDL, DISSOLVE 1 TABLET IN MOUTH EVERY 6 HOURS AS NEEDED FOR NAUSEA., Disp: , Rfl: 3    riboflavin, vitamin B2, 400 mg Tab, Take 1 capsule by mouth once daily., Disp: , Rfl:     rizatriptan (MAXALT-MLT) 5 MG disintegrating tablet, Dissolve 1 tab on tongue at onset of migraine, may repeat in 2 hours if needed. Max 3 tabs/day. Max 3 days/week., Disp: 12 tablet, Rfl: 5    spironolactone (ALDACTONE) 100 MG tablet, Take 100 mg by mouth once daily. , Disp: , Rfl:     tiZANidine (ZANAFLEX) 2 MG tablet, TAKE 1 TO 2 TABLETS BY MOUTH AT BEDTIME. NO DRIVING WHILE ON THIS MED. Will need appt for further refills., Disp: 60 tablet, Rfl: 4    ZIANA gel, APPLY A THIN FILM TO FACE AT BEDTIME, Disp: , Rfl: 6    methylPREDNISolone (MEDROL DOSEPACK) 4 mg tablet, use as directed, Disp: 1 Package, Rfl: 0    naproxen (NAPROSYN) 500 MG tablet, Take 1 tablet (500 mg total) by mouth 2 (two) times daily with meals., Disp: 60 tablet, Rfl: 2    ALLERGIES:  Review of patient's allergies indicates:   Allergen Reactions    No known drug allergies      REVIEW OF SYSTEMS:  Constitution: Negative. Negative for chills, fever and night sweats.    Hematologic/Lymphatic: Negative for bleeding problem. Does not bruise/bleed easily.  "  Skin: Negative for dry skin, itching and rash.   Musculoskeletal: Negative for falls. Positive for right shoulder pain and muscle weakness.     All other review of symptoms were reviewed and found to be noncontributory.     PHYSICAL EXAMINATION:  Vitals:  /88   Pulse 69   Ht 5' 5" (1.651 m)   Wt 56.7 kg (125 lb)   BMI 20.80 kg/m²    General: Well-developed well-nourished 39 y.o. femalein no acute distress   Cardiovascular: Regular rhythm by palpation of distal pulse, normal color and temperature, no concerning varicosities on symptomatic side   Lungs: No labored breathing or wheezing appreciated   Neuro: Alert and oriented ×3   Psychiatric: well oriented to person, place and time, demonstrates normal mood and affect   Skin: No rashes, lesions or ulcers, normal temperature, turgor, and texture on uninvolved extremity    Ortho/SPM Exam  Examination of the right shoulder demonstrates active forward elevation to 170, ER with arm at side to 70 IR to T4. Passive FE to 175, ER to 80. No tenderness along the proximal biceps tendon. Negative AC tenderness. 5/5 resisted supraspinatus testing. 5/5 resisted infraspinatus testing. Negative belly press test. Stable shoulder.  Tender along the medial periscapular musculature and trapezius.  Consistent with trigger point.    No midline neck tenderness. Negative Spurling's maneuver.     Positive Arana bilaterally. Positive Tinel at elbow.  Positive elbow flexion test.  No hypothenar atrophy.  Intact intrinsic function.    IMAGING:  Xrays including AP, Outlet and Axillary Lateral of RIGHT shoulder are ordered / images reviewed by me:   Negative      ASSESSMENT:      ICD-10-CM ICD-9-CM   1. Trigger point of right shoulder region  M25.511 719.41   2. Chronic right shoulder pain  M25.511 719.41    G89.29 338.29   3. Cubital tunnel syndrome on right  G56.21 354.2       PLAN:     -Findings and treatment options were discussed with the patient.  Primary complaint remains " right shoulder pain which is more muscular base.  Consistent with a symptomatic trigger point.  Conservative care was recommended.  Would benefit from dry needling and a periscapular strengthening program..  Physical therapy referral placed.  She does have some coexistent cubital tunnel syndrome.  The diagnosis and treatment options for this were reviewed.  Also recommend conservative treatment for now.  Hold off on neurodiagnostics.  -Medrol dosepack then afterwards may start Naproxen 500 BID  -Discussed parafon forte once she runs out of tizanidine if needed  -Call 4-6 weeks if still having issues  -All questions answered      Procedures

## 2020-07-01 ENCOUNTER — OFFICE VISIT (OUTPATIENT)
Dept: URGENT CARE | Facility: CLINIC | Age: 40
End: 2020-07-01
Payer: COMMERCIAL

## 2020-07-01 VITALS
OXYGEN SATURATION: 100 % | HEIGHT: 65 IN | TEMPERATURE: 97 F | BODY MASS INDEX: 20.99 KG/M2 | WEIGHT: 126 LBS | HEART RATE: 72 BPM

## 2020-07-01 DIAGNOSIS — R51.9 HEAD ACHE: ICD-10-CM

## 2020-07-01 DIAGNOSIS — R06.02 SHORTNESS OF BREATH: ICD-10-CM

## 2020-07-01 DIAGNOSIS — R43.9 PROBLEMS WITH SMELL AND TASTE: ICD-10-CM

## 2020-07-01 DIAGNOSIS — R05.9 COUGH: ICD-10-CM

## 2020-07-01 DIAGNOSIS — J30.9 ALLERGIC RHINITIS, UNSPECIFIED SEASONALITY, UNSPECIFIED TRIGGER: Primary | ICD-10-CM

## 2020-07-01 PROCEDURE — U0003 INFECTIOUS AGENT DETECTION BY NUCLEIC ACID (DNA OR RNA); SEVERE ACUTE RESPIRATORY SYNDROME CORONAVIRUS 2 (SARS-COV-2) (CORONAVIRUS DISEASE [COVID-19]), AMPLIFIED PROBE TECHNIQUE, MAKING USE OF HIGH THROUGHPUT TECHNOLOGIES AS DESCRIBED BY CMS-2020-01-R: HCPCS

## 2020-07-01 PROCEDURE — 99214 OFFICE O/P EST MOD 30 MIN: CPT | Mod: S$GLB,,, | Performed by: FAMILY MEDICINE

## 2020-07-01 PROCEDURE — 99214 PR OFFICE/OUTPT VISIT, EST, LEVL IV, 30-39 MIN: ICD-10-PCS | Mod: S$GLB,,, | Performed by: FAMILY MEDICINE

## 2020-07-01 RX ORDER — FLUTICASONE PROPIONATE 50 MCG
2 SPRAY, SUSPENSION (ML) NASAL DAILY
Qty: 16 G | Refills: 8 | Status: SHIPPED | OUTPATIENT
Start: 2020-07-01 | End: 2021-03-11

## 2020-07-01 NOTE — PROGRESS NOTES
"Subjective:       Patient ID: Georgie Wetzel is a 39 y.o. female.    Vitals:  height is 5' 5" (1.651 m) and weight is 57.2 kg (126 lb). Her temperature is 97.2 °F (36.2 °C). Her pulse is 72. Her oxygen saturation is 100%.     Chief Complaint: URI    39-year-old female with 7 day history intermittent congestion, intermittent cough, some shortness of breath, although not at rest, and headache.  History of allergic rhinitis and she has been around triggers.  Taking nothing for that at the moment.  No fever.  Metallic taste, although no loss of taste or smell.  No colored nasal drainage or sputum.  No known exposures to COVID.    URI   This is a new problem. The current episode started in the past 7 days. The problem has been gradually worsening. There has been no fever. Associated symptoms include congestion, coughing, headaches and a sore throat. Associated symptoms comments: Metallic taste in mouth . She has tried nothing for the symptoms.       Constitution: Positive for sweating.   HENT: Positive for congestion and sore throat.    Respiratory: Positive for chest tightness, cough and shortness of breath.    Neurological: Positive for headaches.       Objective:      Physical Exam   Constitutional: She is oriented to person, place, and time. She appears well-developed. She is cooperative.  Non-toxic appearance. She does not appear ill. No distress.   HENT:   Head: Normocephalic and atraumatic.   Right Ear: Hearing, tympanic membrane, external ear and ear canal normal.   Left Ear: Hearing, tympanic membrane, external ear and ear canal normal.   Nose: Nose normal. No mucosal edema, rhinorrhea or nasal deformity. No epistaxis. Right sinus exhibits no maxillary sinus tenderness and no frontal sinus tenderness. Left sinus exhibits no maxillary sinus tenderness and no frontal sinus tenderness.   Mouth/Throat: Uvula is midline, oropharynx is clear and moist and mucous membranes are normal. No trismus in the jaw. Normal " dentition. No uvula swelling. No oropharyngeal exudate, posterior oropharyngeal edema or posterior oropharyngeal erythema.      Comments: Pharynx with cobblestoning, otherwise negative.  Sinuses nontender  Eyes: Conjunctivae and lids are normal. No scleral icterus.   Neck: Trachea normal, full passive range of motion without pain and phonation normal. Neck supple. No neck rigidity. No edema and no erythema present.   Cardiovascular: Normal rate, regular rhythm, normal heart sounds and normal pulses.   Pulmonary/Chest: Effort normal and breath sounds normal. No respiratory distress. She has no decreased breath sounds. She has no wheezes. She has no rhonchi. She has no rales. She exhibits no tenderness.   Abdominal: Normal appearance.   Musculoskeletal: Normal range of motion.         General: No deformity.   Neurological: She is alert and oriented to person, place, and time. She exhibits normal muscle tone. Coordination normal.   Skin: Skin is warm, dry, intact, not diaphoretic and not pale.   Psychiatric: Her speech is normal and behavior is normal. Judgment and thought content normal.   Nursing note and vitals reviewed.        Assessment:       1. Allergic rhinitis, unspecified seasonality, unspecified trigger    2. Shortness of breath    3. Cough    4. Problems with smell and taste    5. Head ache        Plan:         Allergic rhinitis, unspecified seasonality, unspecified trigger  -     fluticasone propionate (FLONASE) 50 mcg/actuation nasal spray; 2 sprays (100 mcg total) by Each Nostril route once daily.  Dispense: 16 g; Refill: 8    Shortness of breath  -     COVID-19 Routine Screening    Cough  -     COVID-19 Routine Screening    Problems with smell and taste  -     COVID-19 Routine Screening    Head ache  -     COVID-19 Routine Screening    TRY ZYRTEC OR CLARITIN OR ALLEGRA DAILY AS NEEDED.      TRY USING THE FLONASE ON A REGULAR BASIS DURING DIFFICULT TIMES TO KEEP YOU FROM HAVING TO COME IN FOR A STEROID  SHOT OR PILLS OR FROM DEVELOPING A SINUSITIS OR EAR INFECTION.      WE WILL CALL YOU IN 5 DAYS WITH ANY POSITIVE RESULT, OR WE SEE YOU HAVE NOT VIEWED THE PATIENT PORTAL

## 2020-07-01 NOTE — PATIENT INSTRUCTIONS
Understanding Nasal Allergies  Nasal allergies (also called allergic rhinitis) are a common health problem. They may be seasonal. This means they cause symptoms only at certain times of the year. Or they may be perennial. This means they cause symptoms all year long. Other health problems, such as asthma, often occur along with allergies as well.    What is an allergic reaction?  An allergy is a reaction to a substance called an allergen. Common allergens include:  · Wind-borne pollen  · Mold  · Dust mites  · Furry and feathered animals  · Cockroaches  Normally, allergens are harmless. But when a person has allergies, the body thinks they are harmful. The body then attacks allergens with antibodies. Antibodies are attached to special cells called mast cells. Allergens stick to the antibodies. This makes the mast cells release histamine and other chemicals. This is an allergic reaction. The chemicals irritate nearby nasal tissue. This causes nasal allergy symptoms.  Common nasal allergy symptoms  Allergies can cause nasal tissue to swell. This makes the air passages smaller. The nose may feel stuffed up. The nose may also make extra mucus, which can plug the nasal passages or drip out of the nose. Mucus can drip down the back of the throat (postnasal drip) as well. Sinus tissue can swell. This may cause pain and headache. Common allergy symptoms include:  · Runny nose with clear, watery discharge  · Stuffy nose (nasal congestion)  · Drainage down your throat (postnasal drip)  · Sneezing  · Red, watery eyes  · Itchy nose, eyes, ears, and throat  · Plugged-up ears (ear congestion)  · Sore throat  · Coughing  · Sinus pain and swelling  · Headache  It may not be allergies  Other health problems can cause symptoms like those of nasal allergies. These include:  · Nonallergic rhinitis and viruses such as colds  · Irritants and pollutants, such as strong odors or smoke  · Certain medicines  · Changes in the weather    Treatment  Your healthcare provider will evaluate you to find the cause of your symptoms then recommend treatment. If your symptoms are due to nasal allergies, your healthcare provider may prescribe nasal steroid sprays or oral antihistamines to help reduce symptoms. Avoidance of the allergen will also be suggested. You may also be referred to an allergist.   Date Last Reviewed: 10/1/2016  © 5513-9466 "[employer]. [address] [place] [address], [place] [address]. All rights reserved. This information is not intended as a substitute for professional medical care. Always follow your healthcare professional's instructions.      Instructions for Patients with Confirmed or Suspected COVID-19    If you are awaiting your test result, you will either be called or it will be released to the patient portal.  If you have any questions about your test, please visit [email or web address] or call our COVID-19 information line at [phone].      Preventing the Spread of Coronavirus Disease 2019 (COVID-19) in Homes and Residential Communities -- Patients     Prevention steps for people with confirmed or suspected COVID-19 (including persons under investigation) who do not need to be hospitalized and people with confirmed COVID-19 who were hospitalized and determined to be medically stable to go home.      Stay home except to get medical care.    Separate yourself from other people and animals in your home.    Call ahead before visiting your doctor.    Wear a face mask.    Cover your coughs and sneezes.    Clean your hands often.    Avoid sharing personal household items.    Clean all high-touch surfaces every day.    Monitor your symptoms. Seek prompt medical attention if your illness is worsening (e.g., difficulty breathing). Before seeking care, call your healthcare provider.    If you have a medical emergency and must call 911, notify the dispatcher that you have or are being evaluated for  COVID-19. If possible, put on a face mask before emergency medical services arrive.    Use the following symptom-based strategy to return to normal activity following a suspected or confirmed case of COVID-19. Continue isolation until:   o At least 3 days (72 hours) have passed since recovery defined as resolution of fever without the use of fever-reducing medications and improvement in respiratory symptoms (e.g. cough, shortness of breath), and   o At least 10 days have passed since symptoms first appeared.     Precautions for household members, intimate partners and caregivers in a non-healthcare setting of a patient with symptomatic laboratory-confirmed COVID-19 or a patient under investigation.     Household members, intimate partners and caregivers in a non-healthcare setting may have close contact with a person with symptomatic, laboratory-confirmed COVID-19 or a person under investigation. Close contacts should monitor their health; they should call their healthcare provider right away if they develop symptoms suggestive of COVID-19 (e.g., fever, cough, shortness of breath). Close contacts should also follow these recommendations:     · Stay home for the duration of the time recommended by healthcare provider, except to get medical care. Separate yourself from other people and animals in the home.  · Monitor the patients symptoms. If the patient is getting sicker, call his or her healthcare provider. If the patient has a medical emergency and you need to call 911, notify the dispatch personnel that the patient has or is being evaluated for COVID-19.   · Wear a facemask when around other people such as sharing a room or vehicle and before entering a healthcare provider's office.  · Cover coughs and sneezes with a tissue. Throw used tissues in a lined trash can immediately and wash hands.  · Clean hands often with soap and water for at least 20 seconds or with an alcohol-based hand , rubbing hands  together until they feel dry. Avoid touching your eyes, nose, and mouth with unwashed hands.  · Clean all high-touch; surfaces every day, including counters, tabletops, doorknobs, bathroom fixtures, toilets, phones, keyboards, tablets, bedside tables, etc. Use a household cleaning spray or wipe according to label instructions.  · Avoid sharing personal household items such as dishes, drinking glasses, cups, towels, bedding, etc. After these items are used, they should be washed thoroughly with soap and water.  · Use the following symptom-based strategy to return to normal activity following a suspected or confirmed case of COVID-19. Continue isolation until:   · At least 3 days (72 hours) have passed since recovery defined as resolution of fever without the use of fever-reducing medications and improvement in respiratory symptoms (e.g. cough, shortness of breath), and   · At least 10 days have passed since symptoms first appeared.      TRY ZYRTEC OR CLARITIN OR ALLEGRA DAILY AS NEEDED.      TRY USING THE FLONASE ON A REGULAR BASIS DURING DIFFICULT TIMES TO KEEP YOU FROM HAVING TO COME IN FOR A STEROID SHOT OR PILLS OR FROM DEVELOPING A SINUSITIS OR EAR INFECTION.      WE WILL CALL YOU IN 5 DAYS WITH ANY POSITIVE RESULT, OR WE SEE YOU HAVE NOT VIEWED THE PATIENT PORTAL

## 2020-07-03 LAB — SARS-COV-2 RNA RESP QL NAA+PROBE: NEGATIVE

## 2020-09-22 ENCOUNTER — HOSPITAL ENCOUNTER (OUTPATIENT)
Dept: RADIOLOGY | Facility: OTHER | Age: 40
Discharge: HOME | End: 2020-09-22
Attending: OBSTETRICS & GYNECOLOGY
Payer: COMMERCIAL

## 2020-09-22 DIAGNOSIS — Z12.31 VISIT FOR SCREENING MAMMOGRAM: ICD-10-CM

## 2020-09-22 PROCEDURE — 77067 SCR MAMMO BI INCL CAD: CPT | Mod: TC

## 2020-09-22 PROCEDURE — 77067 MAMMO DIGITAL SCREENING BILAT WITH TOMOSYNTHESIS_CAD: ICD-10-PCS | Mod: 26,,, | Performed by: RADIOLOGY

## 2020-09-22 PROCEDURE — 77063 MAMMO DIGITAL SCREENING BILAT WITH TOMOSYNTHESIS_CAD: ICD-10-PCS | Mod: 26,,, | Performed by: RADIOLOGY

## 2020-09-22 PROCEDURE — 77067 SCR MAMMO BI INCL CAD: CPT | Mod: 26,,, | Performed by: RADIOLOGY

## 2020-09-22 PROCEDURE — 77063 BREAST TOMOSYNTHESIS BI: CPT | Mod: 26,,, | Performed by: RADIOLOGY

## 2020-09-23 ENCOUNTER — IMMUNIZATION (OUTPATIENT)
Dept: PHARMACY | Facility: CLINIC | Age: 40
End: 2020-09-23
Payer: COMMERCIAL

## 2020-09-23 ENCOUNTER — PATIENT MESSAGE (OUTPATIENT)
Dept: OBSTETRICS AND GYNECOLOGY | Facility: CLINIC | Age: 40
End: 2020-09-23

## 2020-10-05 ENCOUNTER — PATIENT MESSAGE (OUTPATIENT)
Dept: ADMINISTRATIVE | Facility: HOSPITAL | Age: 40
End: 2020-10-05

## 2021-01-21 ENCOUNTER — LAB VISIT (OUTPATIENT)
Dept: LAB | Facility: HOSPITAL | Age: 41
End: 2021-01-21
Attending: INTERNAL MEDICINE
Payer: COMMERCIAL

## 2021-01-21 ENCOUNTER — TELEPHONE (OUTPATIENT)
Dept: INTERNAL MEDICINE | Facility: CLINIC | Age: 41
End: 2021-01-21

## 2021-01-21 ENCOUNTER — OFFICE VISIT (OUTPATIENT)
Dept: INTERNAL MEDICINE | Facility: CLINIC | Age: 41
End: 2021-01-21
Payer: COMMERCIAL

## 2021-01-21 VITALS
WEIGHT: 126.31 LBS | SYSTOLIC BLOOD PRESSURE: 110 MMHG | HEART RATE: 67 BPM | BODY MASS INDEX: 21.04 KG/M2 | HEIGHT: 65 IN | OXYGEN SATURATION: 99 % | DIASTOLIC BLOOD PRESSURE: 76 MMHG

## 2021-01-21 DIAGNOSIS — D53.9 MACROCYTIC ANEMIA: Primary | ICD-10-CM

## 2021-01-21 DIAGNOSIS — G43.101 MIGRAINE WITH AURA AND WITH STATUS MIGRAINOSUS, NOT INTRACTABLE: ICD-10-CM

## 2021-01-21 DIAGNOSIS — D18.03 LIVER HEMANGIOMA: ICD-10-CM

## 2021-01-21 DIAGNOSIS — Z00.00 ANNUAL PHYSICAL EXAM: ICD-10-CM

## 2021-01-21 DIAGNOSIS — Z00.00 ANNUAL PHYSICAL EXAM: Primary | ICD-10-CM

## 2021-01-21 DIAGNOSIS — K21.9 GASTROESOPHAGEAL REFLUX DISEASE, UNSPECIFIED WHETHER ESOPHAGITIS PRESENT: ICD-10-CM

## 2021-01-21 DIAGNOSIS — F41.8 SITUATIONAL ANXIETY: ICD-10-CM

## 2021-01-21 LAB
ALBUMIN SERPL BCP-MCNC: 4 G/DL (ref 3.5–5.2)
ALP SERPL-CCNC: 41 U/L (ref 55–135)
ALT SERPL W/O P-5'-P-CCNC: 14 U/L (ref 10–44)
ANION GAP SERPL CALC-SCNC: 8 MMOL/L (ref 8–16)
AST SERPL-CCNC: 17 U/L (ref 10–40)
BASOPHILS # BLD AUTO: 0.03 K/UL (ref 0–0.2)
BASOPHILS NFR BLD: 0.5 % (ref 0–1.9)
BILIRUB SERPL-MCNC: 0.3 MG/DL (ref 0.1–1)
BUN SERPL-MCNC: 13 MG/DL (ref 6–20)
CALCIUM SERPL-MCNC: 9.3 MG/DL (ref 8.7–10.5)
CHLORIDE SERPL-SCNC: 105 MMOL/L (ref 95–110)
CHOLEST SERPL-MCNC: 255 MG/DL (ref 120–199)
CHOLEST/HDLC SERPL: 2.3 {RATIO} (ref 2–5)
CO2 SERPL-SCNC: 24 MMOL/L (ref 23–29)
CREAT SERPL-MCNC: 0.8 MG/DL (ref 0.5–1.4)
DIFFERENTIAL METHOD: ABNORMAL
EOSINOPHIL # BLD AUTO: 0.2 K/UL (ref 0–0.5)
EOSINOPHIL NFR BLD: 2.7 % (ref 0–8)
ERYTHROCYTE [DISTWIDTH] IN BLOOD BY AUTOMATED COUNT: 13.4 % (ref 11.5–14.5)
EST. GFR  (AFRICAN AMERICAN): >60 ML/MIN/1.73 M^2
EST. GFR  (NON AFRICAN AMERICAN): >60 ML/MIN/1.73 M^2
ESTIMATED AVG GLUCOSE: 94 MG/DL (ref 68–131)
GLUCOSE SERPL-MCNC: 95 MG/DL (ref 70–110)
HBA1C MFR BLD HPLC: 4.9 % (ref 4–5.6)
HCT VFR BLD AUTO: 36.8 % (ref 37–48.5)
HDLC SERPL-MCNC: 111 MG/DL (ref 40–75)
HDLC SERPL: 43.5 % (ref 20–50)
HGB BLD-MCNC: 11.9 G/DL (ref 12–16)
IMM GRANULOCYTES # BLD AUTO: 0.01 K/UL (ref 0–0.04)
IMM GRANULOCYTES NFR BLD AUTO: 0.2 % (ref 0–0.5)
LDLC SERPL CALC-MCNC: 130.8 MG/DL (ref 63–159)
LYMPHOCYTES # BLD AUTO: 2.9 K/UL (ref 1–4.8)
LYMPHOCYTES NFR BLD: 49.3 % (ref 18–48)
MCH RBC QN AUTO: 32.1 PG (ref 27–31)
MCHC RBC AUTO-ENTMCNC: 32.3 G/DL (ref 32–36)
MCV RBC AUTO: 99 FL (ref 82–98)
MONOCYTES # BLD AUTO: 0.5 K/UL (ref 0.3–1)
MONOCYTES NFR BLD: 8.1 % (ref 4–15)
NEUTROPHILS # BLD AUTO: 2.3 K/UL (ref 1.8–7.7)
NEUTROPHILS NFR BLD: 39.2 % (ref 38–73)
NONHDLC SERPL-MCNC: 144 MG/DL
NRBC BLD-RTO: 0 /100 WBC
PLATELET # BLD AUTO: 267 K/UL (ref 150–350)
PMV BLD AUTO: 10.8 FL (ref 9.2–12.9)
POTASSIUM SERPL-SCNC: 4.2 MMOL/L (ref 3.5–5.1)
PROT SERPL-MCNC: 6.9 G/DL (ref 6–8.4)
RBC # BLD AUTO: 3.71 M/UL (ref 4–5.4)
SODIUM SERPL-SCNC: 137 MMOL/L (ref 136–145)
T4 FREE SERPL-MCNC: 1.09 NG/DL (ref 0.71–1.51)
TRIGL SERPL-MCNC: 66 MG/DL (ref 30–150)
TSH SERPL DL<=0.005 MIU/L-ACNC: 0.87 UIU/ML (ref 0.4–4)
WBC # BLD AUTO: 5.96 K/UL (ref 3.9–12.7)

## 2021-01-21 PROCEDURE — 36415 COLL VENOUS BLD VENIPUNCTURE: CPT

## 2021-01-21 PROCEDURE — 84439 ASSAY OF FREE THYROXINE: CPT

## 2021-01-21 PROCEDURE — 1126F AMNT PAIN NOTED NONE PRSNT: CPT | Mod: S$GLB,,, | Performed by: INTERNAL MEDICINE

## 2021-01-21 PROCEDURE — 99999 PR PBB SHADOW E&M-EST. PATIENT-LVL IV: ICD-10-PCS | Mod: PBBFAC,,, | Performed by: INTERNAL MEDICINE

## 2021-01-21 PROCEDURE — 85025 COMPLETE CBC W/AUTO DIFF WBC: CPT

## 2021-01-21 PROCEDURE — 99396 PR PREVENTIVE VISIT,EST,40-64: ICD-10-PCS | Mod: S$GLB,,, | Performed by: INTERNAL MEDICINE

## 2021-01-21 PROCEDURE — 99396 PREV VISIT EST AGE 40-64: CPT | Mod: S$GLB,,, | Performed by: INTERNAL MEDICINE

## 2021-01-21 PROCEDURE — 99999 PR PBB SHADOW E&M-EST. PATIENT-LVL IV: CPT | Mod: PBBFAC,,, | Performed by: INTERNAL MEDICINE

## 2021-01-21 PROCEDURE — 1126F PR PAIN SEVERITY QUANTIFIED, NO PAIN PRESENT: ICD-10-PCS | Mod: S$GLB,,, | Performed by: INTERNAL MEDICINE

## 2021-01-21 PROCEDURE — 3008F BODY MASS INDEX DOCD: CPT | Mod: CPTII,S$GLB,, | Performed by: INTERNAL MEDICINE

## 2021-01-21 PROCEDURE — 3008F PR BODY MASS INDEX (BMI) DOCUMENTED: ICD-10-PCS | Mod: CPTII,S$GLB,, | Performed by: INTERNAL MEDICINE

## 2021-01-21 PROCEDURE — 83036 HEMOGLOBIN GLYCOSYLATED A1C: CPT

## 2021-01-21 PROCEDURE — 84443 ASSAY THYROID STIM HORMONE: CPT

## 2021-01-21 PROCEDURE — 80061 LIPID PANEL: CPT

## 2021-01-21 PROCEDURE — 80053 COMPREHEN METABOLIC PANEL: CPT

## 2021-02-10 ENCOUNTER — IMMUNIZATION (OUTPATIENT)
Dept: PRIMARY CARE CLINIC | Facility: CLINIC | Age: 41
End: 2021-02-10
Payer: COMMERCIAL

## 2021-02-10 DIAGNOSIS — Z23 NEED FOR VACCINATION: Primary | ICD-10-CM

## 2021-02-10 PROCEDURE — 91300 PR SARS-COV- 2 COVID-19 VACCINE, NO PRSV, 30MCG/0.3ML, IM: CPT | Mod: PBBFAC | Performed by: INTERNAL MEDICINE

## 2021-02-10 PROCEDURE — 0001A PR IMMUNIZ ADMIN, SARS-COV-2 COVID-19 VACC, 30MCG/0.3ML, 1ST DOSE: CPT | Mod: PBBFAC | Performed by: INTERNAL MEDICINE

## 2021-02-10 RX ADMIN — RNA INGREDIENT BNT-162B2 0.3 ML: 0.23 INJECTION, SUSPENSION INTRAMUSCULAR at 12:02

## 2021-02-22 ENCOUNTER — TELEPHONE (OUTPATIENT)
Dept: INTERNAL MEDICINE | Facility: CLINIC | Age: 41
End: 2021-02-22

## 2021-02-22 ENCOUNTER — LAB VISIT (OUTPATIENT)
Dept: LAB | Facility: HOSPITAL | Age: 41
End: 2021-02-22
Attending: INTERNAL MEDICINE
Payer: COMMERCIAL

## 2021-02-22 DIAGNOSIS — D64.9 NORMOCYTIC ANEMIA: Primary | ICD-10-CM

## 2021-02-22 DIAGNOSIS — D53.9 MACROCYTIC ANEMIA: ICD-10-CM

## 2021-02-22 LAB
BASOPHILS # BLD AUTO: 0.03 K/UL (ref 0–0.2)
BASOPHILS NFR BLD: 0.6 % (ref 0–1.9)
DIFFERENTIAL METHOD: ABNORMAL
EOSINOPHIL # BLD AUTO: 0.1 K/UL (ref 0–0.5)
EOSINOPHIL NFR BLD: 2.6 % (ref 0–8)
ERYTHROCYTE [DISTWIDTH] IN BLOOD BY AUTOMATED COUNT: 13.3 % (ref 11.5–14.5)
HCT VFR BLD AUTO: 34.9 % (ref 37–48.5)
HGB BLD-MCNC: 11.5 G/DL (ref 12–16)
IMM GRANULOCYTES # BLD AUTO: 0.01 K/UL (ref 0–0.04)
IMM GRANULOCYTES NFR BLD AUTO: 0.2 % (ref 0–0.5)
LYMPHOCYTES # BLD AUTO: 2.6 K/UL (ref 1–4.8)
LYMPHOCYTES NFR BLD: 55.4 % (ref 18–48)
MCH RBC QN AUTO: 32 PG (ref 27–31)
MCHC RBC AUTO-ENTMCNC: 33 G/DL (ref 32–36)
MCV RBC AUTO: 97 FL (ref 82–98)
MONOCYTES # BLD AUTO: 0.3 K/UL (ref 0.3–1)
MONOCYTES NFR BLD: 7.4 % (ref 4–15)
NEUTROPHILS # BLD AUTO: 1.6 K/UL (ref 1.8–7.7)
NEUTROPHILS NFR BLD: 33.8 % (ref 38–73)
NRBC BLD-RTO: 0 /100 WBC
PLATELET # BLD AUTO: 259 K/UL (ref 150–350)
PMV BLD AUTO: 10.7 FL (ref 9.2–12.9)
RBC # BLD AUTO: 3.59 M/UL (ref 4–5.4)
WBC # BLD AUTO: 4.62 K/UL (ref 3.9–12.7)

## 2021-02-22 PROCEDURE — 85025 COMPLETE CBC W/AUTO DIFF WBC: CPT

## 2021-02-22 PROCEDURE — 36415 COLL VENOUS BLD VENIPUNCTURE: CPT

## 2021-03-03 ENCOUNTER — IMMUNIZATION (OUTPATIENT)
Dept: PRIMARY CARE CLINIC | Facility: CLINIC | Age: 41
End: 2021-03-03
Payer: COMMERCIAL

## 2021-03-03 DIAGNOSIS — Z23 NEED FOR VACCINATION: Primary | ICD-10-CM

## 2021-03-03 PROCEDURE — 0002A PR IMMUNIZ ADMIN, SARS-COV-2 COVID-19 VACC, 30MCG/0.3ML, 2ND DOSE: ICD-10-PCS | Mod: CV19,S$GLB,, | Performed by: INTERNAL MEDICINE

## 2021-03-03 PROCEDURE — 0002A PR IMMUNIZ ADMIN, SARS-COV-2 COVID-19 VACC, 30MCG/0.3ML, 2ND DOSE: CPT | Mod: CV19,S$GLB,, | Performed by: INTERNAL MEDICINE

## 2021-03-03 PROCEDURE — 91300 PR SARS-COV- 2 COVID-19 VACCINE, NO PRSV, 30MCG/0.3ML, IM: ICD-10-PCS | Mod: S$GLB,,, | Performed by: INTERNAL MEDICINE

## 2021-03-03 PROCEDURE — 91300 PR SARS-COV- 2 COVID-19 VACCINE, NO PRSV, 30MCG/0.3ML, IM: CPT | Mod: S$GLB,,, | Performed by: INTERNAL MEDICINE

## 2021-03-03 RX ADMIN — Medication 0.3 ML: at 12:03

## 2021-03-10 ENCOUNTER — PATIENT OUTREACH (OUTPATIENT)
Dept: ADMINISTRATIVE | Facility: OTHER | Age: 41
End: 2021-03-10

## 2021-03-11 ENCOUNTER — OFFICE VISIT (OUTPATIENT)
Dept: OPHTHALMOLOGY | Facility: CLINIC | Age: 41
End: 2021-03-11
Payer: COMMERCIAL

## 2021-03-11 DIAGNOSIS — H31.003 CHORIORETINAL SCAR OF BOTH EYES: Primary | ICD-10-CM

## 2021-03-11 PROCEDURE — 1126F AMNT PAIN NOTED NONE PRSNT: CPT | Mod: S$GLB,,, | Performed by: OPHTHALMOLOGY

## 2021-03-11 PROCEDURE — 99999 PR PBB SHADOW E&M-EST. PATIENT-LVL III: ICD-10-PCS | Mod: PBBFAC,,, | Performed by: OPHTHALMOLOGY

## 2021-03-11 PROCEDURE — 99999 PR PBB SHADOW E&M-EST. PATIENT-LVL III: CPT | Mod: PBBFAC,,, | Performed by: OPHTHALMOLOGY

## 2021-03-11 PROCEDURE — 92014 COMPRE OPH EXAM EST PT 1/>: CPT | Mod: S$GLB,,, | Performed by: OPHTHALMOLOGY

## 2021-03-11 PROCEDURE — 92201 OPSCPY EXTND RTA DRAW UNI/BI: CPT | Mod: S$GLB,,, | Performed by: OPHTHALMOLOGY

## 2021-03-11 PROCEDURE — 92014 PR EYE EXAM, EST PATIENT,COMPREHESV: ICD-10-PCS | Mod: S$GLB,,, | Performed by: OPHTHALMOLOGY

## 2021-03-11 PROCEDURE — 1126F PR PAIN SEVERITY QUANTIFIED, NO PAIN PRESENT: ICD-10-PCS | Mod: S$GLB,,, | Performed by: OPHTHALMOLOGY

## 2021-03-11 PROCEDURE — 92201 PR OPHTHALMOSCOPY, EXT, W/RET DRAW/SCLERAL DEPR, I&R, UNI/BI: ICD-10-PCS | Mod: S$GLB,,, | Performed by: OPHTHALMOLOGY

## 2021-06-22 ENCOUNTER — OFFICE VISIT (OUTPATIENT)
Dept: OBSTETRICS AND GYNECOLOGY | Facility: CLINIC | Age: 41
End: 2021-06-22
Attending: OBSTETRICS & GYNECOLOGY
Payer: COMMERCIAL

## 2021-06-22 VITALS
DIASTOLIC BLOOD PRESSURE: 78 MMHG | SYSTOLIC BLOOD PRESSURE: 120 MMHG | WEIGHT: 119.06 LBS | BODY MASS INDEX: 19.83 KG/M2 | HEIGHT: 65 IN

## 2021-06-22 DIAGNOSIS — Z12.4 PAP SMEAR FOR CERVICAL CANCER SCREENING: ICD-10-CM

## 2021-06-22 DIAGNOSIS — Z12.31 VISIT FOR SCREENING MAMMOGRAM: ICD-10-CM

## 2021-06-22 DIAGNOSIS — Z01.419 WOMEN'S ANNUAL ROUTINE GYNECOLOGICAL EXAMINATION: Primary | ICD-10-CM

## 2021-06-22 DIAGNOSIS — Z30.41 ENCOUNTER FOR SURVEILLANCE OF CONTRACEPTIVE PILLS: ICD-10-CM

## 2021-06-22 PROCEDURE — 87625 HPV TYPES 16 & 18 ONLY: CPT | Performed by: OBSTETRICS & GYNECOLOGY

## 2021-06-22 PROCEDURE — 99999 PR PBB SHADOW E&M-EST. PATIENT-LVL III: ICD-10-PCS | Mod: PBBFAC,,, | Performed by: OBSTETRICS & GYNECOLOGY

## 2021-06-22 PROCEDURE — 99999 PR PBB SHADOW E&M-EST. PATIENT-LVL III: CPT | Mod: PBBFAC,,, | Performed by: OBSTETRICS & GYNECOLOGY

## 2021-06-22 PROCEDURE — 99396 PR PREVENTIVE VISIT,EST,40-64: ICD-10-PCS | Mod: S$GLB,,, | Performed by: OBSTETRICS & GYNECOLOGY

## 2021-06-22 PROCEDURE — 99213 OFFICE O/P EST LOW 20 MIN: CPT | Mod: PBBFAC | Performed by: OBSTETRICS & GYNECOLOGY

## 2021-06-22 PROCEDURE — 99396 PREV VISIT EST AGE 40-64: CPT | Mod: S$GLB,,, | Performed by: OBSTETRICS & GYNECOLOGY

## 2021-06-22 PROCEDURE — 88175 CYTOPATH C/V AUTO FLUID REDO: CPT | Performed by: OBSTETRICS & GYNECOLOGY

## 2021-06-22 PROCEDURE — 87624 HPV HI-RISK TYP POOLED RSLT: CPT | Performed by: OBSTETRICS & GYNECOLOGY

## 2021-06-22 RX ORDER — TRAZODONE HYDROCHLORIDE 50 MG/1
TABLET ORAL
COMMUNITY
Start: 2021-01-26

## 2021-06-22 RX ORDER — LORAZEPAM 1 MG/1
1 TABLET ORAL DAILY PRN
COMMUNITY
Start: 2021-03-02

## 2021-06-22 RX ORDER — NORETHINDRONE ACETATE AND ETHINYL ESTRADIOL .02; 1 MG/1; MG/1
1 TABLET ORAL DAILY
Qty: 63 TABLET | Refills: 4 | Status: SHIPPED | OUTPATIENT
Start: 2021-06-22 | End: 2022-07-10

## 2021-06-22 RX ORDER — MULTIVITAMIN
1 TABLET ORAL DAILY
COMMUNITY
End: 2023-10-13

## 2021-06-28 ENCOUNTER — PATIENT MESSAGE (OUTPATIENT)
Dept: OBSTETRICS AND GYNECOLOGY | Facility: CLINIC | Age: 41
End: 2021-06-28

## 2021-06-28 LAB
CLINICAL INFO: NORMAL
CYTO CVX: NORMAL
CYTOLOGIST CVX/VAG CYTO: NORMAL
CYTOLOGIST CVX/VAG CYTO: NORMAL
CYTOLOGY CMNT CVX/VAG CYTO-IMP: NORMAL
CYTOLOGY PAP THIN PREP EXPLANATION: NORMAL
DATE OF PREVIOUS PAP: NORMAL
DATE PREVIOUS BX: YES
HPV I/H RISK 4 DNA CVX QL NAA+PROBE: DETECTED
LMP START DATE: NORMAL
MICROORGANISM CVX/VAG CYTO: NORMAL
SPECIMEN SOURCE CVX/VAG CYTO: NORMAL
STAT OF ADQ CVX/VAG CYTO-IMP: NORMAL

## 2021-06-29 LAB
HPV16 DNA CVX QL PROBE+SIG AMP: NOT DETECTED
HPV18 DNA CVX QL PROBE+SIG AMP: NOT DETECTED

## 2021-07-01 ENCOUNTER — TELEPHONE (OUTPATIENT)
Dept: OBSTETRICS AND GYNECOLOGY | Facility: CLINIC | Age: 41
End: 2021-07-01

## 2021-07-02 ENCOUNTER — TELEPHONE (OUTPATIENT)
Dept: OBSTETRICS AND GYNECOLOGY | Facility: CLINIC | Age: 41
End: 2021-07-02

## 2021-07-02 RX ORDER — METRONIDAZOLE 500 MG/1
500 TABLET ORAL 2 TIMES DAILY
Qty: 14 TABLET | Refills: 0 | Status: SHIPPED | OUTPATIENT
Start: 2021-07-02 | End: 2021-07-09

## 2021-10-04 ENCOUNTER — PATIENT MESSAGE (OUTPATIENT)
Dept: ADMINISTRATIVE | Facility: HOSPITAL | Age: 41
End: 2021-10-04

## 2021-10-12 ENCOUNTER — OFFICE VISIT (OUTPATIENT)
Dept: INTERNAL MEDICINE | Facility: CLINIC | Age: 41
End: 2021-10-12
Payer: COMMERCIAL

## 2021-10-12 VITALS
OXYGEN SATURATION: 99 % | BODY MASS INDEX: 20.46 KG/M2 | HEIGHT: 65 IN | DIASTOLIC BLOOD PRESSURE: 78 MMHG | WEIGHT: 122.81 LBS | HEART RATE: 86 BPM | SYSTOLIC BLOOD PRESSURE: 125 MMHG

## 2021-10-12 DIAGNOSIS — H00.011 HORDEOLUM EXTERNUM OF RIGHT UPPER EYELID: Primary | ICD-10-CM

## 2021-10-12 PROCEDURE — 99212 PR OFFICE/OUTPT VISIT, EST, LEVL II, 10-19 MIN: ICD-10-PCS | Mod: S$GLB,,, | Performed by: STUDENT IN AN ORGANIZED HEALTH CARE EDUCATION/TRAINING PROGRAM

## 2021-10-12 PROCEDURE — 99999 PR PBB SHADOW E&M-EST. PATIENT-LVL III: ICD-10-PCS | Mod: PBBFAC,,, | Performed by: STUDENT IN AN ORGANIZED HEALTH CARE EDUCATION/TRAINING PROGRAM

## 2021-10-12 PROCEDURE — 99999 PR PBB SHADOW E&M-EST. PATIENT-LVL III: CPT | Mod: PBBFAC,,, | Performed by: STUDENT IN AN ORGANIZED HEALTH CARE EDUCATION/TRAINING PROGRAM

## 2021-10-12 PROCEDURE — 99212 OFFICE O/P EST SF 10 MIN: CPT | Mod: S$GLB,,, | Performed by: STUDENT IN AN ORGANIZED HEALTH CARE EDUCATION/TRAINING PROGRAM

## 2021-10-13 ENCOUNTER — HOSPITAL ENCOUNTER (OUTPATIENT)
Dept: RADIOLOGY | Facility: OTHER | Age: 41
Discharge: HOME | End: 2021-10-13
Attending: OBSTETRICS & GYNECOLOGY
Payer: COMMERCIAL

## 2021-10-13 DIAGNOSIS — Z12.31 VISIT FOR SCREENING MAMMOGRAM: ICD-10-CM

## 2021-10-13 PROCEDURE — 77063 BREAST TOMOSYNTHESIS BI: CPT | Mod: 26,,, | Performed by: RADIOLOGY

## 2021-10-13 PROCEDURE — 77067 MAMMO DIGITAL SCREENING BILAT WITH TOMO: ICD-10-PCS | Mod: 26,,, | Performed by: RADIOLOGY

## 2021-10-13 PROCEDURE — 77063 MAMMO DIGITAL SCREENING BILAT WITH TOMO: ICD-10-PCS | Mod: 26,,, | Performed by: RADIOLOGY

## 2021-10-13 PROCEDURE — 77067 SCR MAMMO BI INCL CAD: CPT | Mod: 26,,, | Performed by: RADIOLOGY

## 2021-10-13 PROCEDURE — 77067 SCR MAMMO BI INCL CAD: CPT | Mod: TC

## 2021-10-14 ENCOUNTER — PATIENT MESSAGE (OUTPATIENT)
Dept: OBSTETRICS AND GYNECOLOGY | Facility: CLINIC | Age: 41
End: 2021-10-14
Payer: COMMERCIAL

## 2021-10-14 ENCOUNTER — PATIENT MESSAGE (OUTPATIENT)
Dept: OBSTETRICS AND GYNECOLOGY | Facility: CLINIC | Age: 41
End: 2021-10-14

## 2021-10-25 ENCOUNTER — PATIENT MESSAGE (OUTPATIENT)
Dept: OBSTETRICS AND GYNECOLOGY | Facility: CLINIC | Age: 41
End: 2021-10-25
Payer: COMMERCIAL

## 2021-10-25 ENCOUNTER — TELEPHONE (OUTPATIENT)
Dept: OBSTETRICS AND GYNECOLOGY | Facility: CLINIC | Age: 41
End: 2021-10-25
Payer: COMMERCIAL

## 2021-10-26 RX ORDER — SECNIDAZOLE 2 G/4.8G
1 GRANULE ORAL ONCE
Qty: 1 PACKET | Refills: 1 | Status: SHIPPED | OUTPATIENT
Start: 2021-10-26 | End: 2021-10-26

## 2021-11-01 ENCOUNTER — TELEPHONE (OUTPATIENT)
Dept: OBSTETRICS AND GYNECOLOGY | Facility: CLINIC | Age: 41
End: 2021-11-01
Payer: COMMERCIAL

## 2021-11-15 ENCOUNTER — PATIENT OUTREACH (OUTPATIENT)
Dept: ADMINISTRATIVE | Facility: OTHER | Age: 41
End: 2021-11-15
Payer: COMMERCIAL

## 2021-11-23 ENCOUNTER — OFFICE VISIT (OUTPATIENT)
Dept: OBSTETRICS AND GYNECOLOGY | Facility: CLINIC | Age: 41
End: 2021-11-23
Payer: COMMERCIAL

## 2021-11-23 VITALS
BODY MASS INDEX: 19.83 KG/M2 | DIASTOLIC BLOOD PRESSURE: 72 MMHG | SYSTOLIC BLOOD PRESSURE: 124 MMHG | WEIGHT: 119.06 LBS | HEIGHT: 65 IN

## 2021-11-23 DIAGNOSIS — Z71.85 HPV VACCINE COUNSELING: ICD-10-CM

## 2021-11-23 DIAGNOSIS — N76.2 ACUTE VULVITIS: Primary | ICD-10-CM

## 2021-11-23 PROCEDURE — 87481 CANDIDA DNA AMP PROBE: CPT | Mod: 59 | Performed by: OBSTETRICS & GYNECOLOGY

## 2021-11-23 PROCEDURE — 99999 PR PBB SHADOW E&M-EST. PATIENT-LVL III: ICD-10-PCS | Mod: PBBFAC,,, | Performed by: OBSTETRICS & GYNECOLOGY

## 2021-11-23 PROCEDURE — 99213 PR OFFICE/OUTPT VISIT, EST, LEVL III, 20-29 MIN: ICD-10-PCS | Mod: S$GLB,,, | Performed by: OBSTETRICS & GYNECOLOGY

## 2021-11-23 PROCEDURE — 99213 OFFICE O/P EST LOW 20 MIN: CPT | Mod: S$GLB,,, | Performed by: OBSTETRICS & GYNECOLOGY

## 2021-11-23 PROCEDURE — 99999 PR PBB SHADOW E&M-EST. PATIENT-LVL III: CPT | Mod: PBBFAC,,, | Performed by: OBSTETRICS & GYNECOLOGY

## 2021-11-23 RX ORDER — NYSTATIN AND TRIAMCINOLONE ACETONIDE 100000; 1 [USP'U]/G; MG/G
OINTMENT TOPICAL
COMMUNITY
Start: 2021-11-21 | End: 2021-11-23

## 2021-11-23 RX ORDER — CLOTRIMAZOLE AND BETAMETHASONE DIPROPIONATE 10; .64 MG/G; MG/G
CREAM TOPICAL
Qty: 45 G | Refills: 3 | Status: SHIPPED | OUTPATIENT
Start: 2021-11-23 | End: 2023-10-13

## 2021-11-29 ENCOUNTER — CLINICAL SUPPORT (OUTPATIENT)
Dept: OBSTETRICS AND GYNECOLOGY | Facility: CLINIC | Age: 41
End: 2021-11-29
Payer: COMMERCIAL

## 2021-11-29 DIAGNOSIS — Z23 NEED FOR HPV VACCINATION: Primary | ICD-10-CM

## 2021-11-29 PROCEDURE — 90471 HPV VACCINE QUADRIVALENT 3 DOSE IM: ICD-10-PCS | Mod: S$GLB,,, | Performed by: OBSTETRICS & GYNECOLOGY

## 2021-11-29 PROCEDURE — 90649 4VHPV VACCINE 3 DOSE IM: CPT | Mod: S$GLB,,, | Performed by: OBSTETRICS & GYNECOLOGY

## 2021-11-29 PROCEDURE — 90471 IMMUNIZATION ADMIN: CPT | Mod: S$GLB,,, | Performed by: OBSTETRICS & GYNECOLOGY

## 2021-11-29 PROCEDURE — 90649 HPV VACCINE QUADRIVALENT 3 DOSE IM: ICD-10-PCS | Mod: S$GLB,,, | Performed by: OBSTETRICS & GYNECOLOGY

## 2021-12-07 ENCOUNTER — OFFICE VISIT (OUTPATIENT)
Dept: OBSTETRICS AND GYNECOLOGY | Facility: CLINIC | Age: 41
End: 2021-12-07
Payer: COMMERCIAL

## 2021-12-07 VITALS
HEIGHT: 65 IN | BODY MASS INDEX: 19.98 KG/M2 | SYSTOLIC BLOOD PRESSURE: 126 MMHG | DIASTOLIC BLOOD PRESSURE: 64 MMHG | WEIGHT: 119.94 LBS

## 2021-12-07 DIAGNOSIS — N34.2 URETHRITIS: Primary | ICD-10-CM

## 2021-12-07 PROCEDURE — 87481 CANDIDA DNA AMP PROBE: CPT | Mod: 59 | Performed by: OBSTETRICS & GYNECOLOGY

## 2021-12-07 PROCEDURE — 87591 N.GONORRHOEAE DNA AMP PROB: CPT | Mod: 59 | Performed by: OBSTETRICS & GYNECOLOGY

## 2021-12-07 PROCEDURE — 99213 PR OFFICE/OUTPT VISIT, EST, LEVL III, 20-29 MIN: ICD-10-PCS | Mod: S$GLB,,, | Performed by: OBSTETRICS & GYNECOLOGY

## 2021-12-07 PROCEDURE — 99999 PR PBB SHADOW E&M-EST. PATIENT-LVL III: ICD-10-PCS | Mod: PBBFAC,,, | Performed by: OBSTETRICS & GYNECOLOGY

## 2021-12-07 PROCEDURE — 87491 CHLMYD TRACH DNA AMP PROBE: CPT | Performed by: OBSTETRICS & GYNECOLOGY

## 2021-12-07 PROCEDURE — 99999 PR PBB SHADOW E&M-EST. PATIENT-LVL III: CPT | Mod: PBBFAC,,, | Performed by: OBSTETRICS & GYNECOLOGY

## 2021-12-07 PROCEDURE — 99213 OFFICE O/P EST LOW 20 MIN: CPT | Mod: S$GLB,,, | Performed by: OBSTETRICS & GYNECOLOGY

## 2021-12-11 LAB
BACTERIAL VAGINOSIS DNA: NEGATIVE
CANDIDA GLABRATA DNA: NEGATIVE
CANDIDA KRUSEI DNA: NEGATIVE
CANDIDA RRNA VAG QL PROBE: NEGATIVE
T VAGINALIS RRNA GENITAL QL PROBE: NEGATIVE

## 2021-12-13 LAB
BACTERIAL VAGINOSIS DNA: NORMAL
C TRACH DNA SPEC QL NAA+PROBE: NOT DETECTED
CANDIDA GLABRATA DNA: NORMAL
CANDIDA KRUSEI DNA: NORMAL
CANDIDA RRNA VAG QL PROBE: NORMAL
N GONORRHOEA DNA SPEC QL NAA+PROBE: NOT DETECTED
T VAGINALIS RRNA GENITAL QL PROBE: NORMAL

## 2022-01-03 ENCOUNTER — PATIENT MESSAGE (OUTPATIENT)
Dept: OBSTETRICS AND GYNECOLOGY | Facility: CLINIC | Age: 42
End: 2022-01-03

## 2022-01-03 ENCOUNTER — OFFICE VISIT (OUTPATIENT)
Dept: OBSTETRICS AND GYNECOLOGY | Facility: CLINIC | Age: 42
End: 2022-01-03
Payer: COMMERCIAL

## 2022-01-03 VITALS
HEIGHT: 65 IN | SYSTOLIC BLOOD PRESSURE: 132 MMHG | DIASTOLIC BLOOD PRESSURE: 78 MMHG | WEIGHT: 122.81 LBS | BODY MASS INDEX: 20.46 KG/M2

## 2022-01-03 DIAGNOSIS — R39.89 SUSPECTED UTI: Primary | ICD-10-CM

## 2022-01-03 PROCEDURE — 99213 PR OFFICE/OUTPT VISIT, EST, LEVL III, 20-29 MIN: ICD-10-PCS | Mod: S$GLB,,, | Performed by: NURSE PRACTITIONER

## 2022-01-03 PROCEDURE — 87086 URINE CULTURE/COLONY COUNT: CPT | Performed by: NURSE PRACTITIONER

## 2022-01-03 PROCEDURE — 87186 SC STD MICRODIL/AGAR DIL: CPT | Performed by: NURSE PRACTITIONER

## 2022-01-03 PROCEDURE — 1160F PR REVIEW ALL MEDS BY PRESCRIBER/CLIN PHARMACIST DOCUMENTED: ICD-10-PCS | Mod: CPTII,S$GLB,, | Performed by: NURSE PRACTITIONER

## 2022-01-03 PROCEDURE — 3008F PR BODY MASS INDEX (BMI) DOCUMENTED: ICD-10-PCS | Mod: CPTII,S$GLB,, | Performed by: NURSE PRACTITIONER

## 2022-01-03 PROCEDURE — 99213 OFFICE O/P EST LOW 20 MIN: CPT | Mod: S$GLB,,, | Performed by: NURSE PRACTITIONER

## 2022-01-03 PROCEDURE — 87088 URINE BACTERIA CULTURE: CPT | Performed by: NURSE PRACTITIONER

## 2022-01-03 PROCEDURE — 3078F PR MOST RECENT DIASTOLIC BLOOD PRESSURE < 80 MM HG: ICD-10-PCS | Mod: CPTII,S$GLB,, | Performed by: NURSE PRACTITIONER

## 2022-01-03 PROCEDURE — 3008F BODY MASS INDEX DOCD: CPT | Mod: CPTII,S$GLB,, | Performed by: NURSE PRACTITIONER

## 2022-01-03 PROCEDURE — 99999 PR PBB SHADOW E&M-EST. PATIENT-LVL III: CPT | Mod: PBBFAC,,, | Performed by: NURSE PRACTITIONER

## 2022-01-03 PROCEDURE — 87077 CULTURE AEROBIC IDENTIFY: CPT | Performed by: NURSE PRACTITIONER

## 2022-01-03 PROCEDURE — 3075F SYST BP GE 130 - 139MM HG: CPT | Mod: CPTII,S$GLB,, | Performed by: NURSE PRACTITIONER

## 2022-01-03 PROCEDURE — 3075F PR MOST RECENT SYSTOLIC BLOOD PRESS GE 130-139MM HG: ICD-10-PCS | Mod: CPTII,S$GLB,, | Performed by: NURSE PRACTITIONER

## 2022-01-03 PROCEDURE — 1160F RVW MEDS BY RX/DR IN RCRD: CPT | Mod: CPTII,S$GLB,, | Performed by: NURSE PRACTITIONER

## 2022-01-03 PROCEDURE — 1159F PR MEDICATION LIST DOCUMENTED IN MEDICAL RECORD: ICD-10-PCS | Mod: CPTII,S$GLB,, | Performed by: NURSE PRACTITIONER

## 2022-01-03 PROCEDURE — 99999 PR PBB SHADOW E&M-EST. PATIENT-LVL III: ICD-10-PCS | Mod: PBBFAC,,, | Performed by: NURSE PRACTITIONER

## 2022-01-03 PROCEDURE — 3078F DIAST BP <80 MM HG: CPT | Mod: CPTII,S$GLB,, | Performed by: NURSE PRACTITIONER

## 2022-01-03 PROCEDURE — 1159F MED LIST DOCD IN RCRD: CPT | Mod: CPTII,S$GLB,, | Performed by: NURSE PRACTITIONER

## 2022-01-03 RX ORDER — NITROFURANTOIN 25; 75 MG/1; MG/1
100 CAPSULE ORAL 2 TIMES DAILY
Qty: 10 CAPSULE | Refills: 0 | Status: SHIPPED | OUTPATIENT
Start: 2022-01-03 | End: 2022-01-08

## 2022-01-03 NOTE — PROGRESS NOTES
CC: Dysuria    HPI: Pt is a 41 y.o.  female who presents for evaluation of her UTI symptoms.   The patient presents today with dysuria, frequency, and urgency for the past 2 days. The patient denies flank pain, fever, nausea, vomiting, and hematuria. She reports UTI in early December 2021, new sexual partner.      ROS:  GENERAL: Feeling well overall. Denies fever or chills.   SKIN: Denies rash or lesions.   HEAD: Denies head injury or headache.   NODES: Denies enlarged lymph nodes.   CHEST: Denies chest pain or shortness of breath.   CARDIOVASCULAR: Denies palpitations or left sided chest pain.   ABDOMEN: No abdominal pain, constipation, diarrhea, nausea, vomiting or rectal bleeding.   URINARY: + dysuria, hematuria, or burning on urination.  REPRODUCTIVE: See HPI.   BREASTS: Denies pain, lumps, or nipple discharge.   HEMATOLOGIC: No easy bruisability or excessive bleeding.   MUSCULOSKELETAL: Denies joint pain or swelling.   NEUROLOGIC: Denies syncope or weakness.   PSYCHIATRIC: Denies depression, anxiety or mood swings.    PE:   APPEARANCE: Well nourished, well developed, White female in no acute distress.  PELVIS: Deferred  ABDOMEN: No suprapubic tenderness  MUSCULOSKELETAL: No CVA tenderness      Diagnosis:  1. Suspected UTI        Plan:     Orders Placed This Encounter    Urine culture    nitrofurantoin, macrocrystal-monohydrate, (MACROBID) 100 MG capsule     Urine dip: +nitrates. UC, Macrobid.    Discussed if continued UTIs post coitus- may benefit from prophylactic Macrobid post intercourse.    -UTI prevention including   a. perineal hygiene, wipe front to back,  b. drink water prior to intercourse and urinate afterwards and avoid certain positions which could increase likelyhood of UTIs,  c. establish regular bladder habits,   d. avoid vulvovaginal irritants,   e. increase fluids and avoid caffeine and alcohol;  -signs of pylenephritis and to go to the ED if develops fever, chills, n/v, back pain,  worsening dyuria, hematuria;   -pelvic rest until symptoms resolve;  -Macrobid use and potential side effects;  -A.C.S. Pap and pelvic exam guidelines (pap every 3 years), recomendations for yearly mammogram;  -to follow up with her PCP for other health maintenance.       Follow-up PRN no resolution of symptoms.      Kelsi De La Torre, MARSHALLP-C

## 2022-01-06 ENCOUNTER — PATIENT MESSAGE (OUTPATIENT)
Dept: OBSTETRICS AND GYNECOLOGY | Facility: CLINIC | Age: 42
End: 2022-01-06
Payer: COMMERCIAL

## 2022-01-06 LAB — BACTERIA UR CULT: ABNORMAL

## 2022-01-12 ENCOUNTER — CLINICAL SUPPORT (OUTPATIENT)
Dept: OBSTETRICS AND GYNECOLOGY | Facility: CLINIC | Age: 42
End: 2022-01-12
Payer: COMMERCIAL

## 2022-01-12 DIAGNOSIS — Z23 NEED FOR HPV VACCINATION: Primary | ICD-10-CM

## 2022-01-12 PROCEDURE — 90651 HPV VACCINE 9-VALENT 3 DOSE IM: ICD-10-PCS | Mod: S$GLB,,, | Performed by: OBSTETRICS & GYNECOLOGY

## 2022-01-12 PROCEDURE — 90471 IMMUNIZATION ADMIN: CPT | Mod: S$GLB,,, | Performed by: OBSTETRICS & GYNECOLOGY

## 2022-01-12 PROCEDURE — 90651 9VHPV VACCINE 2/3 DOSE IM: CPT | Mod: S$GLB,,, | Performed by: OBSTETRICS & GYNECOLOGY

## 2022-01-12 PROCEDURE — 90471 HPV VACCINE 9-VALENT 3 DOSE IM: ICD-10-PCS | Mod: S$GLB,,, | Performed by: OBSTETRICS & GYNECOLOGY

## 2022-01-12 NOTE — PROGRESS NOTES
Here for Gardasil # 2 injection, without complaint at this time. Reports no pain before or after injection. Advised to wait in lobby 15 minutes after injection and report any adverse reactions. Return to clinic in 4 months for next injection.     Site: [facility]

## 2022-04-25 ENCOUNTER — CLINICAL SUPPORT (OUTPATIENT)
Dept: OBSTETRICS AND GYNECOLOGY | Facility: CLINIC | Age: 42
End: 2022-04-25
Payer: COMMERCIAL

## 2022-04-25 DIAGNOSIS — Z23 NEED FOR HPV VACCINATION: ICD-10-CM

## 2022-04-25 PROCEDURE — 90471 HPV VACCINE 9-VALENT 3 DOSE IM: ICD-10-PCS | Mod: S$GLB,,, | Performed by: OBSTETRICS & GYNECOLOGY

## 2022-04-25 PROCEDURE — 90651 HPV VACCINE 9-VALENT 3 DOSE IM: ICD-10-PCS | Mod: S$GLB,,, | Performed by: OBSTETRICS & GYNECOLOGY

## 2022-04-25 PROCEDURE — 90471 IMMUNIZATION ADMIN: CPT | Mod: S$GLB,,, | Performed by: OBSTETRICS & GYNECOLOGY

## 2022-04-25 PROCEDURE — 90651 9VHPV VACCINE 2/3 DOSE IM: CPT | Mod: S$GLB,,, | Performed by: OBSTETRICS & GYNECOLOGY

## 2022-04-25 NOTE — PROGRESS NOTES
Here for Gardasil #3  injection, without complaint at this time. Reports no pain before or after injection. Advised to wait in lobby 15 minutes after injection and report any adverse reactions.     Site: LD

## 2022-08-22 ENCOUNTER — HOSPITAL ENCOUNTER (OUTPATIENT)
Dept: RADIOLOGY | Facility: HOSPITAL | Age: 42
Discharge: HOME | End: 2022-08-22
Attending: PHYSICIAN ASSISTANT
Payer: COMMERCIAL

## 2022-08-22 ENCOUNTER — OFFICE VISIT (OUTPATIENT)
Dept: SPORTS MEDICINE | Facility: CLINIC | Age: 42
End: 2022-08-22
Payer: COMMERCIAL

## 2022-08-22 VITALS
WEIGHT: 122 LBS | SYSTOLIC BLOOD PRESSURE: 134 MMHG | HEIGHT: 65 IN | DIASTOLIC BLOOD PRESSURE: 91 MMHG | BODY MASS INDEX: 20.33 KG/M2

## 2022-08-22 DIAGNOSIS — M25.552 CHRONIC HIP PAIN, LEFT: Primary | ICD-10-CM

## 2022-08-22 DIAGNOSIS — M25.552 LEFT HIP PAIN: ICD-10-CM

## 2022-08-22 DIAGNOSIS — G89.29 CHRONIC HIP PAIN, LEFT: Primary | ICD-10-CM

## 2022-08-22 PROCEDURE — 99999 PR PBB SHADOW E&M-EST. PATIENT-LVL IV: ICD-10-PCS | Mod: PBBFAC,,, | Performed by: PHYSICIAN ASSISTANT

## 2022-08-22 PROCEDURE — 3008F PR BODY MASS INDEX (BMI) DOCUMENTED: ICD-10-PCS | Mod: CPTII,S$GLB,, | Performed by: PHYSICIAN ASSISTANT

## 2022-08-22 PROCEDURE — 99214 OFFICE O/P EST MOD 30 MIN: CPT | Mod: S$GLB,,, | Performed by: PHYSICIAN ASSISTANT

## 2022-08-22 PROCEDURE — 73523 X-RAY EXAM HIPS BI 5/> VIEWS: CPT | Mod: TC

## 2022-08-22 PROCEDURE — 1159F MED LIST DOCD IN RCRD: CPT | Mod: CPTII,S$GLB,, | Performed by: PHYSICIAN ASSISTANT

## 2022-08-22 PROCEDURE — 1159F PR MEDICATION LIST DOCUMENTED IN MEDICAL RECORD: ICD-10-PCS | Mod: CPTII,S$GLB,, | Performed by: PHYSICIAN ASSISTANT

## 2022-08-22 PROCEDURE — 3080F PR MOST RECENT DIASTOLIC BLOOD PRESSURE >= 90 MM HG: ICD-10-PCS | Mod: CPTII,S$GLB,, | Performed by: PHYSICIAN ASSISTANT

## 2022-08-22 PROCEDURE — 3075F PR MOST RECENT SYSTOLIC BLOOD PRESS GE 130-139MM HG: ICD-10-PCS | Mod: CPTII,S$GLB,, | Performed by: PHYSICIAN ASSISTANT

## 2022-08-22 PROCEDURE — 99214 PR OFFICE/OUTPT VISIT, EST, LEVL IV, 30-39 MIN: ICD-10-PCS | Mod: S$GLB,,, | Performed by: PHYSICIAN ASSISTANT

## 2022-08-22 PROCEDURE — 73523 XR HIP 5 OR MORE VIEWS BILATERAL: ICD-10-PCS | Mod: 26,,, | Performed by: RADIOLOGY

## 2022-08-22 PROCEDURE — 99999 PR PBB SHADOW E&M-EST. PATIENT-LVL IV: CPT | Mod: PBBFAC,,, | Performed by: PHYSICIAN ASSISTANT

## 2022-08-22 PROCEDURE — 73523 X-RAY EXAM HIPS BI 5/> VIEWS: CPT | Mod: 26,,, | Performed by: RADIOLOGY

## 2022-08-22 PROCEDURE — 3008F BODY MASS INDEX DOCD: CPT | Mod: CPTII,S$GLB,, | Performed by: PHYSICIAN ASSISTANT

## 2022-08-22 PROCEDURE — 3080F DIAST BP >= 90 MM HG: CPT | Mod: CPTII,S$GLB,, | Performed by: PHYSICIAN ASSISTANT

## 2022-08-22 PROCEDURE — 1160F RVW MEDS BY RX/DR IN RCRD: CPT | Mod: CPTII,S$GLB,, | Performed by: PHYSICIAN ASSISTANT

## 2022-08-22 PROCEDURE — 1160F PR REVIEW ALL MEDS BY PRESCRIBER/CLIN PHARMACIST DOCUMENTED: ICD-10-PCS | Mod: CPTII,S$GLB,, | Performed by: PHYSICIAN ASSISTANT

## 2022-08-22 PROCEDURE — 3075F SYST BP GE 130 - 139MM HG: CPT | Mod: CPTII,S$GLB,, | Performed by: PHYSICIAN ASSISTANT

## 2022-08-24 NOTE — PROGRESS NOTES
CC: left hip pain (referral from Dr. [place] Welch to Dr. John)    HPI:   Georgie Wetzel is a pleasant 41 y.o.  who reports to clinic with left hip pain. No trauma, no mech sxs/instabilty.    Reports that her pain began about 1 year ago and went away with workout activity modifications of stopping bounce classes and high knee exercises. Pain has gradually returned and is worsening. Pain is located of the anterior hip area and radiates down her anterior thigh some. No significant pain with activity and it does not limit her. She reports some pain while sleeping and when she first gets up from sleep in the morning. She is limping a little at first. Some workouts exacerbate her pain.    SANE of 95    Today the patient rates pain at a 4/10 on visual analog scale.     Affecting ADLs and exercising    High knee exercises and other workout activities makes pain worse    Review of Systems   Constitution: Negative. Negative for chills, fever and night sweats.   HENT: Negative for congestion and headaches.    Eyes: Negative for blurred vision, left vision loss and right vision loss.   Cardiovascular: Negative for chest pain and syncope.   Respiratory: Negative for cough and shortness of breath.    Endocrine: Negative for polydipsia, polyphagia and polyuria.   Hematologic/Lymphatic: Negative for bleeding problem. Does not bruise/bleed easily.   Skin: Negative for dry skin, itching and rash.   Musculoskeletal: Negative for falls and muscle weakness.   Gastrointestinal: Negative for abdominal pain and bowel incontinence.   Genitourinary: Negative for bladder incontinence and nocturia.   Neurological: Negative for disturbances in coordination, loss of balance and seizures.   Psychiatric/Behavioral: Negative for depression. The patient does not have insomnia.    Allergic/Immunologic: Negative for hives and persistent infections.   All other systems negative.    PAST MEDICAL HISTORY:   Past Medical History:   Diagnosis  Date    Acne     Depression     treated with medication last use 2010, situational     GERD (gastroesophageal reflux disease)     High cholesterol     Retinal detachment      PAST SURGICAL HISTORY:   Past Surgical History:   Procedure Laterality Date     Retinal Tear OS  2005      COLONOSCOPY N/A 8/25/2017    Procedure: COLONOSCOPY;  Surgeon: Vahid Lynn MD;  Location: [facility] ([address] [place] [address]);  Service: Endoscopy;  Laterality: N/A;  after Aug 13 for hx of diverticulitis    CRYOTHERAPY      RETINAL DETACHMENT SURGERY      RPK  OU Dr [facility]    7/2008    TONSILLECTOMY, ADENOIDECTOMY       FAMILY HISTORY:   Family History   Problem Relation Age of Onset    Acne Brother     Ovarian cancer Mother 48    Diabetes Maternal Grandmother     Glaucoma Maternal Grandfather     Cataracts Maternal Grandfather     Stroke Paternal Grandmother     Scleroderma Paternal Grandmother     Atrial fibrillation Father     Hyperlipidemia Father     Melanoma Neg Hx     Breast cancer Neg Hx      SOCIAL HISTORY:   Social History     Socioeconomic History    Marital status: Single   Occupational History    Occupation:    Tobacco Use    Smoking status: Never Smoker    Smokeless tobacco: Never Used   Substance and Sexual Activity    Alcohol use: Yes     Comment: Social use of alcohol monthly     Drug use: No    Sexual activity: Yes     Partners: Male     Birth control/protection: None, OCP   Other Topics Concern    Are you pregnant or think you may be? No    Breast-feeding No       MEDICATIONS:   Current Outpatient Medications:     bimatoprost (LATISSE) 0.03 % ophthalmic solution, USE AS DIRECTED, Disp: , Rfl: 1    cholecalciferol, vitamin D3, 325 mcg (13,000 unit) Cap, Take by mouth., Disp: , Rfl:     clotrimazole-betamethasone 1-0.05% (LOTRISONE) cream, Apply to affected area 2 times daily, Disp: 45 g, Rfl: 3    co-enzyme Q-10 50 mg capsule, Take 50 mg by mouth once daily., Disp: , Rfl:     LACTOBACILLUS  "ACIDOPHILUS (ACIDOPHILUS ORAL), Take by mouth., Disp: , Rfl:     LORazepam (ATIVAN) 1 MG tablet, Take 1 mg by mouth daily as needed., Disp: , Rfl:     MAGNESIUM ORAL, Take 500 mg by mouth once. , Disp: , Rfl:     multivitamin (THERAGRAN) per tablet, Take 1 tablet by mouth once daily., Disp: , Rfl:     naproxen (NAPROSYN) 500 MG tablet, Take 1 tablet (500 mg total) by mouth 2 (two) times daily with meals., Disp: 60 tablet, Rfl: 2    norethindrone-ethinyl estradiol (MICROGESTIN 1/20) 1-20 mg-mcg per tablet, TAKE 1 TABLET BY MOUTH EVERY DAY, Disp: 63 tablet, Rfl: 0    ondansetron (ZOFRAN-ODT) 4 MG TbDL, DISSOLVE 1 TABLET IN MOUTH EVERY 6 HOURS AS NEEDED FOR NAUSEA., Disp: , Rfl: 3    riboflavin, vitamin B2, 400 mg Tab, Take 1 capsule by mouth once daily., Disp: , Rfl:     rizatriptan (MAXALT-MLT) 5 MG disintegrating tablet, Dissolve 1 tab on tongue at onset of migraine, may repeat in 2 hours if needed. Max 3 tabs/day. Max 3 days/week., Disp: 12 tablet, Rfl: 5    spironolactone (ALDACTONE) 100 MG tablet, Take 100 mg by mouth once daily. , Disp: , Rfl:     tiZANidine (ZANAFLEX) 2 MG tablet, TAKE 1 TO 2 TABLETS BY MOUTH AT BEDTIME. NO DRIVING WHILE ON THIS MED. Will need appt for further refills., Disp: 60 tablet, Rfl: 4    traZODone (DESYREL) 50 MG tablet, TAKE 1 OR 2 TABLETS BY MOUTH AT BEDTIME AS NEEDED., Disp: , Rfl:     ZIANA gel, APPLY A THIN FILM TO FACE AT BEDTIME, Disp: , Rfl: 6  ALLERGIES:   Review of patient's allergies indicates:   Allergen Reactions    No known drug allergies        VITAL SIGNS: BP (!) 134/91   Ht 5' 5" (1.651 m)   Wt 55.3 kg (122 lb)   BMI 20.30 kg/m²        PHYSICAL EXAM /  HIP  PHYSICAL EXAMINATION  General:  The patient is alert and oriented x 3.  Mood is pleasant.  Observation of ears, eyes and nose reveal no gross abnormalities.  HEENT: NCAT, sclera nonicteric  Lungs: Respirations are equal and unlabored..    left HIP EXAMINATION     OBSERVATION / " INSPECTION  Gait:   Nonantalgic   Alignment:  Neutral   Scars:   None   Muscle atrophy: None   Effusion:  None   Warmth:  None   Discoloration:   None   Leg lengths:   Equal   Pelvis:   Level     TENDERNESS / CREPITUS (T/C):      T / C  Trochanteric bursa   - / -  Piriformis    - / -  SI joint    - / -  Psoas tendon   - / -  Rectus insertion  - / -  Adductor insertion  - / -  Pubic symphysis  - / -  IT band                                   - / -  Gluteus tendons                     - / -    ROM: (* = pain)    Flexion:    130 degrees  External rotation: 50 degrees  Internal rotation with axial load: 35 degrees  Internal rotation without axial load: 45 degrees  Abduction:  45 degrees  Adduction:   20 degrees    SPECIAL TESTS:  Pain w/ forced internal rotation (FADIR): -   Pain w/ forced external rotation (HEMALATHA): Absent   Circumduction test:    -  Stinchfield test:    Negative   Log roll:      Negative   Snapping hip (internal):   Negative   Sit-up pain:     Negative   Resisted sit-up pain:    Negative   Resisted sit-up with adductor contraction pain:  Negative   Step-down test:    +  Trendelenburg test:    Negative  Bridge test     neg    EXTREMITY NEURO-VASCULAR EXAMINATION:   Sensation:  Grossly intact to light touch all dermatomal regions.   Motor Function:  Fully intact motor function at hip, knee, foot and ankle    DTRs;  quadriceps and  achilles 2+.  No clonus and downgoing Babinski.    Vascular status:  DP and PT pulses 2+, brisk capillary refill, symmetric.    Skin:  intact, compartments soft.    OTHER FINDINGS:      XRAYS:  3 Pelvis views and 2 hip views were independently reviewed and interpreted by myself.  No fracture, subluxation, or other joint abnormality is identified.       ASSESSMENT:    1. left hip pain, acute on chronic  2, Hip flexor tightness   hip abd weakness    PLAN:  1. PT for left hip flexor pain and tightness with exercise. eval and treat with HEP  2. NSAIDS prn  3. Activity and  workout modifications discussed.   4. RTC in 6-8 weeks if not seeing decent pain improvement of if things worsen or new symptoms occur.     All questions were answered, pt will contact us for questions or concerns in the interim.    I made the decision to obtain old records of the patient including previous notes and imaging. New imaging was ordered today of the extremity or extremities evaluated. I independently reviewed and interpreted the radiographs and/or MRIs today as well as prior imaging.

## 2022-09-14 ENCOUNTER — TELEPHONE (OUTPATIENT)
Dept: OBSTETRICS AND GYNECOLOGY | Facility: CLINIC | Age: 42
End: 2022-09-14
Payer: COMMERCIAL

## 2022-09-14 DIAGNOSIS — Z12.31 SCREENING MAMMOGRAM FOR BREAST CANCER: Primary | ICD-10-CM

## 2022-09-14 NOTE — TELEPHONE ENCOUNTER
----- Message from Keila Duque sent at 9/14/2022  9:00 AM CDT -----  Patient is requesting orders for her mammogram.    Please contact patient to discuss date and time, you can send message to me and I will be happy to schedule appt for you.    Thanks

## 2022-10-11 NOTE — TELEPHONE ENCOUNTER
Reason for Call:  Other [facility] Paperwork    Detailed comments: Follow-up //  FMLA - Sent paperwork and requesting it be filled out and sent back to requesting provider, sent by [facility].    Went to be seen on 10/5/22 - unsure when the paperwork was initially sent.    Phone Number Patient can be reached at: Other phone number:  [phone]    Best Time: Anytime    Can we leave a detailed message on this number? YES    Call taken on 10/11/2022 at 9:57 AM by Santos Wallis     Spoke with pt R/S appt to 03/06/2020 @12:30.

## 2022-10-19 ENCOUNTER — OFFICE VISIT (OUTPATIENT)
Dept: OBSTETRICS AND GYNECOLOGY | Facility: CLINIC | Age: 42
End: 2022-10-19
Payer: COMMERCIAL

## 2022-10-19 VITALS
SYSTOLIC BLOOD PRESSURE: 128 MMHG | BODY MASS INDEX: 20.97 KG/M2 | WEIGHT: 125.88 LBS | HEIGHT: 65 IN | DIASTOLIC BLOOD PRESSURE: 72 MMHG

## 2022-10-19 DIAGNOSIS — Z30.41 ENCOUNTER FOR SURVEILLANCE OF CONTRACEPTIVE PILLS: ICD-10-CM

## 2022-10-19 DIAGNOSIS — Z12.31 VISIT FOR SCREENING MAMMOGRAM: ICD-10-CM

## 2022-10-19 DIAGNOSIS — Z12.4 PAP SMEAR FOR CERVICAL CANCER SCREENING: ICD-10-CM

## 2022-10-19 DIAGNOSIS — Z01.419 WOMEN'S ANNUAL ROUTINE GYNECOLOGICAL EXAMINATION: Primary | ICD-10-CM

## 2022-10-19 DIAGNOSIS — N95.1 PERIMENOPAUSAL SYMPTOMS: ICD-10-CM

## 2022-10-19 PROCEDURE — 1159F PR MEDICATION LIST DOCUMENTED IN MEDICAL RECORD: ICD-10-PCS | Mod: CPTII,S$GLB,, | Performed by: OBSTETRICS & GYNECOLOGY

## 2022-10-19 PROCEDURE — 87624 HPV HI-RISK TYP POOLED RSLT: CPT | Performed by: OBSTETRICS & GYNECOLOGY

## 2022-10-19 PROCEDURE — 3074F PR MOST RECENT SYSTOLIC BLOOD PRESSURE < 130 MM HG: ICD-10-PCS | Mod: CPTII,S$GLB,, | Performed by: OBSTETRICS & GYNECOLOGY

## 2022-10-19 PROCEDURE — 3078F DIAST BP <80 MM HG: CPT | Mod: CPTII,S$GLB,, | Performed by: OBSTETRICS & GYNECOLOGY

## 2022-10-19 PROCEDURE — 1160F RVW MEDS BY RX/DR IN RCRD: CPT | Mod: CPTII,S$GLB,, | Performed by: OBSTETRICS & GYNECOLOGY

## 2022-10-19 PROCEDURE — 99999 PR PBB SHADOW E&M-EST. PATIENT-LVL IV: CPT | Mod: PBBFAC,,, | Performed by: OBSTETRICS & GYNECOLOGY

## 2022-10-19 PROCEDURE — 1160F PR REVIEW ALL MEDS BY PRESCRIBER/CLIN PHARMACIST DOCUMENTED: ICD-10-PCS | Mod: CPTII,S$GLB,, | Performed by: OBSTETRICS & GYNECOLOGY

## 2022-10-19 PROCEDURE — 3078F PR MOST RECENT DIASTOLIC BLOOD PRESSURE < 80 MM HG: ICD-10-PCS | Mod: CPTII,S$GLB,, | Performed by: OBSTETRICS & GYNECOLOGY

## 2022-10-19 PROCEDURE — 99396 PREV VISIT EST AGE 40-64: CPT | Mod: S$GLB,,, | Performed by: OBSTETRICS & GYNECOLOGY

## 2022-10-19 PROCEDURE — 99999 PR PBB SHADOW E&M-EST. PATIENT-LVL IV: ICD-10-PCS | Mod: PBBFAC,,, | Performed by: OBSTETRICS & GYNECOLOGY

## 2022-10-19 PROCEDURE — 88175 CYTOPATH C/V AUTO FLUID REDO: CPT | Performed by: OBSTETRICS & GYNECOLOGY

## 2022-10-19 PROCEDURE — 99396 PR PREVENTIVE VISIT,EST,40-64: ICD-10-PCS | Mod: S$GLB,,, | Performed by: OBSTETRICS & GYNECOLOGY

## 2022-10-19 PROCEDURE — 1159F MED LIST DOCD IN RCRD: CPT | Mod: CPTII,S$GLB,, | Performed by: OBSTETRICS & GYNECOLOGY

## 2022-10-19 PROCEDURE — 3074F SYST BP LT 130 MM HG: CPT | Mod: CPTII,S$GLB,, | Performed by: OBSTETRICS & GYNECOLOGY

## 2022-10-19 RX ORDER — NITROFURANTOIN 25; 75 MG/1; MG/1
100 CAPSULE ORAL 2 TIMES DAILY
COMMUNITY
Start: 2022-08-23 | End: 2023-09-22

## 2022-10-19 RX ORDER — NORETHINDRONE ACETATE AND ETHINYL ESTRADIOL .02; 1 MG/1; MG/1
1 TABLET ORAL DAILY
Qty: 63 TABLET | Refills: 12 | Status: SHIPPED | OUTPATIENT
Start: 2022-10-19 | End: 2023-10-31 | Stop reason: SDUPTHER

## 2022-10-19 NOTE — PROGRESS NOTES
"Georgie Wetzel is a 42 y.o. female  who presents for annual exam.  She continues on Microgestin  OCPs with essentially no bleeding.  Over the past six months, she has noted an increase in night sweats.  She has a history of other high risk HPV noted on her last pap which had normal cytology.  Denies recent changes in her medical / surgical history.  No GYN complaints.  No LMP recorded. (Menstrual status: Birth Control).    Blood pressure 128/72, height 5' 5" (1.651 m), weight 57.1 kg (125 lb 14.1 oz).    Summary:  3/10/20 Pap: Negative, HPV: Negative  21 Pap: Negative, other HR HPV present    Past Medical History:   Diagnosis Date    Acne     Depression     treated with medication last use , situational     GERD (gastroesophageal reflux disease)     High cholesterol     Retinal detachment        Past Surgical History:   Procedure Laterality Date     Retinal Tear OS        COLONOSCOPY N/A 2017    Procedure: COLONOSCOPY;  Surgeon: Vahid Lynn MD;  Location: [address]);  Service: Endoscopy;  Laterality: N/A;  after Aug 13 for hx of diverticulitis    CRYOTHERAPY      RETINAL DETACHMENT SURGERY      RPK  OU Dr [facility]oz    2008    TONSILLECTOMY, ADENOIDECTOMY         OB History          0    Para   0    Term   0       0    AB   0    Living   0         SAB   0    IAB   0    Ectopic   0    Multiple   0    Live Births                     ROS:  GENERAL: Reports increased frequency of night sweats.   SKIN: Denies rash or lesions.   HEAD: Denies head injury or headache.   NODES: Denies enlarged lymph nodes.   CHEST: Denies chest pain or shortness of breath.   CARDIOVASCULAR: Denies palpitations or left sided chest pain.   ABDOMEN: No abdominal pain, nausea, vomiting or rectal bleeding.   URINARY: No dysuria or hematuria.  REPRODUCTIVE: See HPI.   BREASTS: Denies pain, lumps, or nipple discharge.   HEMATOLOGIC: No easy bruisability or excessive bleeding.   MUSCULOSKELETAL: " Denies joint pain or swelling.   NEUROLOGIC: Denies syncope or weakness.   PSYCHIATRIC: Denies depression.    PE:   (chaperone present during entire exam)  APPEARANCE: Well nourished, well developed, in no acute distress.  BREASTS: Symmetrical, no skin changes or visible lesions. No palpable masses, nipple discharge or adenopathy bilaterally.  ABDOMEN: Soft. No tenderness or masses. No CVA tenderness.  VULVA: No lesions. Normal female genitalia.  URETHRAL MEATUS: Normal size and location, no lesions, no prolapse.  URETHRA: No masses, tenderness, prolapse or scarring.  VAGINA: No lesions, no abnormal discharge, no significant cystocele or rectocele.  CERVIX: No lesions and discharge. PAP done.  UTERUS: Normal size, regular shape, mobile, non-tender, bladder base nontender.  ADNEXA: No masses, tenderness or CDS nodularity.  ANUS PERINEUM: Normal.      Diagnosis:  1. Women's annual routine gynecological examination    2. Encounter for surveillance of contraceptive pills    3. Perimenopausal symptoms    4. Pap smear for cervical cancer screening    5. Visit for screening mammogram          PLAN:    Orders Placed This Encounter    HPV High Risk Genotypes, PCR    Follicle Stimulating Hormone    Luteinizing Hormone    TSH    Liquid-Based Pap Smear, Screening    norethindrone-ethinyl estradiol (MICROGESTIN 1/20) 1-20 mg-mcg per tablet       Patient was counseled today on the need for annual gyn exams.  We discussed her usage of OCPs with which she is doing well and desires to continue.  With the development of hot flashes, she will have hormone levels drawn at the end of her hormone free week on OCPs to determine her menopausal status.  We also reviewed her pap history with other high risk HPV noted on prior pap.    To call us to schedule labs.  Follow-up in 1 year.

## 2022-10-26 LAB
FINAL PATHOLOGIC DIAGNOSIS: NORMAL
HPV HR 12 DNA SPEC QL NAA+PROBE: POSITIVE
HPV16 AG SPEC QL: NEGATIVE
HPV18 DNA SPEC QL NAA+PROBE: NEGATIVE
Lab: NORMAL

## 2022-10-27 ENCOUNTER — TELEPHONE (OUTPATIENT)
Dept: OBSTETRICS AND GYNECOLOGY | Facility: CLINIC | Age: 42
End: 2022-10-27
Payer: COMMERCIAL

## 2022-10-27 NOTE — TELEPHONE ENCOUNTER
Called pt to schedule colpo, we agreed on date and time. Gave pt directions to office. Pt verbalized understanding.

## 2022-10-27 NOTE — TELEPHONE ENCOUNTER
Called patient:    Discussed results of pap:  Cytology negative, other HR HPV present.    Unchanged from prior pap.    REC:  Colpo    Summary:  3/10/20 Pap: Negative, HPV: Negative  6/22/21 Pap: Negative, other HR HPV present

## 2022-11-02 ENCOUNTER — HOSPITAL ENCOUNTER (OUTPATIENT)
Dept: RADIOLOGY | Facility: OTHER | Age: 42
Discharge: HOME | End: 2022-11-02
Attending: OBSTETRICS & GYNECOLOGY
Payer: COMMERCIAL

## 2022-11-02 DIAGNOSIS — Z12.31 SCREENING MAMMOGRAM FOR BREAST CANCER: ICD-10-CM

## 2022-11-02 PROCEDURE — 77067 SCR MAMMO BI INCL CAD: CPT | Mod: TC

## 2022-11-02 PROCEDURE — 77063 MAMMO DIGITAL SCREENING BILAT WITH TOMO: ICD-10-PCS | Mod: 26,,, | Performed by: RADIOLOGY

## 2022-11-02 PROCEDURE — 77067 SCR MAMMO BI INCL CAD: CPT | Mod: 26,,, | Performed by: RADIOLOGY

## 2022-11-02 PROCEDURE — 77063 BREAST TOMOSYNTHESIS BI: CPT | Mod: 26,,, | Performed by: RADIOLOGY

## 2022-11-02 PROCEDURE — 77067 MAMMO DIGITAL SCREENING BILAT WITH TOMO: ICD-10-PCS | Mod: 26,,, | Performed by: RADIOLOGY

## 2022-11-02 PROCEDURE — 77063 BREAST TOMOSYNTHESIS BI: CPT | Mod: TC

## 2022-11-07 ENCOUNTER — PATIENT MESSAGE (OUTPATIENT)
Dept: OBSTETRICS AND GYNECOLOGY | Facility: CLINIC | Age: 42
End: 2022-11-07
Payer: COMMERCIAL

## 2022-11-17 ENCOUNTER — PROCEDURE VISIT (OUTPATIENT)
Dept: OBSTETRICS AND GYNECOLOGY | Facility: CLINIC | Age: 42
End: 2022-11-17
Attending: OBSTETRICS & GYNECOLOGY
Payer: COMMERCIAL

## 2022-11-17 VITALS
HEIGHT: 65 IN | DIASTOLIC BLOOD PRESSURE: 72 MMHG | WEIGHT: 124.56 LBS | BODY MASS INDEX: 20.75 KG/M2 | SYSTOLIC BLOOD PRESSURE: 120 MMHG

## 2022-11-17 DIAGNOSIS — R87.810 PAP SMEAR OF CERVIX SHOWS HIGH RISK HPV PRESENT: Primary | ICD-10-CM

## 2022-11-17 PROCEDURE — 81025 URINE PREGNANCY TEST: CPT | Mod: S$GLB,,, | Performed by: OBSTETRICS & GYNECOLOGY

## 2022-11-17 PROCEDURE — 88305 TISSUE EXAM BY PATHOLOGIST: ICD-10-PCS | Mod: 26,,, | Performed by: PATHOLOGY

## 2022-11-17 PROCEDURE — 57454 PR COLPOSC,CERVIX W/ADJ VAG,W/BX & CURRETAG: ICD-10-PCS | Mod: S$GLB,,, | Performed by: OBSTETRICS & GYNECOLOGY

## 2022-11-17 PROCEDURE — 81025 PR  URINE PREGNANCY TEST: ICD-10-PCS | Mod: S$GLB,,, | Performed by: OBSTETRICS & GYNECOLOGY

## 2022-11-17 PROCEDURE — 88305 TISSUE EXAM BY PATHOLOGIST: CPT | Mod: 26,,, | Performed by: PATHOLOGY

## 2022-11-17 PROCEDURE — 57454 BX/CURETT OF CERVIX W/SCOPE: CPT | Mod: S$GLB,,, | Performed by: OBSTETRICS & GYNECOLOGY

## 2022-11-17 PROCEDURE — 88305 TISSUE EXAM BY PATHOLOGIST: CPT | Mod: 59 | Performed by: PATHOLOGY

## 2022-11-17 NOTE — PROCEDURES
COLPOSCOPY:    Georgie Wetzel is a 42 y.o. female who presents for a colposcopy secondary to persistent other HR HPV with normal cytology on subsequent paps.      UPT is negative.    Summary:  3/10/20 Pap: Negative, HPV: Negative  6/22/21 Pap: Negative, other HR HPV present  10/19/22 Pap: Negative, other HR HPV present        PRE-COLPOSCOPY PROCEDURE COUNSELING:  Discussed the abnormal pap test findings, HPV, need for colposcopy and possible biopsies to determine a diagnosis and plan of care, treatments available, the minimal risks of bleeding and infection with a colposcopy, alternatives to colposcopy and she agrees to proceed.  Patient was given the opportunity to ask questions and written consent was obtained.        COLPOSCOPY EXAM:   TIME OUT PERFORMED.   Cervix visualized with speculum  Acetic acid applied to cervix  Cervix mildly stenotic, dilated with blue os finder  Entire transformation zone seen  White epithelium noted at 12:00  Biopsy was taken at 12:00  ECC performed.    Hemostatic with silver nitrate.  Minimal blood loss.      The speculum was removed.   The patient tolerated the procedure well.    There were no complications.      IMPRESSION:   Pap with normal cytology, persistent other HR HPV on subsequent paps    POST COLPOSCOPY COUNSELING:   Manage post colposcopy cramping with NSAIDs or Tylenol.  Avoid anything in vagina (intercourse, douching, tampons) one week after the procedure.  Report bleeding heavier than a period, worsening pain, fever > 101.0 F, or foul-smelling vaginal discharge.  Importance of follow-up stressed.      FOLLOW-UP:   We will contact her with biopsy results and to discuss treatment plan.

## 2022-11-28 ENCOUNTER — TELEPHONE (OUTPATIENT)
Dept: OBSTETRICS AND GYNECOLOGY | Facility: CLINIC | Age: 42
End: 2022-11-28
Payer: COMMERCIAL

## 2022-11-28 LAB
FINAL PATHOLOGIC DIAGNOSIS: NORMAL
GROSS: NORMAL
Lab: NORMAL

## 2022-11-28 NOTE — TELEPHONE ENCOUNTER
Called patient:    Discussed results of colpo:    1. ENDOCERVICAL CURETTAGE SHOWS SMALL FRAGMENTS OF MUCOID MATERIAL,   INSUFFICIENT FOR DIAGNOSIS.   2. CERVICAL BIOPSY AT 12 O'CLOCK SHOWS MILD SQUAMOUS DYSPLASIA (CIN1) AND   SQUAMOUS METAPLASIA.    REC:  Repeat pap in one year - I emphasized the importance of follow-up

## 2023-05-29 ENCOUNTER — PATIENT MESSAGE (OUTPATIENT)
Dept: INFECTIOUS DISEASES | Facility: CLINIC | Age: 43
End: 2023-05-29
Payer: COMMERCIAL

## 2023-09-22 ENCOUNTER — PATIENT MESSAGE (OUTPATIENT)
Dept: OBSTETRICS AND GYNECOLOGY | Facility: CLINIC | Age: 43
End: 2023-09-22
Payer: COMMERCIAL

## 2023-09-22 ENCOUNTER — OFFICE VISIT (OUTPATIENT)
Dept: URGENT CARE | Facility: CLINIC | Age: 43
End: 2023-09-22
Payer: COMMERCIAL

## 2023-09-22 VITALS
BODY MASS INDEX: 20.66 KG/M2 | TEMPERATURE: 98 F | WEIGHT: 124 LBS | HEART RATE: 88 BPM | OXYGEN SATURATION: 99 % | RESPIRATION RATE: 18 BRPM | SYSTOLIC BLOOD PRESSURE: 129 MMHG | DIASTOLIC BLOOD PRESSURE: 75 MMHG | HEIGHT: 65 IN

## 2023-09-22 DIAGNOSIS — R30.0 DYSURIA: ICD-10-CM

## 2023-09-22 DIAGNOSIS — N30.00 ACUTE CYSTITIS WITHOUT HEMATURIA: Primary | ICD-10-CM

## 2023-09-22 LAB
B-HCG UR QL: NEGATIVE
BILIRUB UR QL STRIP: NEGATIVE
CTP QC/QA: YES
GLUCOSE UR QL STRIP: NEGATIVE
KETONES UR QL STRIP: NEGATIVE
LEUKOCYTE ESTERASE UR QL STRIP: NEGATIVE
PH, POC UA: 7 (ref 5–8)
POC BLOOD, URINE: NEGATIVE
POC NITRATES, URINE: NEGATIVE
PROT UR QL STRIP: NEGATIVE
SP GR UR STRIP: 1 (ref 1–1.03)
UROBILINOGEN UR STRIP-ACNC: NORMAL (ref 0.1–1.1)

## 2023-09-22 PROCEDURE — 99213 PR OFFICE/OUTPT VISIT, EST, LEVL III, 20-29 MIN: ICD-10-PCS | Mod: S$GLB,,, | Performed by: NURSE PRACTITIONER

## 2023-09-22 PROCEDURE — 87086 URINE CULTURE/COLONY COUNT: CPT | Performed by: NURSE PRACTITIONER

## 2023-09-22 PROCEDURE — 81025 URINE PREGNANCY TEST: CPT | Mod: S$GLB,,, | Performed by: NURSE PRACTITIONER

## 2023-09-22 PROCEDURE — 81003 URINALYSIS AUTO W/O SCOPE: CPT | Mod: QW,S$GLB,, | Performed by: NURSE PRACTITIONER

## 2023-09-22 PROCEDURE — 81025 POCT URINE PREGNANCY: ICD-10-PCS | Mod: S$GLB,,, | Performed by: NURSE PRACTITIONER

## 2023-09-22 PROCEDURE — 81003 POCT URINALYSIS, DIPSTICK, AUTOMATED, W/O SCOPE: ICD-10-PCS | Mod: QW,S$GLB,, | Performed by: NURSE PRACTITIONER

## 2023-09-22 PROCEDURE — 99213 OFFICE O/P EST LOW 20 MIN: CPT | Mod: S$GLB,,, | Performed by: NURSE PRACTITIONER

## 2023-09-22 RX ORDER — NITROFURANTOIN 25; 75 MG/1; MG/1
100 CAPSULE ORAL 2 TIMES DAILY
Qty: 14 CAPSULE | Refills: 0 | Status: SHIPPED | OUTPATIENT
Start: 2023-09-22 | End: 2023-09-29

## 2023-09-22 NOTE — PROGRESS NOTES
"Subjective:      Patient ID: Georgie Wetzel is a 42 y.o. female.    Vitals:  height is 5' 5" (1.651 m) and weight is 56.2 kg (124 lb). Her temperature is 97.9 °F (36.6 °C). Her blood pressure is 129/75 and her pulse is 88. Her respiration is 18 and oxygen saturation is 99%.     Chief Complaint: Dysuria      Patient reports "I have the beginnings of a UTI I need macrobid."   Provider note begins below    Patient noticed bladder spasm after having intercourse last night.  Denies any concern for STDs.  No discharge.  No foul odor.  She is not had a UTI in a year.  She has been taking azo.    Dysuria   This is a new problem. The current episode started today. The problem has been unchanged. The quality of the pain is described as burning. The pain is at a severity of 3/10. The pain is mild. There has been no fever. She is Sexually active. There is No history of pyelonephritis. Associated symptoms include frequency and urgency. Pertinent negatives include no flank pain, nausea, vomiting or constipation. She has tried nothing for the symptoms. The treatment provided no relief.       Constitution: Negative for sweating, fatigue and fever.   Cardiovascular:  Negative for chest pain and sob on exertion.   Respiratory:  Negative for cough and shortness of breath.    Gastrointestinal:  Negative for nausea, vomiting, constipation and diarrhea.   Genitourinary:  Positive for dysuria, frequency and urgency. Negative for flank pain, vaginal discharge and vaginal odor.      Objective:     Physical Exam   Constitutional: She is oriented to person, place, and time.   HENT:   Head: Normocephalic and atraumatic.   Cardiovascular: Normal rate.   Pulmonary/Chest: Effort normal. No respiratory distress.   Abdominal: Normal appearance. She exhibits no distension and no mass. Soft. There is no abdominal tenderness. There is no rebound, no guarding, no left CVA tenderness and no right CVA tenderness. No hernia.   Neurological: She is " alert and oriented to person, place, and time.   Skin: Skin is warm and dry.   Psychiatric: Her behavior is normal. Mood normal.       Results for orders placed or performed in visit on 09/22/23   POCT urine pregnancy   Result Value Ref Range    POC Preg Test, Ur Negative Negative     Acceptable Yes    POCT Urinalysis, Dipstick, Automated, W/O Scope   Result Value Ref Range    POC Blood, Urine Negative Negative    POC Bilirubin, Urine Negative Negative    POC Urobilinogen, Urine normal 0.1 - 1.1    POC Ketones, Urine Negative Negative    POC Protein, Urine Negative Negative    POC Nitrates, Urine Negative Negative    POC Glucose, Urine Negative Negative    pH, UA 7.0 5 - 8    POC Specific Gravity, Urine 1.005 1.003 - 1.029    POC Leukocytes, Urine Negative Negative         Assessment:     1. Acute cystitis without hematuria    2. Dysuria        Plan:   UA normal   Will send urine for culture  Macrobid.  Patient already taking azo.  Follow-up if not improving  Increase liquids.  Wipe front to back.  Avoid tub baths.          Acute cystitis without hematuria    Dysuria  -     POCT urine pregnancy  -     POCT Urinalysis, Dipstick, Automated, W/O Scope  -     Urine culture  -     nitrofurantoin, macrocrystal-monohydrate, (MACROBID) 100 MG capsule; Take 1 capsule (100 mg total) by mouth 2 (two) times daily. for 7 days  Dispense: 14 capsule; Refill: 0

## 2023-09-23 LAB — BACTERIA UR CULT: NO GROWTH

## 2023-09-24 ENCOUNTER — TELEPHONE (OUTPATIENT)
Dept: URGENT CARE | Facility: CLINIC | Age: 43
End: 2023-09-24
Payer: COMMERCIAL

## 2023-09-24 NOTE — TELEPHONE ENCOUNTER
Called patient to give negative urine culture results from previous visit 09/22/2023.  Seen by different provider and prescribed Macrobid.  Reports symptoms resolved after taking Macrobid.  Recommend completing antibiotic course to prevent any antibiotic resistance.  Clinic versus ER precautions given.  Answered all questions.  Patient verbalized understanding and agree plan of care.      Results for orders placed or performed in visit on 09/22/23   Urine culture    Specimen: Urine, Clean Catch   Result Value Ref Range    Urine Culture, Routine No growth    POCT urine pregnancy   Result Value Ref Range    POC Preg Test, Ur Negative Negative     Acceptable Yes    POCT Urinalysis, Dipstick, Automated, W/O Scope   Result Value Ref Range    POC Blood, Urine Negative Negative    POC Bilirubin, Urine Negative Negative    POC Urobilinogen, Urine normal 0.1 - 1.1    POC Ketones, Urine Negative Negative    POC Protein, Urine Negative Negative    POC Nitrates, Urine Negative Negative    POC Glucose, Urine Negative Negative    pH, UA 7.0 5 - 8    POC Specific Gravity, Urine 1.005 1.003 - 1.029    POC Leukocytes, Urine Negative Negative

## 2023-10-13 ENCOUNTER — TELEPHONE (OUTPATIENT)
Dept: OBSTETRICS AND GYNECOLOGY | Facility: CLINIC | Age: 43
End: 2023-10-13

## 2023-10-13 ENCOUNTER — OFFICE VISIT (OUTPATIENT)
Dept: OBSTETRICS AND GYNECOLOGY | Facility: CLINIC | Age: 43
End: 2023-10-13
Attending: OBSTETRICS & GYNECOLOGY
Payer: COMMERCIAL

## 2023-10-13 ENCOUNTER — LAB VISIT (OUTPATIENT)
Dept: LAB | Facility: OTHER | Age: 43
End: 2023-10-13
Attending: OBSTETRICS & GYNECOLOGY
Payer: COMMERCIAL

## 2023-10-13 VITALS
HEIGHT: 65 IN | BODY MASS INDEX: 20.68 KG/M2 | SYSTOLIC BLOOD PRESSURE: 142 MMHG | DIASTOLIC BLOOD PRESSURE: 82 MMHG | WEIGHT: 124.13 LBS

## 2023-10-13 DIAGNOSIS — R10.2 PELVIC PAIN: Primary | ICD-10-CM

## 2023-10-13 DIAGNOSIS — N95.1 PERIMENOPAUSAL SYMPTOMS: ICD-10-CM

## 2023-10-13 LAB
FSH SERPL-ACNC: 13.85 MIU/ML
LH SERPL-ACNC: 5.2 MIU/ML
TSH SERPL DL<=0.005 MIU/L-ACNC: 0.46 UIU/ML (ref 0.4–4)

## 2023-10-13 PROCEDURE — 3077F SYST BP >= 140 MM HG: CPT | Mod: CPTII,S$GLB,, | Performed by: OBSTETRICS & GYNECOLOGY

## 2023-10-13 PROCEDURE — 3008F BODY MASS INDEX DOCD: CPT | Mod: CPTII,S$GLB,, | Performed by: OBSTETRICS & GYNECOLOGY

## 2023-10-13 PROCEDURE — 3077F PR MOST RECENT SYSTOLIC BLOOD PRESSURE >= 140 MM HG: ICD-10-PCS | Mod: CPTII,S$GLB,, | Performed by: OBSTETRICS & GYNECOLOGY

## 2023-10-13 PROCEDURE — 99214 PR OFFICE/OUTPT VISIT, EST, LEVL IV, 30-39 MIN: ICD-10-PCS | Mod: S$GLB,,, | Performed by: OBSTETRICS & GYNECOLOGY

## 2023-10-13 PROCEDURE — 87491 CHLMYD TRACH DNA AMP PROBE: CPT | Performed by: OBSTETRICS & GYNECOLOGY

## 2023-10-13 PROCEDURE — 99999 PR PBB SHADOW E&M-EST. PATIENT-LVL III: ICD-10-PCS | Mod: PBBFAC,,, | Performed by: OBSTETRICS & GYNECOLOGY

## 2023-10-13 PROCEDURE — 83002 ASSAY OF GONADOTROPIN (LH): CPT | Performed by: OBSTETRICS & GYNECOLOGY

## 2023-10-13 PROCEDURE — 87591 N.GONORRHOEAE DNA AMP PROB: CPT | Performed by: OBSTETRICS & GYNECOLOGY

## 2023-10-13 PROCEDURE — 3008F PR BODY MASS INDEX (BMI) DOCUMENTED: ICD-10-PCS | Mod: CPTII,S$GLB,, | Performed by: OBSTETRICS & GYNECOLOGY

## 2023-10-13 PROCEDURE — 99999 PR PBB SHADOW E&M-EST. PATIENT-LVL III: CPT | Mod: PBBFAC,,, | Performed by: OBSTETRICS & GYNECOLOGY

## 2023-10-13 PROCEDURE — 3079F PR MOST RECENT DIASTOLIC BLOOD PRESSURE 80-89 MM HG: ICD-10-PCS | Mod: CPTII,S$GLB,, | Performed by: OBSTETRICS & GYNECOLOGY

## 2023-10-13 PROCEDURE — 3079F DIAST BP 80-89 MM HG: CPT | Mod: CPTII,S$GLB,, | Performed by: OBSTETRICS & GYNECOLOGY

## 2023-10-13 PROCEDURE — 1160F RVW MEDS BY RX/DR IN RCRD: CPT | Mod: CPTII,S$GLB,, | Performed by: OBSTETRICS & GYNECOLOGY

## 2023-10-13 PROCEDURE — 99214 OFFICE O/P EST MOD 30 MIN: CPT | Mod: S$GLB,,, | Performed by: OBSTETRICS & GYNECOLOGY

## 2023-10-13 PROCEDURE — 84443 ASSAY THYROID STIM HORMONE: CPT | Performed by: OBSTETRICS & GYNECOLOGY

## 2023-10-13 PROCEDURE — 1159F PR MEDICATION LIST DOCUMENTED IN MEDICAL RECORD: ICD-10-PCS | Mod: CPTII,S$GLB,, | Performed by: OBSTETRICS & GYNECOLOGY

## 2023-10-13 PROCEDURE — 1160F PR REVIEW ALL MEDS BY PRESCRIBER/CLIN PHARMACIST DOCUMENTED: ICD-10-PCS | Mod: CPTII,S$GLB,, | Performed by: OBSTETRICS & GYNECOLOGY

## 2023-10-13 PROCEDURE — 87086 URINE CULTURE/COLONY COUNT: CPT | Performed by: OBSTETRICS & GYNECOLOGY

## 2023-10-13 PROCEDURE — 36415 COLL VENOUS BLD VENIPUNCTURE: CPT | Performed by: OBSTETRICS & GYNECOLOGY

## 2023-10-13 PROCEDURE — 1159F MED LIST DOCD IN RCRD: CPT | Mod: CPTII,S$GLB,, | Performed by: OBSTETRICS & GYNECOLOGY

## 2023-10-13 PROCEDURE — 83001 ASSAY OF GONADOTROPIN (FSH): CPT | Performed by: OBSTETRICS & GYNECOLOGY

## 2023-10-13 RX ORDER — MINOXIDIL 2.5 MG/1
2.5 TABLET ORAL
COMMUNITY
Start: 2023-10-05

## 2023-10-13 NOTE — TELEPHONE ENCOUNTER
Called patient:    Discussed results of hormone levels:    FSH 13.85,  LH 5.2,  TSH .455    Plans to have pelvic ultrasound tomorrow.

## 2023-10-13 NOTE — PROGRESS NOTES
"Chief Complaint   Patient presents with    Painful [place]    Pelvic Pain       HPI:  Georgie Wetzel is a 43 y.o. female patient  who presents today for evaluation of pelvic discomfort as well as changes in her menses.  She is currently on Microgestin  OCPs and has essentially no bleeding.  Over the past several weeks, she has noted pelvic "squeezing / cramping" as well as midline pain with IC.  During the most recent hormone free interval of her OCPs, she had significant cramping as well as bright red bleeding yesterday.  Today, she notes old dark blood and cramping has resolved.  She has a prior history of ovarian cysts.  No fever.  Reports experiencing hot flashes / sweats for many years.  Denies bowel / bladder issues.       Patient's last menstrual period was 10/12/2023 (exact date).    Blood pressure (!) 142/82, height 5' 5" (1.651 m), weight 56.3 kg (124 lb 1.9 oz), last menstrual period 10/12/2023.    UPT today: Negative.    Summary:  3/10/20 Pap: Negative, HPV: Negative  21 Pap: Negative, other HR HPV present  10/19/22 Pap: Negative, other HR HPV present  22 Colpo: Ecto HARSHA I, Ecc negative      Past Medical History:   Diagnosis Date    Abnormal Pap smear of cervix     Acne     Depression     treated with medication last use , situational     GERD (gastroesophageal reflux disease)     High cholesterol     Retinal detachment        Past Surgical History:   Procedure Laterality Date     Retinal Tear OS        COLONOSCOPY N/A 2017    Procedure: COLONOSCOPY;  Surgeon: Vahid Lynn MD;  Location: [address];  Service: Endoscopy;  Laterality: N/A;  after Aug 13 for hx of diverticulitis    CRYOTHERAPY      RETINAL DETACHMENT SURGERY      RPK  OU Dr [facility]    2008    TONSILLECTOMY, ADENOIDECTOMY           ROS:  GENERAL: Reports hot flashes / sweats.  SKIN: Denies rash or lesions.   HEAD: Denies head injury or headache.   NODES: Denies enlarged lymph nodes.   CHEST: " Denies chest pain or shortness of breath.   CARDIOVASCULAR: Denies palpitations or left sided chest pain.   ABDOMEN: Reports recent pelvic cramping.  URINARY: No dysuria or hematuria.  REPRODUCTIVE: See HPI.   BREASTS: Denies pain, lumps, or nipple discharge.   HEMATOLOGIC: No easy bruisability or excessive bleeding.   MUSCULOSKELETAL: Denies joint pain or swelling.   NEUROLOGIC: Denies syncope or weakness.   PSYCHIATRIC: Denies depression.    PE:   (chaperone present during entire exam)  APPEARANCE: Well nourished, well developed, in no acute distress.  ABDOMEN: Soft. No tenderness or masses. No CVA tenderness.  VULVA: No lesions. Normal female genitalia.  URETHRAL MEATUS: Normal size and location, no lesions, no prolapse.  URETHRA: No masses, tenderness, prolapse or scarring.  VAGINA: Moist and well rugated, mild amount of dark blood in vault.  CERVIX: No lesions and discharge. No CMT.  UTERUS: Normal size, regular shape, mobile, non-tender, bladder base nontender.  ADNEXA: No masses, tenderness or CDS nodularity.  ANUS PERINEUM: Normal.    Diagnosis:  1. Pelvic pain    2. Perimenopausal symptoms          PLAN:    Orders Placed This Encounter    C. trachomatis/N. gonorrhoeae by AMP DNA [facility]; Cervix    Urine culture    US Pelvis Comp with Transvag NON-OB (xpd    Follicle Stimulating Hormone    LUTEINIZING HORMONE    TSH       Patient was counseled today on her pelvic cramping / dyspareunia and the various etiologies.  Today, she reports that cramping has resolved and, on exam, she is pain free.  UPT today was negative.  Urine and cervical cultures were performed to rule out an infectious cause.  She will have pelvic ultrasound for evaluation of her uterus and adnexa.  Hormone levels with be performed to check her menopausal status.  We also discussed her history of HARSHA 1 for which she will return for annual exam / pap.    Follow-up for annual exam / pap 11/17/23    I spent a total of 35 minutes on the day of the  visit.This includes face to face time and non-face to face time preparing to see the patient (eg, review of tests), Obtaining and/or reviewing separately obtained history, Documenting clinical information in the electronic or other health record, Independently interpreting resultsand communicating results to the patient/family/caregiver, or Care coordination.    This note was created with voice recognition software.  Grammatical, syntax and spelling errors may be inevitable.

## 2023-10-14 ENCOUNTER — HOSPITAL ENCOUNTER (OUTPATIENT)
Dept: RADIOLOGY | Facility: OTHER | Age: 43
Discharge: HOME | End: 2023-10-14
Attending: OBSTETRICS & GYNECOLOGY
Payer: COMMERCIAL

## 2023-10-14 DIAGNOSIS — R10.2 PELVIC PAIN: ICD-10-CM

## 2023-10-14 PROCEDURE — 76830 US PELVIS COMP WITH TRANSVAG NON-OB (XPD): ICD-10-PCS | Mod: 26,,, | Performed by: RADIOLOGY

## 2023-10-14 PROCEDURE — 76830 TRANSVAGINAL US NON-OB: CPT | Mod: 26,,, | Performed by: RADIOLOGY

## 2023-10-14 PROCEDURE — 76856 US EXAM PELVIC COMPLETE: CPT | Mod: 26,,, | Performed by: RADIOLOGY

## 2023-10-14 PROCEDURE — 76830 TRANSVAGINAL US NON-OB: CPT | Mod: TC

## 2023-10-14 PROCEDURE — 76856 US PELVIS COMP WITH TRANSVAG NON-OB (XPD): ICD-10-PCS | Mod: 26,,, | Performed by: RADIOLOGY

## 2023-10-16 ENCOUNTER — TELEPHONE (OUTPATIENT)
Dept: OBSTETRICS AND GYNECOLOGY | Facility: CLINIC | Age: 43
End: 2023-10-16
Payer: COMMERCIAL

## 2023-10-16 NOTE — TELEPHONE ENCOUNTER
Called patient:    Discussed results of pelvic sono:       FINDINGS:  Uterus:  Size: 8.5 x 5.8 x 4.2 cm  Masses: Multiple fibroids ranging in size from 1.7-5.0 cm.  Endometrium: Normal in this pre menopausal patient, measuring 8 mm.  Right ovary:  Size: The 3.0 cm  Appearance: Normal  Vascular flow: Normal.  Left ovary:  Size: 2.2 cm  Appearance: Normal  Vascular Flow: Normal.  Free Fluid:  None.  Impression:  Fibroid uterus    No adnexal pathology noted.  Discussed asymptomatic fibroids at length and management options -   She is not experiencing any pelvic pain, pressure, or significant bleeding.  Desires conservative management for now.

## 2023-10-31 ENCOUNTER — TELEPHONE (OUTPATIENT)
Dept: OBSTETRICS AND GYNECOLOGY | Facility: CLINIC | Age: 43
End: 2023-10-31
Payer: COMMERCIAL

## 2023-10-31 RX ORDER — NORETHINDRONE ACETATE AND ETHINYL ESTRADIOL .02; 1 MG/1; MG/1
1 TABLET ORAL DAILY
Qty: 63 TABLET | Refills: 0 | Status: SHIPPED | OUTPATIENT
Start: 2023-10-31 | End: 2023-11-17 | Stop reason: SDUPTHER

## 2023-10-31 RX ORDER — NORETHINDRONE ACETATE AND ETHINYL ESTRADIOL .02; 1 MG/1; MG/1
1 TABLET ORAL
Qty: 21 TABLET | Refills: 38 | OUTPATIENT
Start: 2023-10-31

## 2023-10-31 NOTE — TELEPHONE ENCOUNTER
Interface, Surescripts In  P Cristina COLLADO Staff  Caller: Unspecified (Today, 12:16 AM)         Requested Renewals     Name from pharmacy: NORETHIND-ETH ESTRAD 1-0.02 MG         Will file in chart as: norethindrone-ethinyl estradiol (MICROGESTIN 1/20) 1-20 mg-mcg per tablet    Sig: TAKE 1 TABLET BY MOUTH EVERY DAY    Disp:  21 tablet    Refills:  38    Start: 10/31/2023    Class: Normal    Last ordered: 1 year ago (10/19/2022) by Thom Evans MD    Last refill: 10/3/2023    Rx #: [id number]    OB/GYN:  Contraceptives Failed 10/31/2023 12:16 AM   Protocol Details  Matches previous order    Valid OB/GYN Encounter within 15 months    Patient is at least 18 years old    No positive pregnancy tests documented in last 360 days    Patient is not an active smoker    Negative Pregnancy Status Check    No contraindicated diagnoses on problem list    BP completed in the last 450 days      To be filled at: [facility]/pharmacy #[id number] - [place], [place] - [address]

## 2023-11-13 ENCOUNTER — HOSPITAL ENCOUNTER (OUTPATIENT)
Dept: RADIOLOGY | Facility: OTHER | Age: 43
Discharge: HOME | End: 2023-11-13
Payer: COMMERCIAL

## 2023-11-13 DIAGNOSIS — Z12.31 ENCOUNTER FOR SCREENING MAMMOGRAM FOR BREAST CANCER: ICD-10-CM

## 2023-11-13 PROCEDURE — 77067 MAMMO DIGITAL SCREENING BILAT WITH TOMO: ICD-10-PCS | Mod: 26,,, | Performed by: RADIOLOGY

## 2023-11-13 PROCEDURE — 77067 SCR MAMMO BI INCL CAD: CPT | Mod: 26,,, | Performed by: RADIOLOGY

## 2023-11-13 PROCEDURE — 77063 BREAST TOMOSYNTHESIS BI: CPT | Mod: 26,,, | Performed by: RADIOLOGY

## 2023-11-13 PROCEDURE — 77067 SCR MAMMO BI INCL CAD: CPT | Mod: TC

## 2023-11-13 PROCEDURE — 77063 MAMMO DIGITAL SCREENING BILAT WITH TOMO: ICD-10-PCS | Mod: 26,,, | Performed by: RADIOLOGY

## 2023-11-17 ENCOUNTER — OFFICE VISIT (OUTPATIENT)
Dept: OBSTETRICS AND GYNECOLOGY | Facility: CLINIC | Age: 43
End: 2023-11-17
Attending: OBSTETRICS & GYNECOLOGY
Payer: COMMERCIAL

## 2023-11-17 VITALS
BODY MASS INDEX: 20.2 KG/M2 | SYSTOLIC BLOOD PRESSURE: 118 MMHG | HEIGHT: 65 IN | WEIGHT: 121.25 LBS | DIASTOLIC BLOOD PRESSURE: 72 MMHG

## 2023-11-17 DIAGNOSIS — Z12.31 VISIT FOR SCREENING MAMMOGRAM: ICD-10-CM

## 2023-11-17 DIAGNOSIS — Z30.41 ENCOUNTER FOR SURVEILLANCE OF CONTRACEPTIVE PILLS: ICD-10-CM

## 2023-11-17 DIAGNOSIS — D21.9 FIBROIDS: ICD-10-CM

## 2023-11-17 DIAGNOSIS — Z12.4 PAP SMEAR FOR CERVICAL CANCER SCREENING: ICD-10-CM

## 2023-11-17 DIAGNOSIS — Z01.419 WOMEN'S ANNUAL ROUTINE GYNECOLOGICAL EXAMINATION: Primary | ICD-10-CM

## 2023-11-17 PROCEDURE — 99999 PR PBB SHADOW E&M-EST. PATIENT-LVL IV: CPT | Mod: PBBFAC,,, | Performed by: OBSTETRICS & GYNECOLOGY

## 2023-11-17 PROCEDURE — 3074F PR MOST RECENT SYSTOLIC BLOOD PRESSURE < 130 MM HG: ICD-10-PCS | Mod: CPTII,S$GLB,, | Performed by: OBSTETRICS & GYNECOLOGY

## 2023-11-17 PROCEDURE — 3078F PR MOST RECENT DIASTOLIC BLOOD PRESSURE < 80 MM HG: ICD-10-PCS | Mod: CPTII,S$GLB,, | Performed by: OBSTETRICS & GYNECOLOGY

## 2023-11-17 PROCEDURE — 3008F PR BODY MASS INDEX (BMI) DOCUMENTED: ICD-10-PCS | Mod: CPTII,S$GLB,, | Performed by: OBSTETRICS & GYNECOLOGY

## 2023-11-17 PROCEDURE — 3074F SYST BP LT 130 MM HG: CPT | Mod: CPTII,S$GLB,, | Performed by: OBSTETRICS & GYNECOLOGY

## 2023-11-17 PROCEDURE — 99396 PREV VISIT EST AGE 40-64: CPT | Mod: S$GLB,,, | Performed by: OBSTETRICS & GYNECOLOGY

## 2023-11-17 PROCEDURE — 1159F MED LIST DOCD IN RCRD: CPT | Mod: CPTII,S$GLB,, | Performed by: OBSTETRICS & GYNECOLOGY

## 2023-11-17 PROCEDURE — 87624 HPV HI-RISK TYP POOLED RSLT: CPT | Performed by: OBSTETRICS & GYNECOLOGY

## 2023-11-17 PROCEDURE — 1160F RVW MEDS BY RX/DR IN RCRD: CPT | Mod: CPTII,S$GLB,, | Performed by: OBSTETRICS & GYNECOLOGY

## 2023-11-17 PROCEDURE — 3078F DIAST BP <80 MM HG: CPT | Mod: CPTII,S$GLB,, | Performed by: OBSTETRICS & GYNECOLOGY

## 2023-11-17 PROCEDURE — 1159F PR MEDICATION LIST DOCUMENTED IN MEDICAL RECORD: ICD-10-PCS | Mod: CPTII,S$GLB,, | Performed by: OBSTETRICS & GYNECOLOGY

## 2023-11-17 PROCEDURE — 88175 CYTOPATH C/V AUTO FLUID REDO: CPT | Performed by: OBSTETRICS & GYNECOLOGY

## 2023-11-17 PROCEDURE — 99999 PR PBB SHADOW E&M-EST. PATIENT-LVL IV: ICD-10-PCS | Mod: PBBFAC,,, | Performed by: OBSTETRICS & GYNECOLOGY

## 2023-11-17 PROCEDURE — 1160F PR REVIEW ALL MEDS BY PRESCRIBER/CLIN PHARMACIST DOCUMENTED: ICD-10-PCS | Mod: CPTII,S$GLB,, | Performed by: OBSTETRICS & GYNECOLOGY

## 2023-11-17 PROCEDURE — 3008F BODY MASS INDEX DOCD: CPT | Mod: CPTII,S$GLB,, | Performed by: OBSTETRICS & GYNECOLOGY

## 2023-11-17 PROCEDURE — 99396 PR PREVENTIVE VISIT,EST,40-64: ICD-10-PCS | Mod: S$GLB,,, | Performed by: OBSTETRICS & GYNECOLOGY

## 2023-11-17 RX ORDER — NORETHINDRONE ACETATE AND ETHINYL ESTRADIOL .02; 1 MG/1; MG/1
1 TABLET ORAL DAILY
Qty: 63 TABLET | Refills: 3 | Status: SHIPPED | OUTPATIENT
Start: 2023-11-17

## 2023-11-17 NOTE — PROGRESS NOTES
"Georgie Wetzel is a 43 y.o. female  who presents for annual exam.   She continues on Microgestin  OCPs and has essentially no bleeding except for last month. She had been seen 10/13/23 for evaluation of pelvic discomfort as well as an episode of unexpected withdrawal bleeding.  Since then, her pelvic discomfort has essentially resolved and she has not had any additional bleeding.  She has a history of HARSHA 1 on colposcopy 2022, followed conservatively.  Denies recent changes in her medical / surgical history.  Recent mammogram with results pending.  No GYN complaints.   Patient's last menstrual period was 2023.    Blood pressure 118/72, height 5' 5" (1.651 m), weight 55 kg (121 lb 4.1 oz), last menstrual period 2023.    23 Mammogram: results pending    Summary:  3/10/20 Pap: Negative, HPV: Negative  21 Pap: Negative, other HR HPV present  10/19/22 Pap: Negative, other HR HPV present  22 Colpo: Ecto HARSHA 1, Ecc Negative    10/14/23 Pelvic sono:  FINDINGS:  Uterus:  Size: 8.5 x 5.8 x 4.2 cm  Masses: Multiple fibroids ranging in size from 1.7-5.0 cm.  Endometrium: Normal in this pre menopausal patient, measuring 8 mm.  Right ovary:  Size: The 3.0 cm  Appearance: Normal  Vascular flow: Normal.  Left ovary:  Size: 2.2 cm  Appearance: Normal  Vascular Flow: Normal.  Free Fluid:  None.  Impression:  Fibroid uterus      Past Medical History:   Diagnosis Date    Abnormal Pap smear of cervix     Acne     Depression     treated with medication last use , situational     GERD (gastroesophageal reflux disease)     High cholesterol     Retinal detachment        Past Surgical History:   Procedure Laterality Date     Retinal Tear OS        COLONOSCOPY N/A 2017    Procedure: COLONOSCOPY;  Surgeon: Vahid Lynn MD;  Location: [facility] ([address] [place] [address]);  Service: Endoscopy;  Laterality: N/A;  after Aug 13 for hx of diverticulitis    CRYOTHERAPY      RETINAL DETACHMENT SURGERY      RPK  " [facility]    2008    TONSILLECTOMY, ADENOIDECTOMY         OB History          0    Para   0    Term   0       0    AB   0    Living   0         SAB   0    IAB   0    Ectopic   0    Multiple   0    Live Births                     ROS:  GENERAL: Feeling well overall.   SKIN: Denies rash or lesions.   HEAD: Denies head injury or headache.   NODES: Denies enlarged lymph nodes.   CHEST: Denies chest pain or shortness of breath.   CARDIOVASCULAR: Denies palpitations or left sided chest pain.   ABDOMEN: No abdominal pain, nausea, vomiting or rectal bleeding.   URINARY: No dysuria or hematuria.  REPRODUCTIVE: See HPI.   BREASTS: Denies pain, lumps, or nipple discharge.   HEMATOLOGIC: No easy bruisability or excessive bleeding.   MUSCULOSKELETAL: Denies joint pain or swelling.   NEUROLOGIC: Denies syncope or weakness.   PSYCHIATRIC: Denies depression.    PE:   (chaperone present during entire exam)  APPEARANCE: Well nourished, well developed, in no acute distress.  BREASTS: Symmetrical, no skin changes or visible lesions. No palpable masses, nipple discharge or adenopathy bilaterally.  ABDOMEN: Soft. No tenderness or masses. No CVA tenderness.  VULVA: No lesions. Normal female genitalia.  URETHRAL MEATUS: Normal size and location, no lesions, no prolapse.  URETHRA: No masses, tenderness, prolapse or scarring.  VAGINA: Moist and well rugated, no abnormal discharge, no significant cystocele or rectocele.  CERVIX: No lesions and discharge. Stenotic.  PAP done.  UTERUS: Normal size, irregular posterior contour consistent with fibroids, non-tender, bladder base nontender.  ADNEXA: No masses, tenderness or CDS nodularity.  ANUS PERINEUM: Normal.      Diagnosis:  1. Women's annual routine gynecological examination    2. Encounter for surveillance of contraceptive pills    3. Fibroids    4. Pap smear for cervical cancer screening    5. Visit for screening mammogram          PLAN:    Orders Placed This Encounter     HPV High Risk Genotypes, PCR    Mammo Digital Screening Bilat w/ [place]    Liquid-Based Pap Smear, Screening    norethindrone-ethinyl estradiol (MICROGESTIN 1/20) 1-20 mg-mcg per tablet       Patient was counseled today on the need for annual gyn exams.  She continues to do well on Microgestin 1/20 and desires to continue.  We discussed her prior episode of pelvic pain and bleeding which has now resolved.  We also discussed fibroids for which she is asymptomatic as well as her pap / colpo history.    Follow-up in 1 year.

## 2023-11-21 LAB
HPV HR 12 DNA SPEC QL NAA+PROBE: POSITIVE
HPV16 AG SPEC QL: NEGATIVE
HPV18 DNA SPEC QL NAA+PROBE: NEGATIVE

## 2023-11-29 ENCOUNTER — TELEPHONE (OUTPATIENT)
Dept: OBSTETRICS AND GYNECOLOGY | Facility: CLINIC | Age: 43
End: 2023-11-29
Payer: COMMERCIAL

## 2023-11-29 LAB
FINAL PATHOLOGIC DIAGNOSIS: NORMAL
Lab: NORMAL

## 2023-11-29 NOTE — TELEPHONE ENCOUNTER
Called patient:    Discussed results of pap:  Cytology negative, other HR HPV present.    History of HARSHA 1 on colpo last year.    REC: Colpo - will schedule.

## 2023-12-29 ENCOUNTER — TELEPHONE (OUTPATIENT)
Dept: OBSTETRICS AND GYNECOLOGY | Facility: CLINIC | Age: 43
End: 2023-12-29
Payer: COMMERCIAL

## 2023-12-29 NOTE — TELEPHONE ENCOUNTER
----- Message from Arlen Rice MA sent at 12/29/2023  2:26 PM CST -----    ----- Message -----  From: Arlen Rice MA  Sent: 12/1/2023  10:13 AM CST  To: Arlen Rice MA    Patient Calls  (Newest Message First)  View All Conversations on this Encounter  Thom Evans MD Sargent William T Staff2 days ago      Please help her schedule colpo.  She is aware.  Thanks.    Thom Evans MD2 days ago      Called patient:     Discussed results of pap:  Cytology negative, other HR HPV present.     History of HARSHA 1 on colpo last year.     REC: Colpo - will schedule.

## 2024-01-16 ENCOUNTER — TELEPHONE (OUTPATIENT)
Dept: OBSTETRICS AND GYNECOLOGY | Facility: CLINIC | Age: 44
End: 2024-01-16
Payer: COMMERCIAL

## 2024-01-16 NOTE — TELEPHONE ENCOUNTER
----- Message from Christin Robbins sent at 1/16/2024  3:13 PM CST -----  Name of Who is Calling:BLAKE WALTON [[id number]]                What is the request in detail: Pt calling because she received a call to schedule a lab and she is asking for a call back to schedule.Please call back to further assist.                 Can the clinic reply by [facility]: No                What Number to [facility] Back [facility]: [phone]

## 2024-01-18 ENCOUNTER — TELEPHONE (OUTPATIENT)
Dept: OBSTETRICS AND GYNECOLOGY | Facility: CLINIC | Age: 44
End: 2024-01-18
Payer: COMMERCIAL

## 2024-01-18 NOTE — TELEPHONE ENCOUNTER
----- Message from Arlen Rice MA sent at 12/1/2023 10:13 AM CST -----  Patient Calls  (Newest Message First)  View All Conversations on this Encounter  Thom Evans MD Sargent William T Staff2 days ago      Please help her schedule colpo.  She is aware.  Thanks.    Thom Evans MD2 days ago      Called patient:     Discussed results of pap:  Cytology negative, other HR HPV present.     History of HARSHA 1 on colpo last year.     REC: Colpo - will schedule.

## 2024-01-25 ENCOUNTER — PROCEDURE VISIT (OUTPATIENT)
Dept: OBSTETRICS AND GYNECOLOGY | Facility: CLINIC | Age: 44
End: 2024-01-25
Attending: OBSTETRICS & GYNECOLOGY
Payer: COMMERCIAL

## 2024-01-25 VITALS
WEIGHT: 121.06 LBS | HEIGHT: 65 IN | DIASTOLIC BLOOD PRESSURE: 68 MMHG | SYSTOLIC BLOOD PRESSURE: 112 MMHG | BODY MASS INDEX: 20.17 KG/M2

## 2024-01-25 DIAGNOSIS — R87.810 PAP SMEAR OF CERVIX SHOWS HIGH RISK HPV PRESENT: Primary | ICD-10-CM

## 2024-01-25 LAB
B-HCG UR QL: NEGATIVE
CTP QC/QA: YES

## 2024-01-25 PROCEDURE — 81025 URINE PREGNANCY TEST: CPT | Mod: S$GLB,,, | Performed by: OBSTETRICS & GYNECOLOGY

## 2024-01-25 PROCEDURE — 88305 TISSUE EXAM BY PATHOLOGIST: CPT | Mod: 26,,, | Performed by: PATHOLOGY

## 2024-01-25 PROCEDURE — 57454 BX/CURETT OF CERVIX W/SCOPE: CPT | Mod: S$GLB,,, | Performed by: OBSTETRICS & GYNECOLOGY

## 2024-01-25 PROCEDURE — 88305 TISSUE EXAM BY PATHOLOGIST: CPT | Mod: 59 | Performed by: PATHOLOGY

## 2024-01-25 NOTE — PROCEDURES
"COLPOSCOPY:    Georgie Wetzel is a 43 y.o. female who presents for a colposcopy secondary to persistent HPV on her pap.  Her most recent pap 11/17/23 returned with normal cytology but other HR HPV was present.  She has a history of paps with persistent other HPV and mild cervical dysplasia on colposcopy 11/17/22.    Blood pressure 112/68, height 5' 5" (1.651 m), weight 54.9 kg (121 lb 0.5 oz).      UPT today is negative.    Summary:  3/10/20 Pap: Negative, HPV: Negative  6/22/21 Pap: Negative, other HR HPV present  10/19/22 Pap: Negative, other HR HPV present  11/17/22 Colpo: Ecto HARSHA 1, Ecc Negative  11/17/23 Pap: Negative, other HR HPV present        PRE-COLPOSCOPY PROCEDURE COUNSELING:  Discussed the abnormal pap test findings, HPV, need for colposcopy and possible biopsies to determine a diagnosis and plan of care, treatments available, the minimal risks of bleeding and infection with a colposcopy, alternatives to colposcopy and she agrees to proceed.  Patient was given the opportunity to ask questions and written consent was obtained.        COLPOSCOPY EXAM:   TIME OUT PERFORMED.   Cervix visualized with speculum  Cervix stenotic- gently dilated with blue os finder.  Acetic acid applied to cervix  Entire transformation zone seen  White epithelium noted at 11:00 to 12:00  Biopsy was taken at 11:00  ECC performed.    Hemostatic with silver nitrate.  Minimal blood loss.      The speculum was removed.   The patient tolerated the procedure well.    There were no complications.      IMPRESSION:   Pap with normal cytology, other HR HPV present    POST COLPOSCOPY COUNSELING:   Manage post colposcopy cramping with NSAIDs or Tylenol.  Avoid anything in vagina (intercourse, douching, tampons) one week after the procedure.  Report bleeding heavier than a period, worsening pain, fever > 101.0 F, or foul-smelling vaginal discharge.  Importance of follow-up stressed.      FOLLOW-UP:   We will contact her with biopsy " results and to discuss management plan.

## 2024-01-29 LAB
FINAL PATHOLOGIC DIAGNOSIS: NORMAL
GROSS: NORMAL
Lab: NORMAL

## 2024-01-31 ENCOUNTER — TELEPHONE (OUTPATIENT)
Dept: OBSTETRICS AND GYNECOLOGY | Facility: CLINIC | Age: 44
End: 2024-01-31
Payer: COMMERCIAL

## 2024-02-01 NOTE — TELEPHONE ENCOUNTER
Called patient:    Discussed results of colpo:    1. Endocervical curettage fragments of mucoid material, insufficient for diagnosis.   2. Cervical biopsy at 11 o'clock shows focal mild squamous dysplasia and koilocytotic atypia (CIN1).     REC:  Repeat pap in one year  - I emphasized the importance of follow-up.

## 2024-03-15 ENCOUNTER — OFFICE VISIT (OUTPATIENT)
Dept: OPHTHALMOLOGY | Facility: CLINIC | Age: 44
End: 2024-03-15
Payer: COMMERCIAL

## 2024-03-15 DIAGNOSIS — H31.003 CHORIORETINAL SCAR, BOTH EYES: Primary | ICD-10-CM

## 2024-03-15 PROCEDURE — 92014 COMPRE OPH EXAM EST PT 1/>: CPT | Mod: S$GLB,,, | Performed by: OPHTHALMOLOGY

## 2024-03-15 PROCEDURE — 1160F RVW MEDS BY RX/DR IN RCRD: CPT | Mod: CPTII,S$GLB,, | Performed by: OPHTHALMOLOGY

## 2024-03-15 PROCEDURE — 92201 OPSCPY EXTND RTA DRAW UNI/BI: CPT | Mod: S$GLB,,, | Performed by: OPHTHALMOLOGY

## 2024-03-15 PROCEDURE — 1159F MED LIST DOCD IN RCRD: CPT | Mod: CPTII,S$GLB,, | Performed by: OPHTHALMOLOGY

## 2024-03-15 PROCEDURE — 99999 PR PBB SHADOW E&M-EST. PATIENT-LVL III: CPT | Mod: PBBFAC,,, | Performed by: OPHTHALMOLOGY

## 2024-03-15 NOTE — PROGRESS NOTES
Subjective:       Patient ID: Georgie Wetzel is a 43 y.o. female      Chief Complaint   Patient presents with    Follow-up     Overdue for routine f/u of high myopia, retinal tear     History of Present Illness  HPI     Follow-up     Additional comments: Overdue for routine f/u of high myopia, retinal tear           Comments    Overdue Oct/Dfe   Dls: 03/11/2021 Dr. Quesada     Pt states her vision is stable, no questions or concerns     Doing well with vision.  Not needing any glasses/CLs   -Flashes   -Floaters   -Pain   No gtts           Last edited by Wilton Quesada MD on 3/15/2024 12:21 PM.        Imaging:    OCT myopic tilt with otherwise normal findings.  Documentation only.    Assessment/Plan:     1. Chorioretinal scar of both eyes  Stable OU  H/o high myopia pre PRK  H/o retinal tear and retinopexy OS 2005    RD precautions discussed in detail, patient expressed understanding  RTC immediately PRN (especially ANY change flashes, floaters, vision, visual field)      Follow up in about 1 year (around 3/15/2025), or if symptoms worsen or fail to improve, for Comprehensive Examination.

## 2024-11-14 ENCOUNTER — HOSPITAL ENCOUNTER (OUTPATIENT)
Dept: RADIOLOGY | Facility: OTHER | Age: 44
Discharge: HOME | End: 2024-11-14
Attending: OBSTETRICS & GYNECOLOGY
Payer: COMMERCIAL

## 2024-11-14 DIAGNOSIS — Z12.31 VISIT FOR SCREENING MAMMOGRAM: ICD-10-CM

## 2024-11-14 PROCEDURE — 77067 SCR MAMMO BI INCL CAD: CPT | Mod: TC

## 2024-11-19 ENCOUNTER — OFFICE VISIT (OUTPATIENT)
Dept: OBSTETRICS AND GYNECOLOGY | Facility: CLINIC | Age: 44
End: 2024-11-19
Attending: OBSTETRICS & GYNECOLOGY
Payer: COMMERCIAL

## 2024-11-19 VITALS
WEIGHT: 122.13 LBS | HEART RATE: 67 BPM | BODY MASS INDEX: 20.35 KG/M2 | DIASTOLIC BLOOD PRESSURE: 84 MMHG | HEIGHT: 65 IN | SYSTOLIC BLOOD PRESSURE: 122 MMHG

## 2024-11-19 DIAGNOSIS — Z12.4 PAP SMEAR FOR CERVICAL CANCER SCREENING: ICD-10-CM

## 2024-11-19 DIAGNOSIS — Z01.419 WOMEN'S ANNUAL ROUTINE GYNECOLOGICAL EXAMINATION: Primary | ICD-10-CM

## 2024-11-19 DIAGNOSIS — Z30.41 ENCOUNTER FOR SURVEILLANCE OF CONTRACEPTIVE PILLS: ICD-10-CM

## 2024-11-19 DIAGNOSIS — N95.1 PERIMENOPAUSAL SYMPTOMS: ICD-10-CM

## 2024-11-19 DIAGNOSIS — G43.109 OCULAR MIGRAINE: ICD-10-CM

## 2024-11-19 PROCEDURE — 99999 PR PBB SHADOW E&M-EST. PATIENT-LVL IV: CPT | Mod: PBBFAC,,, | Performed by: OBSTETRICS & GYNECOLOGY

## 2024-11-19 PROCEDURE — 88175 CYTOPATH C/V AUTO FLUID REDO: CPT | Performed by: OBSTETRICS & GYNECOLOGY

## 2024-11-19 PROCEDURE — 87624 HPV HI-RISK TYP POOLED RSLT: CPT | Performed by: OBSTETRICS & GYNECOLOGY

## 2024-11-19 RX ORDER — DROSPIRENONE 4 MG/1
4 TABLET, FILM COATED ORAL DAILY
Qty: 84 TABLET | Refills: 3 | Status: SHIPPED | OUTPATIENT
Start: 2024-11-19

## 2024-11-19 NOTE — PROGRESS NOTES
"Georgie Wetzel is 44 y.o. female  who presents today for annual exam.  She is currently on Microgestin  OCPs.  She generally does not experience any withdrawal bleeding with this pill.  Recently, she has noted light periods, night sweats, mood swings, and episodes of breast tenderness.  Also, she had an ocular migraine yesterday, lasting for 5 hours - she has not had a similar event in many years.  She has a history of HARSHA 1 on colposcopy 2024, followed conservatively.  She has asymptomatic uterine fibroids, followed conservatively.  Recent mammogram is pending at this time.  Patient's last menstrual period was 2024.    Blood pressure 122/84, pulse 67, height 5' 5" (1.651 m), weight 55.4 kg (122 lb 2.2 oz), last menstrual period 2024.    Summary:  3/10/20 Pap: Negative, HPV: Negative  21 Pap: Negative, other HR HPV present  10/19/22 Pap: Negative, other HR HPV present  22 Colpo: Ecto HARSHA 1, Ecc Negative  23 Pap: Negative, other HR HPV present  24 Colpo: Ecton HARSHA 1, Ecc Negative    23 Mammogram: Negative, TC: 11.57%  24 Mammogram: results pending    10/14/23 Pelvic sono:  FINDINGS:  Uterus:  Size: 8.5 x 5.8 x 4.2 cm  Masses: Multiple fibroids ranging in size from 1.7-5.0 cm.  Endometrium: Normal in this pre menopausal patient, measuring 8 mm.  Right ovary:  Size: The 3.0 cm  Appearance: Normal  Vascular flow: Normal.  Left ovary:  Size: 2.2 cm  Appearance: Normal  Vascular Flow: Normal.  Free Fluid:  None.  Impression:  Fibroid uterus    Past Medical History:   Diagnosis Date    Abnormal Pap smear of cervix     Acne     Depression     treated with medication last use , situational     GERD (gastroesophageal reflux disease)     High cholesterol     Retinal detachment        Past Surgical History:   Procedure Laterality Date     Retinal Tear OS        COLONOSCOPY N/A 2017    Procedure: COLONOSCOPY;  Surgeon: Vahid Lynn MD;  Location: [facility]" (4TH FLR);  Service: Endoscopy;  Laterality: N/A;  after Aug 13 for hx of diverticulitis    CRYOTHERAPY      RETINAL DETACHMENT SURGERY      RPK  OU Dr [facility]    7/2008    TONSILLECTOMY, ADENOIDECTOMY           ROS:  GENERAL: Feeling well overall.   SKIN: Denies rash or lesions.   HEAD:  Reports recent ocular migraine.  NODES: Denies enlarged lymph nodes.   CHEST: Denies chest pain or shortness of breath.   CARDIOVASCULAR: Denies palpitations or left sided chest pain.   ABDOMEN: No abdominal pain, nausea, vomiting or rectal bleeding.   URINARY: No dysuria or hematuria.  REPRODUCTIVE: See HPI.   BREASTS: Denies pain, lumps, or nipple discharge.   HEMATOLOGIC: No easy bruisability or excessive bleeding.   MUSCULOSKELETAL: Denies joint pain or swelling.   NEUROLOGIC: Denies syncope or weakness.   PSYCHIATRIC: Denies depression.    PE:   (chaperone present during entire exam)  APPEARANCE: Well nourished, well developed, in no acute distress.  BREASTS: Symmetrical, no skin changes or visible lesions. No palpable masses, nipple discharge or adenopathy bilaterally.  ABDOMEN: Soft. No tenderness or masses.   VULVA: No lesions. Normal female genitalia.  URETHRAL MEATUS: Normal size and location, no lesions, no prolapse.  URETHRA: No masses, tenderness, prolapse or scarring.  VAGINA: Moist and well rugated, no abnormal discharge.  CERVIX: No lesions and discharge.  Stenotic.  PAP done.  UTERUS: Normal size, irregular shape consistent with fibroids, non-tender, bladder base nontender.  ADNEXA: No masses, tenderness or CDS nodularity.  ANUS PERINEUM: Normal.      Diagnosis:  1. Women's annual routine gynecological examination    2. Perimenopausal symptoms    3. Encounter for surveillance of contraceptive pills    4. Pap smear for cervical cancer screening    5. Ocular migraine          PLAN:    Orders Placed This Encounter    HPV High Risk Genotypes, PCR    Luteinizing Hormone    Follicle Stimulating Hormone    TSH     Liquid-Based Pap Smear, Screening    drospirenone, contraceptive, (SLYND) 4 mg (28) Tab         Patient was counseled today on perimenopausal issues and her usage of OCPs.  With ocular migraines, we discussed the recommendation to convert from a combination OCP to a progesterone only pill.  We reviewed Slynd: r / b / correct usage.  She will have hormone levels drawn at the end of her placebo week.  She plans to see her neurologist soon and will let us know her progress.    Follow-up in 1 year.    This note was created with voice recognition software.  Grammatical, syntax and spelling errors may be inevitable.

## 2024-11-21 ENCOUNTER — PATIENT MESSAGE (OUTPATIENT)
Dept: OBSTETRICS AND GYNECOLOGY | Facility: CLINIC | Age: 44
End: 2024-11-21
Payer: COMMERCIAL

## 2024-11-22 ENCOUNTER — TELEPHONE (OUTPATIENT)
Dept: CRITICAL CARE MEDICINE | Facility: HOSPITAL | Age: 44
End: 2024-11-22
Payer: COMMERCIAL

## 2024-11-22 DIAGNOSIS — G43.109 OCULAR MIGRAINE: Primary | ICD-10-CM

## 2024-11-22 RX ORDER — RIZATRIPTAN BENZOATE 10 MG/1
10 TABLET, ORALLY DISINTEGRATING ORAL
Qty: 9 TABLET | Refills: 0 | Status: SHIPPED | OUTPATIENT
Start: 2024-11-22 | End: 2024-12-22

## 2024-11-23 ENCOUNTER — PATIENT MESSAGE (OUTPATIENT)
Dept: OBSTETRICS AND GYNECOLOGY | Facility: CLINIC | Age: 44
End: 2024-11-23
Payer: COMMERCIAL

## 2024-11-23 LAB
FINAL PATHOLOGIC DIAGNOSIS: NORMAL
Lab: NORMAL

## 2024-11-28 RX ORDER — NORETHINDRONE ACETATE AND ETHINYL ESTRADIOL .02; 1 MG/1; MG/1
1 TABLET ORAL
Qty: 63 TABLET | Refills: 3 | OUTPATIENT
Start: 2024-11-28

## 2024-11-28 NOTE — TELEPHONE ENCOUNTER
Refill Decision Note   Georgie Wetzel  is requesting a refill authorization.  Brief Assessment and Rationale for Refill:  Quick Discontinue     Medication Therapy Plan:  discontinued on 11/19/2024 by Thom Evans,    Medication Reconciliation Completed: No   Comments:     No Care Gaps recommended.     Note composed:9:42 AM 11/28/2024

## 2024-11-29 ENCOUNTER — TELEPHONE (OUTPATIENT)
Dept: OBSTETRICS AND GYNECOLOGY | Facility: CLINIC | Age: 44
End: 2024-11-29
Payer: COMMERCIAL

## 2024-11-29 ENCOUNTER — LAB VISIT (OUTPATIENT)
Dept: LAB | Facility: OTHER | Age: 44
End: 2024-11-29
Attending: OBSTETRICS & GYNECOLOGY
Payer: COMMERCIAL

## 2024-11-29 DIAGNOSIS — N95.1 PERIMENOPAUSAL SYMPTOMS: ICD-10-CM

## 2024-11-29 LAB
FSH SERPL-ACNC: 7.52 MIU/ML
LH SERPL-ACNC: 4.6 MIU/ML
TSH SERPL DL<=0.005 MIU/L-ACNC: 0.61 UIU/ML (ref 0.4–4)

## 2024-11-29 PROCEDURE — 36415 COLL VENOUS BLD VENIPUNCTURE: CPT | Performed by: OBSTETRICS & GYNECOLOGY

## 2024-11-29 PROCEDURE — 83001 ASSAY OF GONADOTROPIN (FSH): CPT | Performed by: OBSTETRICS & GYNECOLOGY

## 2024-11-29 PROCEDURE — 84443 ASSAY THYROID STIM HORMONE: CPT | Performed by: OBSTETRICS & GYNECOLOGY

## 2024-11-29 PROCEDURE — 83002 ASSAY OF GONADOTROPIN (LH): CPT | Performed by: OBSTETRICS & GYNECOLOGY

## 2024-11-29 NOTE — TELEPHONE ENCOUNTER
Patient would like to return to the Microgestin that she was previously taking instead of the Slynd

## 2024-11-29 NOTE — TELEPHONE ENCOUNTER
November 29, 2024  Georgie Wetzel to KAYLA COLLADO Staff (supporting You)         11/29/24  3:03 PM  Nope, I wont need it until 12/8  Me to Georgie Wetzel   DI      11/29/24  1:12 PM  Dr Evans is out until Monday, I will send it to him but I am not sure when he will look at his messages. Do you need it this weekend?    Last read by Georgie Wetzel at  3:02 PM on 11/29/2024.  Georgie Wetzel to KAYLA COLLADO Staff (supporting You)         11/29/24 10:46 AM  Thats it. Thanks so much   Me to Georgie Wetzel   DI      11/29/24  9:18 AM  To confirm:    Disp Refills Start End MASHA   norethindrone-ethinyl estradiol (MICROGESTIN 1/20) 1-20 mg-mcg per tablet               Last read by Georgie Wetzel at  3:02 PM on 11/29/2024.    Georgie Wetzel to KAYLA COLLADO Staff (supporting You)         11/29/24  9:09 AM  Arlen-     Is it also possible to get my birth control refilled at [facility] on [address]?  And to go with what I had been on instead of the SLNT?     Thanks!  Georgie

## 2024-11-30 RX ORDER — NORETHINDRONE ACETATE AND ETHINYL ESTRADIOL .02; 1 MG/1; MG/1
1 TABLET ORAL DAILY
Qty: 63 TABLET | Refills: 3 | Status: SHIPPED | OUTPATIENT
Start: 2024-11-30

## 2024-12-05 ENCOUNTER — TELEPHONE (OUTPATIENT)
Dept: OBSTETRICS AND GYNECOLOGY | Facility: CLINIC | Age: 44
End: 2024-12-05
Payer: COMMERCIAL

## 2024-12-05 NOTE — TELEPHONE ENCOUNTER
Called patient:    Discussed results of labs:  FSH 7.5, LH 4.6, TSH .615    Reviewed pap: Cytology negative, other HR HPV present  History of persistent other HR HPV with HARSHA 1 on colpo.    REC: Colpo - will schedule    She is scheduled to follow-up with her neurologist 1/2/25.

## 2024-12-13 ENCOUNTER — TELEPHONE (OUTPATIENT)
Dept: OBSTETRICS AND GYNECOLOGY | Facility: CLINIC | Age: 44
End: 2024-12-13
Payer: COMMERCIAL

## 2024-12-13 NOTE — TELEPHONE ENCOUNTER
I will share your message. Arlen  ===View-only below this line===      ----- Message -----       From:Georgie Wetzel       Sent:12/13/2024  7:01 AM CST         To:User Message Message List    Subject:RESULTS    Arlen-    Can you please have Dr. Evans give me a call?  I just have a quick question about my results.    Thank you,  eGorgie      ----- Message -----       From:Jack Mckinley       Sent:11/29/2024  3:42 PM CST         To:Georgie Wetzel    Subject:RESULTS    Thank you      ----- Message -----       From:Georgie Wetzel       Sent:11/29/2024  3:23 PM CST         To:User Message Message List    Subject:RESULTS

## 2024-12-17 ENCOUNTER — TELEPHONE (OUTPATIENT)
Dept: OBSTETRICS AND GYNECOLOGY | Facility: CLINIC | Age: 44
End: 2024-12-17
Payer: COMMERCIAL

## 2024-12-18 ENCOUNTER — TELEPHONE (OUTPATIENT)
Dept: OBSTETRICS AND GYNECOLOGY | Facility: CLINIC | Age: 44
End: 2024-12-18
Payer: COMMERCIAL

## 2024-12-18 RX ORDER — DROSPIRENONE 4 MG/1
4 TABLET, FILM COATED ORAL DAILY
Qty: 84 TABLET | Refills: 3 | Status: SHIPPED | OUTPATIENT
Start: 2024-12-18

## 2024-12-18 NOTE — TELEPHONE ENCOUNTER
----- Message from [employer] sent at 12/18/2024  8:32 AM CST -----  Patient is calling, Yesterday Dr Evans called her SLYND and [facility] told her it was like 600 dollars with the [facility] it 200 but she thought Dr Evans called this medication in to [facility] pharmacy and it was like 25.00. can you please look into this and contact patient to discuss. [phone]. Patient has also scheduled her coplo.

## 2024-12-18 NOTE — TELEPHONE ENCOUNTER
----- Message from Elizabeth sent at 2024  8:25 AM CST -----  [phone]  Type:  Patient Returning Call    Who Called:  self     Who Left Message for Patient: Keila     Does the patient know what this is regarding?: schedule procedure     Would the patient rather a call back or a response via [facility]? Call back     Best Call Back Number: [phone]

## 2024-12-18 NOTE — TELEPHONE ENCOUNTER
----- Message from Flayr sent at 12/18/2024  8:32 AM CST -----  Patient is calling, Yesterday Dr Evans called her SLYND and [facility] told her it was like 600 dollars with the [facility] it 200 but she thought Dr Evans called this medication in to [facility] pharmacy and it was like 25.00. can you please look into this and contact patient to discuss. [phone]. Patient has also scheduled her coplo.

## 2025-01-09 ENCOUNTER — PROCEDURE VISIT (OUTPATIENT)
Dept: OBSTETRICS AND GYNECOLOGY | Facility: CLINIC | Age: 45
End: 2025-01-09
Attending: OBSTETRICS & GYNECOLOGY
Payer: COMMERCIAL

## 2025-01-09 VITALS
WEIGHT: 120.38 LBS | HEIGHT: 65 IN | DIASTOLIC BLOOD PRESSURE: 70 MMHG | BODY MASS INDEX: 20.06 KG/M2 | SYSTOLIC BLOOD PRESSURE: 120 MMHG

## 2025-01-09 DIAGNOSIS — R87.810 PAP SMEAR OF CERVIX SHOWS HIGH RISK HPV PRESENT: Primary | ICD-10-CM

## 2025-01-09 LAB
B-HCG UR QL: NEGATIVE
CTP QC/QA: YES

## 2025-01-09 RX ORDER — OMEPRAZOLE 20 MG/1
20 CAPSULE, DELAYED RELEASE ORAL
COMMUNITY

## 2025-01-09 RX ORDER — AZELASTINE 1 MG/ML
SPRAY, METERED NASAL
COMMUNITY

## 2025-01-09 RX ORDER — RIZATRIPTAN BENZOATE 10 MG/1
TABLET, ORALLY DISINTEGRATING ORAL
COMMUNITY
Start: 2024-11-22

## 2025-01-09 RX ORDER — PSYLLIUM HUSK 0.4 G
CAPSULE ORAL
COMMUNITY

## 2025-01-09 RX ORDER — MINOXIDIL 50 MG/G
AEROSOL, FOAM TOPICAL
COMMUNITY

## 2025-01-09 RX ORDER — LACTOBACILLUS COMBINATION NO.4 3B CELL
CAPSULE ORAL
COMMUNITY

## 2025-01-09 NOTE — PROCEDURES
COLPOSCOPY:    Georgie Wetzel is a 44 y.o. 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  Pap performed at her annual exam 11/19/24 returned with normal cytology but other HR HPV was present.  She has a history of persistent other HR HPV, followed with colposcopy.  Last year, colpo directed biopsy revealed HARSHA 1.  She has a history of ocular migraines and has now converted from combination OCPs to Slynd.    UPT today is negative.    Summary:  3/10/20 Pap: Negative, HPV: Negative  6/22/21 Pap: Negative, other HR HPV present  10/19/22 Pap: Negative, other HR HPV present  11/17/22 Colpo: Ecto HARSHA 1, Ecc Negative  11/17/23 Pap: Negative, other HR HPV present  1/25/24 Colpo: Ecton HARSHA 1, Ecc Negative  11/19/24 Pap: Negative, other HR HPV present        PRE-COLPOSCOPY PROCEDURE COUNSELING:  Discussed the abnormal pap test findings, HPV, need for colposcopy and possible biopsies to determine a diagnosis and plan of care, treatments available, the minimal risks of bleeding and infection with a colposcopy, alternatives to colposcopy and she agrees to proceed.  Patient was given the opportunity to ask questions and written consent was obtained.        COLPOSCOPY EXAM:   TIME OUT PERFORMED.   Cervix visualized with speculum  Acetic acid applied to cervix  Cervix stenotic, gently dilated with blue os finder.  Entire transformation zone seen  Mild amount of white epithelium noted at 11:00 -12:00.  Biopsy was taken at 11:00  ECC performed.    Hemostatic with silver nitrate.  Minimal blood loss.      The speculum was removed.   The patient tolerated the procedure well.    There were no complications.      IMPRESSION:   Pap with normal cytology, persistent other HR HPV  HARSHA 1 on colpo last year    POST COLPOSCOPY COUNSELING:   Manage post colposcopy cramping with NSAIDs or Tylenol.  Avoid anything in vagina (intercourse, douching, tampons) one week after the procedure.  Report bleeding  heavier than a period, worsening pain, fever > 101.0 F, or foul-smelling vaginal discharge.  Importance of follow-up stressed.      FOLLOW-UP:   To call us in several days for biopsy results and to discuss treatment plan.

## 2025-01-15 ENCOUNTER — TELEPHONE (OUTPATIENT)
Dept: OBSTETRICS AND GYNECOLOGY | Facility: CLINIC | Age: 45
End: 2025-01-15
Payer: COMMERCIAL

## 2025-01-16 NOTE — TELEPHONE ENCOUNTER
Called patient    Discussed results of colpo:    1. Cervical biopsy at 11 o'clock shows mild squamous dysplasia and koilocytotic atypia (CIN1).  The transition zone is not identified in this biopsy specimen.    2. Endocervical curettage shows scant fragments of benign endocervical tissue, no dysplasia identified.     REC:  Repeat pap in one year- I emphasized the importance of follow-up.

## 2025-04-08 ENCOUNTER — OFFICE VISIT (OUTPATIENT)
Dept: URGENT CARE | Facility: CLINIC | Age: 45
End: 2025-04-08
Payer: COMMERCIAL

## 2025-04-08 VITALS
TEMPERATURE: 98 F | DIASTOLIC BLOOD PRESSURE: 82 MMHG | HEART RATE: 64 BPM | OXYGEN SATURATION: 99 % | RESPIRATION RATE: 19 BRPM | BODY MASS INDEX: 19.83 KG/M2 | SYSTOLIC BLOOD PRESSURE: 135 MMHG | WEIGHT: 119 LBS | HEIGHT: 65 IN

## 2025-04-08 DIAGNOSIS — H61.23 BILATERAL IMPACTED CERUMEN: ICD-10-CM

## 2025-04-08 DIAGNOSIS — J02.9 VIRAL PHARYNGITIS: Primary | ICD-10-CM

## 2025-04-08 DIAGNOSIS — J02.9 SORE THROAT: ICD-10-CM

## 2025-04-08 LAB
CTP QC/QA: YES
MOLECULAR STREP A: NEGATIVE

## 2025-04-08 PROCEDURE — 87651 STREP A DNA AMP PROBE: CPT | Mod: QW,S$GLB,, | Performed by: NURSE PRACTITIONER

## 2025-04-08 PROCEDURE — 99214 OFFICE O/P EST MOD 30 MIN: CPT | Mod: S$GLB,,, | Performed by: NURSE PRACTITIONER

## 2025-04-08 RX ORDER — RIMEGEPANT SULFATE 75 MG/75MG
75 TABLET, ORALLY DISINTEGRATING ORAL DAILY PRN
COMMUNITY
Start: 2025-03-18 | End: 2026-03-18

## 2025-04-08 RX ORDER — FLUTICASONE PROPIONATE 50 MCG
SPRAY, SUSPENSION (ML) NASAL
COMMUNITY
Start: 2025-04-07

## 2025-04-08 RX ORDER — OFLOXACIN 3 MG/ML
5 SOLUTION AURICULAR (OTIC) DAILY
Qty: 5 ML | Refills: 0 | Status: SHIPPED | OUTPATIENT
Start: 2025-04-08 | End: 2025-04-13

## 2025-04-08 NOTE — PROGRESS NOTES
"Subjective:      Patient ID: Georgie Wetzel is a 44 y.o. female.    Vitals:  height is 5' 5" (1.651 m) and weight is 54 kg (119 lb). Her oral temperature is 98.1 °F (36.7 °C). Her blood pressure is 135/82 and her pulse is 64. Her respiration is 19 and oxygen saturation is 99%.     Chief Complaint: Sore Throat (Clogged ear, sore throat - Entered by patient)    Patient presents with left ear pain, and sore throat Onset 2 weeks.    Provider note begins below    Patient states she has sent the ENT recently.  She was told she had wax in her ears then.  She reports a sore throat.  Wants to make sure she is not contagious.  Denies any concern for flu or COVID.    Otalgia   There is pain in the left ear. This is a new problem. Episode onset: -2 weeks. The pain is at a severity of 6/10. The pain is moderate. Associated symptoms include hearing loss and a sore throat. Pertinent negatives include no coughing, diarrhea or vomiting. She has tried NSAIDs for the symptoms. The treatment provided no relief.       HENT:  Positive for ear pain, hearing loss and sore throat.    Cardiovascular:  Negative for chest pain and sob on exertion.   Respiratory:  Negative for cough.    Gastrointestinal:  Negative for nausea, vomiting, constipation and diarrhea.      Objective:     Physical Exam   Constitutional: She is oriented to person, place, and time.   HENT:   Head: Normocephalic and atraumatic.   Bilateral PE tubes clogged with wax      Comments: Bilateral PE tubes clogged with wax  Cardiovascular: Normal rate.   Pulmonary/Chest: Effort normal. No respiratory distress.   Abdominal: Normal appearance.   Neurological: She is alert and oriented to person, place, and time.   Skin: Skin is warm and dry.   Psychiatric: Her behavior is normal. Mood normal.       Results for orders placed or performed in visit on 04/08/25   POCT Strep A, Molecular    Collection Time: 04/08/25 12:07 PM   Result Value Ref Range    Molecular Strep A, POC " Negative Negative     Acceptable Yes         Assessment:     1. Viral pharyngitis    2. Sore throat    3. Bilateral impacted cerumen        Plan:   Keep ENT appointment Thursday  Attempt to soften wax with ear drops   Strep test negative        Viral pharyngitis    Sore throat  -     POCT Strep A, Molecular  -     (Magic mouthwash) 1:1:1 diphenhydrAMINE(Benadryl) 12.5mg/5ml liq, aluminum & magnesium hydroxide-simethicone (Maalox), LIDOcaine viscous 2%; Swish and spit 10 mLs every 4 (four) hours as needed (sore throat).  Dispense: 200 mL; Refill: 0    Bilateral impacted cerumen  -     ofloxacin (FLOXIN) 0.3 % otic solution; Place 5 drops into both ears once daily. for 5 days  Dispense: 5 mL; Refill: 0

## 2025-04-15 ENCOUNTER — CLINICAL SUPPORT (OUTPATIENT)
Dept: AUDIOLOGY | Facility: CLINIC | Age: 45
End: 2025-04-15
Payer: COMMERCIAL

## 2025-04-15 ENCOUNTER — OFFICE VISIT (OUTPATIENT)
Dept: OTOLARYNGOLOGY | Facility: CLINIC | Age: 45
End: 2025-04-15
Payer: COMMERCIAL

## 2025-04-15 DIAGNOSIS — H90.12 CONDUCTIVE HEARING LOSS OF LEFT EAR WITH UNRESTRICTED HEARING OF RIGHT EAR: Primary | ICD-10-CM

## 2025-04-15 DIAGNOSIS — Z96.22 BILATERAL PATENT PRESSURE EQUALIZATION (PE) TUBES: ICD-10-CM

## 2025-04-15 DIAGNOSIS — H69.92 DYSFUNCTION OF LEFT EUSTACHIAN TUBE: Primary | ICD-10-CM

## 2025-04-15 PROCEDURE — 92567 TYMPANOMETRY: CPT | Mod: S$GLB,,, | Performed by: AUDIOLOGIST

## 2025-04-15 PROCEDURE — 99203 OFFICE O/P NEW LOW 30 MIN: CPT | Mod: S$GLB,,, | Performed by: OTOLARYNGOLOGY

## 2025-04-15 PROCEDURE — 92557 COMPREHENSIVE HEARING TEST: CPT | Mod: S$GLB,,, | Performed by: AUDIOLOGIST

## 2025-04-15 PROCEDURE — 99999 PR PBB SHADOW E&M-EST. PATIENT-LVL I: CPT | Mod: PBBFAC,,,

## 2025-04-15 NOTE — LETTER
April 16, 2025        Rhianna Eaton MD  [address] [place] [address] [address]  [address]  [place] [address]             [facility] [address]  [address]  [place] [address]  Phone: [phone]  Fax: [phone]   Patient: Georgie Wetzel   MR Number: [id number]   YOB: 1980   Date of Visit: 4/15/2025       Dear Dr. Eaton:    Thank you for referring Georgie Wetzel to me for evaluation. Attached you will find relevant portions of my assessment and plan of care.    If you have questions, please do not hesitate to call me. I look forward to following Georgie Wetzel along with you.    Sincerely,      Master, MD Paul            CC  No Recipients    Enclosure

## 2025-04-15 NOTE — PROGRESS NOTES
Ms. Georgie Wetzel was seen in the clinic today for an audiological evaluation.  Georgie reported muffled hearing and aural fullness of the left ear for approximately 3 weeks.  She also reported PE tubes in both ears.    Audiological testing revealed normal hearing sensitivity for the right ear and a moderate low frequency hearing loss rising to normal hearing, with a conductive component at 500 Hz and 1000 Hz for the left ear.  A speech reception threshold was obtained at 10 dBHL for the right ear and at 10 dBHL for the left ear.  Speech discrimination was 100% for the right ear and 100% for the left ear.      Tympanometry testing revealed could not seal for the right ear and a Type B (small ECV) tympanogram for the left ear.      Recommendations:  1. Otologic evaluation  2. Audiological evaluation as needed  3. Hearing protection when in noise

## 2025-04-16 NOTE — PROGRESS NOTES
Subjective     Patient ID: Georgie Wetzel is a 44 y.o. female.    Chief Complaint:  hearing loss    HPI     Georgie Wetzel is a 44 y.o. female with hx of PE tubes placed 2 years ago. Reports recent onset of left sided ear fullness and hearing loss. Denies vertigo, otorrhea or otalgia.      Review of Systems   Constitutional:  Negative for chills and fever.   HENT:  Negative for sore throat and trouble swallowing.    Respiratory:  Negative for apnea and chest tightness.    Cardiovascular:  Negative for chest pain.          Objective     Physical Exam  Vitals and nursing note reviewed.   Constitutional:       Appearance: Normal appearance.   HENT:      Head: Normocephalic and atraumatic.      Right Ear: Tympanic membrane, ear canal and external ear normal. There is no impacted cerumen. A PE tube is present.      Left Ear: Tympanic membrane, ear canal and external ear normal. There is no impacted cerumen. A PE tube is present.      Ears:      Garland exam findings: Lateralizes left.  Neurological:      Mental Status: She is alert.       Data Reviewed:         Audiogram tracings independently reviewed and discussed with patient shows bilateral hearing loss at 250hz.  AS > AD.  Tuning fork confirms CHL with left garland    Assessment and Plan     1. Conductive hearing loss of left ear with unrestricted hearing of right ear    2. Bilateral patent pressure equalization (PE) tubes        Tubes removed with some immediate improvement in symptoms  F/u in 1 mo to check for healing with audiogram.           No follow-ups on file.

## 2025-05-20 ENCOUNTER — OFFICE VISIT (OUTPATIENT)
Dept: OTOLARYNGOLOGY | Facility: CLINIC | Age: 45
End: 2025-05-20
Payer: COMMERCIAL

## 2025-05-20 ENCOUNTER — CLINICAL SUPPORT (OUTPATIENT)
Dept: AUDIOLOGY | Facility: CLINIC | Age: 45
End: 2025-05-20
Payer: COMMERCIAL

## 2025-05-20 DIAGNOSIS — Z01.10 NORMAL HEARING TEST: Primary | ICD-10-CM

## 2025-05-20 DIAGNOSIS — H93.293 ABNORMAL AUDITORY PERCEPTION OF BOTH EARS: Primary | ICD-10-CM

## 2025-05-20 PROCEDURE — 1159F MED LIST DOCD IN RCRD: CPT | Mod: CPTII,S$GLB,, | Performed by: OTOLARYNGOLOGY

## 2025-05-20 PROCEDURE — 99999 PR PBB SHADOW E&M-EST. PATIENT-LVL III: CPT | Mod: PBBFAC,,, | Performed by: OTOLARYNGOLOGY

## 2025-05-20 PROCEDURE — 99213 OFFICE O/P EST LOW 20 MIN: CPT | Mod: S$GLB,,, | Performed by: OTOLARYNGOLOGY

## 2025-05-20 PROCEDURE — 92567 TYMPANOMETRY: CPT | Mod: S$GLB,,, | Performed by: AUDIOLOGIST

## 2025-05-20 PROCEDURE — 99999 PR PBB SHADOW E&M-EST. PATIENT-LVL I: CPT | Mod: PBBFAC,,, | Performed by: AUDIOLOGIST

## 2025-05-20 PROCEDURE — 92557 COMPREHENSIVE HEARING TEST: CPT | Mod: S$GLB,,, | Performed by: AUDIOLOGIST

## 2025-05-20 NOTE — PROGRESS NOTES
Georgie Wetzel, a 44 y.o. female, was seen today in the clinic for an audiologic evaluation.  Patient as a history of PE tubes placed about two years ago. PE tubes were taken out about a month ago. Patient reported improved hearing sensitivity and diminished tinnitus. Tinnitus was described as low level static only perceived in quiet. Patient denied vertigo.    Tympanometry revealed Type A in the right ear and Type A in the left ear.     Audiogram results revealed normal hearing sensitivity in the right ear and essentially normal hearing with slight air bone gap in the lower frequencies in the left ear.  Speech reception thresholds were noted at 10 dB in the right ear and 15 dB in the left ear.  Speech discrimination scores were 100% in the right ear and 100% in the left ear.    Recommendations:  Otologic evaluation  Repeat audiogram as needed  Hearing protection when in noise

## 2025-05-20 NOTE — PROGRESS NOTES
Subjective:   Georgie Wetzel is a 44 y.o. female who presents today for follow up. She has a hx of bilateral PET placed about 2 years ago. Recently presented to Dr. Avery for new onset fullness and hearing loss in the left ear. He removed both tubes one month ago. She has noticed a significant improvement in hearing.     Past Medical History  She has a past medical history of Abnormal Pap smear of cervix, Acne, Depression, GERD (gastroesophageal reflux disease), High cholesterol, and Retinal detachment.    Past Surgical History  She has a past surgical history that includes TONSILLECTOMY, ADENOIDECTOMY;  Retinal Tear OS  2005; RPK  OU Dr Munoz   (7/2008); Retinal detachment surgery; Cryotherapy; and Colonoscopy (N/A, 8/25/2017).    Family History  Her family history includes Acne in her brother; Atrial fibrillation in her father; Cataracts in her maternal grandfather; Diabetes in her maternal grandmother; Glaucoma in her maternal grandfather; Hyperlipidemia in her father; Ovarian cancer (age of onset: 48) in her mother; Scleroderma in her paternal grandmother; Stroke in her paternal grandmother.    Social History  She reports that she has never smoked. She has never used smokeless tobacco. She reports current alcohol use. She reports that she does not use drugs.    Allergies  She is allergic to no known drug allergies.    Medications  She has a current medication list which includes the following prescription(s): azelastine, bimatoprost, co-enzyme q-10, slynd, fluticasone propionate, lactobacillus acidophilus, probiotic, lorazepam, magnesium, minoxidil, minoxidil, naproxen, nurtec, omeprazole, ondansetron, psyllium husk, riboflavin (vitamin b2), rizatriptan, rizatriptan, spironolactone, tizanidine, trazodone, and ziana.    Review of Systems   HENT: Negative for ear discharge, ear infection, ear pain and hearing loss.          Objective:       Ears:    Right Ear: No drainage, swelling or tenderness. Tympanic  membrane is not perforated. No middle ear effusion.   Left Ear: No drainage, swelling or tenderness. Tympanic membrane is not perforated.  No middle ear effusion.   Ceruminous debris obstructing bilateral TM removed via microscopy. Mild scabbing on bilateral TM where tubes were. Appears to be healing well. No evidence of perforation.     Procedure  Cerumen removal performed.  See procedure note.  Procedure Note:  The patient was brought to the minor procedure room and placed under the operating microscope of the right and left ear canal which was cleaned of ceruminous debris. Using a combination of suction, curettes and cup forceps the patient's cerumen was removed. The patient tolerated the procedure well. There were no complications.     Audiology       I independently reviewed the tracings of the complete audiometric evaluation performed today. I reviewed the audiogram with the patient as well. Pertinent findings include: essentially normal hearing test with a slight air bone gap at 1k Hz in the left ear. SRT 10 dBHL AD and 15 dBHL AS. Speech discrimination 100% AU. Type A tympanogram AU.       Assessment:     1. Normal hearing test      Plan:   Georgie was seen today for follow-up.  Diagnoses and all orders for this visit:    Normal hearing test  Patient presents today for follow up after tube removal. Tms appear to be healing well. No evidence of perforation. Essentially normal audiogram today. Reassurance provided.     Follow up as needed.